# Patient Record
Sex: FEMALE | Race: WHITE | NOT HISPANIC OR LATINO | Employment: OTHER | URBAN - METROPOLITAN AREA
[De-identification: names, ages, dates, MRNs, and addresses within clinical notes are randomized per-mention and may not be internally consistent; named-entity substitution may affect disease eponyms.]

---

## 2017-02-01 ENCOUNTER — OFFICE VISIT (OUTPATIENT)
Dept: ORTHOPEDIC SURGERY | Facility: CLINIC | Age: 57
End: 2017-02-01

## 2017-02-01 VITALS — BODY MASS INDEX: 28.28 KG/M2 | WEIGHT: 176 LBS | HEIGHT: 66 IN

## 2017-02-01 DIAGNOSIS — S93.421A TEAR OF DELTOID LIGAMENT OF ANKLE, RIGHT, INITIAL ENCOUNTER: ICD-10-CM

## 2017-02-01 DIAGNOSIS — Z96.652 STATUS POST TOTAL KNEE REPLACEMENT, LEFT: Primary | ICD-10-CM

## 2017-02-01 PROCEDURE — 99213 OFFICE O/P EST LOW 20 MIN: CPT | Performed by: ORTHOPAEDIC SURGERY

## 2017-02-01 RX ORDER — BUSPIRONE HYDROCHLORIDE 30 MG/1
TABLET ORAL
Refills: 0 | Status: ON HOLD | COMMUNITY
Start: 2017-01-11 | End: 2017-11-18

## 2017-02-01 RX ORDER — GABAPENTIN 800 MG/1
TABLET ORAL
Refills: 3 | Status: ON HOLD | COMMUNITY
Start: 2017-01-25 | End: 2017-11-18

## 2017-02-01 RX ORDER — HYDROCODONE BITARTRATE AND ACETAMINOPHEN 5; 325 MG/1; MG/1
1 TABLET ORAL EVERY 8 HOURS PRN
Qty: 30 TABLET | Refills: 0 | Status: ON HOLD | OUTPATIENT
Start: 2017-02-01 | End: 2017-11-18
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
## 2017-02-05 PROBLEM — Z96.652 STATUS POST TOTAL KNEE REPLACEMENT, LEFT: Status: ACTIVE | Noted: 2017-02-05

## 2017-02-05 PROBLEM — S93.429A TEAR OF DELTOID LIGAMENT OF ANKLE: Status: ACTIVE | Noted: 2017-02-05

## 2017-02-26 ENCOUNTER — HOSPITAL ENCOUNTER (INPATIENT)
Dept: HOSPITAL 23 - CED | Age: 57
LOS: 3 days | Discharge: HOME | DRG: 133 | End: 2017-03-01
Admitting: INTERNAL MEDICINE
Payer: MEDICARE

## 2017-02-26 DIAGNOSIS — N39.41: ICD-10-CM

## 2017-02-26 DIAGNOSIS — K13.0: Primary | ICD-10-CM

## 2017-02-26 DIAGNOSIS — Z88.1: ICD-10-CM

## 2017-02-26 DIAGNOSIS — L02.01: ICD-10-CM

## 2017-02-26 DIAGNOSIS — G89.4: ICD-10-CM

## 2017-02-26 DIAGNOSIS — I10: ICD-10-CM

## 2017-02-26 DIAGNOSIS — M19.90: ICD-10-CM

## 2017-02-26 DIAGNOSIS — F17.210: ICD-10-CM

## 2017-02-26 DIAGNOSIS — F41.9: ICD-10-CM

## 2017-02-26 LAB
BASOPHIL#: 0.1 X10E3 (ref 0–0.3)
BASOPHIL%: 0.6 % (ref 0–2.5)
CK MB SERPL-RTO: 12.5 % (ref 11–15.5)
CK MB SERPL-RTO: 33.9 G/DL (ref 30–36)
DIFF IND: NO
EOSINOPHIL#: 0.3 X10E3 (ref 0–0.7)
EOSINOPHIL%: 2.3 % (ref 0–7)
HEMATOCRIT: 36.3 % (ref 35–45)
HEMOGLOBIN: 12.3 GM/DL (ref 12–16)
LYMPHOCYTE#: 2.4 X10E3 (ref 1–3.5)
LYMPHOCYTE%: 19.8 % (ref 17–45)
MEAN CELL VOLUME: 92.1 FL (ref 83–96)
MEAN CORPUSCULAR HEMOGLOBIN: 31.2 PG (ref 28–34)
MEAN PLATELET VOLUME: 8.7 FL (ref 6.5–11.5)
MONOCYTE#: 0.9 X10E3 (ref 0–1)
MONOCYTE%: 7.1 % (ref 3–12)
NEUTROPHIL#: 8.6 X10E3 (ref 1.5–7.1)
NEUTROPHIL%: 70.2 % (ref 40–75)
PLATELET COUNT: 380 X10E3 (ref 140–420)
RED BLOOD COUNT: 3.94 X10E (ref 3.9–5.3)
WHITE BLOOD COUNT: 12.3 X10E3 (ref 4–10.5)

## 2017-02-27 LAB
BARBITURATES: (no result)
BENZODIAZEPINES: (no result)
BLOOD UREA NITROGEN: 7 MG/DL (ref 9–23)
BUN/CREATININE RATIO: 14
CALCIUM SERUM: 8.8 MG/DL (ref 8.4–10.2)
COCAINE: (no result)
CREATININE SERUM: 0.5 MG/DL (ref 0.6–1.4)
DX ICD CODE: (no result)
DX ICD CODE: (no result)
GLOM FILT RATE ESTIMATED: (no result) ML/MIN (ref 60–?)
GLUCOSE FASTING: 92 MG/DL (ref 70–110)
KETONES UR QL: 100 MMOL/L (ref 100–111)
KETONES UR QL: 28 MMOL/L (ref 22–31)
OPIATES: (no result)
POTASSIUM: 3.5 MMOL/L (ref 3.5–5.1)
SODIUM: 136 MMOL/L (ref 135–145)
TRICYCLIC ANTIDEPRESSANTS: (no result)
U METHADONE: (no result)

## 2017-02-27 PROCEDURE — 0JB10ZZ EXCISION OF FACE SUBCUTANEOUS TISSUE AND FASCIA, OPEN APPROACH: ICD-10-PCS | Performed by: SURGERY

## 2017-02-28 LAB
BLOOD UREA NITROGEN: 6 MG/DL (ref 9–23)
BUN/CREATININE RATIO: 10
CALCIUM SERUM: 9.4 MG/DL (ref 8.4–10.2)
CK MB SERPL-RTO: 12.6 % (ref 11–15.5)
CK MB SERPL-RTO: 33.5 G/DL (ref 30–36)
CREATININE SERUM: 0.6 MG/DL (ref 0.6–1.4)
GLOM FILT RATE ESTIMATED: (no result) ML/MIN (ref 60–?)
GLUCOSE FASTING: 114 MG/DL (ref 70–110)
HEMATOCRIT: 38.1 % (ref 35–45)
HEMOGLOBIN: 12.8 GM/DL (ref 12–16)
KETONES UR QL: 31 MMOL/L (ref 22–31)
KETONES UR QL: 98 MMOL/L (ref 100–111)
MEAN CELL VOLUME: 92.8 FL (ref 83–96)
MEAN CORPUSCULAR HEMOGLOBIN: 31.1 PG (ref 28–34)
MEAN PLATELET VOLUME: 8.4 FL (ref 6.5–11.5)
PLATELET COUNT: 392 X10E3 (ref 140–420)
POTASSIUM: 3.4 MMOL/L (ref 3.5–5.1)
RED BLOOD COUNT: 4.11 X10E (ref 3.9–5.3)
SODIUM: 141 MMOL/L (ref 135–145)
WHITE BLOOD COUNT: 9.1 X10E3 (ref 4–10.5)

## 2017-03-01 LAB
BLOOD UREA NITROGEN: 9 MG/DL (ref 9–23)
BUN/CREATININE RATIO: 12.85
CALCIUM SERUM: 9.2 MG/DL (ref 8.4–10.2)
CREATININE SERUM: 0.7 MG/DL (ref 0.6–1.4)
GLOM FILT RATE ESTIMATED: (no result) ML/MIN (ref 60–?)
GLUCOSE FASTING: 95 MG/DL (ref 70–110)
KETONES UR QL: 34 MMOL/L (ref 22–31)
KETONES UR QL: 96 MMOL/L (ref 100–111)
MAGNESIUM: 1.9 MG/DL (ref 1.6–3)
POTASSIUM: 4.6 MMOL/L (ref 3.5–5.1)
SODIUM: 136 MMOL/L (ref 135–145)

## 2017-03-12 ENCOUNTER — HOSPITAL ENCOUNTER (EMERGENCY)
Dept: HOSPITAL 23 - CFTX | Age: 57
Discharge: HOME | End: 2017-03-12
Payer: MEDICARE
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
## 2017-03-12 DIAGNOSIS — Z79.899: ICD-10-CM
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
## 2017-03-12 DIAGNOSIS — S80.02XA: ICD-10-CM

## 2017-03-12 DIAGNOSIS — Z88.1: ICD-10-CM

## 2017-03-12 DIAGNOSIS — S93.401A: Primary | ICD-10-CM

## 2017-03-12 DIAGNOSIS — S80.01XA: ICD-10-CM

## 2017-03-12 DIAGNOSIS — F17.200: ICD-10-CM

## 2017-03-15 ENCOUNTER — TELEPHONE (OUTPATIENT)
Dept: ORTHOPEDIC SURGERY | Facility: CLINIC | Age: 57
End: 2017-03-15

## 2017-03-15 NOTE — TELEPHONE ENCOUNTER
Please make an appointment for her to see her PCP.  He needs to get x-rays.  If she needs an MRI they can order for her.  She can then see me if she has evidence of injury to the meniscus or the ACL or the MCL on the MRI in about 2 weeks.

## 2017-03-17 ENCOUNTER — HOSPITAL ENCOUNTER (EMERGENCY)
Dept: HOSPITAL 23 - CED | Age: 57
Discharge: HOME | End: 2017-03-17
Payer: MEDICARE

## 2017-03-17 DIAGNOSIS — F17.210: ICD-10-CM

## 2017-03-17 DIAGNOSIS — F41.1: ICD-10-CM

## 2017-03-17 DIAGNOSIS — Z88.1: ICD-10-CM

## 2017-03-17 DIAGNOSIS — R51: Primary | ICD-10-CM

## 2017-03-17 DIAGNOSIS — I10: ICD-10-CM

## 2017-03-17 DIAGNOSIS — J44.9: ICD-10-CM

## 2017-03-28 ENCOUNTER — TELEPHONE (OUTPATIENT)
Dept: ORTHOPEDIC SURGERY | Facility: CLINIC | Age: 57
End: 2017-03-28

## 2017-03-29 ENCOUNTER — TELEPHONE (OUTPATIENT)
Dept: ORTHOPEDIC SURGERY | Facility: CLINIC | Age: 57
End: 2017-03-29

## 2017-03-29 DIAGNOSIS — G89.29 CHRONIC PAIN OF BOTH KNEES: Primary | ICD-10-CM

## 2017-03-29 DIAGNOSIS — M25.561 CHRONIC PAIN OF BOTH KNEES: Primary | ICD-10-CM

## 2017-03-29 DIAGNOSIS — Z96.652 STATUS POST TOTAL KNEE REPLACEMENT, LEFT: ICD-10-CM

## 2017-03-29 DIAGNOSIS — M25.562 CHRONIC PAIN OF BOTH KNEES: Primary | ICD-10-CM

## 2017-03-29 DIAGNOSIS — M17.11 PRIMARY OSTEOARTHRITIS OF RIGHT KNEE: ICD-10-CM

## 2017-03-30 ENCOUNTER — TELEPHONE (OUTPATIENT)
Dept: ORTHOPEDIC SURGERY | Facility: CLINIC | Age: 57
End: 2017-03-30

## 2017-03-30 DIAGNOSIS — Z96.652 STATUS POST TOTAL KNEE REPLACEMENT, LEFT: ICD-10-CM

## 2017-03-30 DIAGNOSIS — S93.421A TEAR OF DELTOID LIGAMENT OF ANKLE, RIGHT, INITIAL ENCOUNTER: ICD-10-CM

## 2017-03-30 DIAGNOSIS — M25.561 CHRONIC PAIN OF BOTH KNEES: ICD-10-CM

## 2017-03-30 DIAGNOSIS — M25.562 CHRONIC PAIN OF BOTH KNEES: ICD-10-CM

## 2017-03-30 DIAGNOSIS — G89.29 CHRONIC PAIN OF BOTH KNEES: ICD-10-CM

## 2017-03-30 DIAGNOSIS — M17.11 PRIMARY OSTEOARTHRITIS OF RIGHT KNEE: ICD-10-CM

## 2017-03-30 DIAGNOSIS — G89.29 OTHER CHRONIC PAIN: Primary | ICD-10-CM

## 2017-03-30 NOTE — TELEPHONE ENCOUNTER
Please make a refferal to Pain management as requested and send in the office charts as requested  Sm

## 2017-04-06 ENCOUNTER — TELEPHONE (OUTPATIENT)
Dept: ORTHOPEDIC SURGERY | Facility: CLINIC | Age: 57
End: 2017-04-06

## 2017-04-07 ENCOUNTER — APPOINTMENT (OUTPATIENT)
Dept: PHYSICAL THERAPY | Facility: HOSPITAL | Age: 57
End: 2017-04-07
Attending: ORTHOPAEDIC SURGERY

## 2017-04-10 ENCOUNTER — HOSPITAL ENCOUNTER (EMERGENCY)
Dept: HOSPITAL 23 - CFTX | Age: 57
Discharge: HOME | End: 2017-04-10
Payer: MEDICARE

## 2017-04-10 ENCOUNTER — TELEPHONE (OUTPATIENT)
Dept: ORTHOPEDIC SURGERY | Facility: CLINIC | Age: 57
End: 2017-04-10

## 2017-04-10 DIAGNOSIS — F32.9: ICD-10-CM
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
## 2017-04-10 DIAGNOSIS — I10: ICD-10-CM

## 2017-04-10 DIAGNOSIS — I25.2: ICD-10-CM

## 2017-04-10 DIAGNOSIS — S89.92XA: Primary | ICD-10-CM

## 2017-04-10 DIAGNOSIS — F41.9: ICD-10-CM

## 2017-04-11 ENCOUNTER — TELEPHONE (OUTPATIENT)
Dept: ORTHOPEDIC SURGERY | Facility: CLINIC | Age: 57
End: 2017-04-11

## 2017-04-11 NOTE — TELEPHONE ENCOUNTER
SRIDHAR AGEE TALKED TO THIS PATIENT SEVERAL TIMES AND I'VE FAXED HER RECORDS TO THE PAIN CLINIC OF HER CHOICE. IM NOT SURE IF THEY WILL EVEN SEE HER BECAUSE SHE DOESN'T HAVE AN MRI FOR THE SPINE AND I L/M ADVISING HER OF THAT - message from Perri

## 2017-04-11 NOTE — TELEPHONE ENCOUNTER
Perri has already faxed her info to [facility]. Mya did you ask her which pain management location she is talking about?

## 2017-04-11 NOTE — TELEPHONE ENCOUNTER
Patient spoke to the pain clinic and they will see her next week. She is going to [address] [place] [address]. Their number is [phone].cld

## 2017-08-07 ENCOUNTER — HOSPITAL ENCOUNTER (INPATIENT)
Dept: HOSPITAL 23 - CED | Age: 57
LOS: 1 days | DRG: 918 | End: 2017-08-08
Attending: INTERNAL MEDICINE | Admitting: INTERNAL MEDICINE
Payer: MEDICARE

## 2017-08-07 VITALS — HEIGHT: 66 IN | BODY MASS INDEX: 26.01 KG/M2 | WEIGHT: 161.82 LBS

## 2017-08-07 DIAGNOSIS — K21.9: ICD-10-CM

## 2017-08-07 DIAGNOSIS — F41.9: ICD-10-CM

## 2017-08-07 DIAGNOSIS — N39.0: ICD-10-CM

## 2017-08-07 DIAGNOSIS — F11.20: ICD-10-CM

## 2017-08-07 DIAGNOSIS — F32.9: ICD-10-CM

## 2017-08-07 DIAGNOSIS — J44.9: ICD-10-CM

## 2017-08-07 DIAGNOSIS — T39.1X2A: Primary | ICD-10-CM

## 2017-08-07 DIAGNOSIS — T42.4X2A: ICD-10-CM

## 2017-08-07 DIAGNOSIS — T42.6X2A: ICD-10-CM

## 2017-08-07 DIAGNOSIS — Z88.1: ICD-10-CM

## 2017-08-07 DIAGNOSIS — F33.2: ICD-10-CM

## 2017-08-07 DIAGNOSIS — E78.5: ICD-10-CM

## 2017-08-07 LAB
ACETAMINOPHEN: <10 UG/ML
ALCOHOL BLOOD: <5 MG/DL
BARBITURATES UR QL SCN: 0.8 MG/DL (ref 0.2–2)
BARBITURATES UR QL SCN: 3.7 G/DL (ref 3.5–5)
BARBITURATES: (no result)
BASOPHIL#: 0 X10E3 (ref 0–0.3)
BASOPHIL%: 0.6 % (ref 0–2.5)
BENZODIAZ UR QL SCN: 33 U/L (ref 10–40)
BENZODIAZ UR QL SCN: 38 U/L (ref 10–42)
BENZODIAZEPINES: (no result)
BLOOD UREA NITROGEN: 12 MG/DL (ref 9–23)
BUN/CREATININE RATIO: 17.14
BZE UR QL SCN: 62 U/L (ref 32–92)
CALCIUM SERUM: 8.9 MG/DL (ref 8.4–10.2)
CK MB SERPL-RTO: 14 % (ref 11–15.5)
CK MB SERPL-RTO: 33.9 G/DL (ref 30–36)
COCAINE: (no result)
CREATININE SERUM: 0.7 MG/DL (ref 0.6–1.4)
DIFF IND: NO
DX ICD CODE: (no result)
DX ICD CODE: (no result)
EOSINOPHIL#: 0.1 X10E3 (ref 0–0.7)
EOSINOPHIL%: 1.1 % (ref 0–7)
ETHANOL BLD GC-MCNC: <4 MG/DL
GENTAMICIN PEAK SERPL-MCNC: YES MG/L
GLOM FILT RATE ESTIMATED: 96.9 ML/MIN (ref 60–?)
GLUCOSE FASTING: 103 MG/DL (ref 70–110)
HEMATOCRIT: 37.7 % (ref 35–45)
HEMOGLOBIN: 12.8 GM/DL (ref 12–16)
HIV1+2 AB SPEC QL IA.RAPID: 28.1 SECONDS (ref 23.5–31.3)
INR: 1
KETONES UR QL: 106 MMOL/L (ref 100–111)
KETONES UR QL: 27 MMOL/L (ref 22–31)
LYMPHOCYTE#: 1.1 X10E3 (ref 1–3.5)
LYMPHOCYTE%: 18.6 % (ref 17–45)
MEAN CELL VOLUME: 95 FL (ref 83–96)
MEAN CORPUSCULAR HEMOGLOBIN: 32.2 PG (ref 28–34)
MEAN PLATELET VOLUME: 8.6 FL (ref 6.5–11.5)
METHADONE UR QL SCN: 12 NG/ML (ref 0–7.9)
MONOCYTE#: 0.3 X10E3 (ref 0–1)
MONOCYTE%: 5.1 % (ref 3–12)
NEUTROPHIL#: 4.5 X10E3 (ref 1.5–7.1)
NEUTROPHIL%: 74.6 % (ref 40–75)
OPIATES: (no result)
PLATELET COUNT: 264 X10E3 (ref 140–420)
POC - TROPONIN: <0.05 NG/ML (ref ?–0.05)
POTASSIUM: 3.6 MMOL/L (ref 3.5–5.1)
PROTEIN TOTAL SERUM: 6.8 G/DL (ref 6–8.3)
PROTHROMBIN TIME (PATIENT): 10.6 SECONDS (ref 10–11.7)
RED BLOOD COUNT: 3.97 X10E (ref 3.9–5.3)
SODIUM: 141 MMOL/L (ref 135–145)
TRICYCLIC ANTIDEPRESSANTS: (no result)
U METHADONE: (no result)
URBCS1 AUWI: (no result) /[HPF] (ref 0–2)
URINE APPEARANCE: CLEAR
URINE BACTERIA AUWI: (no result)
URINE BILIRUBIN: (no result)
URINE BLOOD: (no result)
URINE COLOR: YELLOW
URINE GLUCOSE: (no result) MG/DL
URINE KETONE: (no result)
URINE LEUKOCYTE ESTERASE: (no result)
URINE NITRATE: (no result)
URINE PH: 6.5 (ref 5–8)
URINE PROTEIN: (no result)
URINE SOURCE: (no result)
URINE SPECIFIC GRAVITY: 1.01 (ref 1–1.03)
URINE SQUAMOUS EPITHELIAL CELL: (no result) /[HPF]
URINE UROBILINOGEN: 0.2 MG/DL
UWBCS1 AUWI: (no result) (ref 0–5)
WHITE BLOOD COUNT: 6 X10E3 (ref 4–10.5)

## 2017-08-07 PROCEDURE — G0480 DRUG TEST DEF 1-7 CLASSES: HCPCS

## 2017-08-08 ENCOUNTER — HOSPITAL ENCOUNTER (INPATIENT)
Dept: HOSPITAL 23 - P1E | Age: 57
LOS: 2 days | Discharge: HOME | DRG: 885 | End: 2017-08-10
Attending: PSYCHIATRY & NEUROLOGY | Admitting: PSYCHIATRY & NEUROLOGY
Payer: MEDICARE

## 2017-08-08 VITALS — WEIGHT: 160 LBS | HEIGHT: 66 IN | BODY MASS INDEX: 25.71 KG/M2

## 2017-08-08 DIAGNOSIS — E78.5: ICD-10-CM

## 2017-08-08 DIAGNOSIS — I10: ICD-10-CM

## 2017-08-08 DIAGNOSIS — F10.20: ICD-10-CM

## 2017-08-08 DIAGNOSIS — J44.9: ICD-10-CM

## 2017-08-08 DIAGNOSIS — M19.90: ICD-10-CM

## 2017-08-08 DIAGNOSIS — L02.01: ICD-10-CM

## 2017-08-08 DIAGNOSIS — G89.29: ICD-10-CM

## 2017-08-08 DIAGNOSIS — F33.2: Primary | ICD-10-CM

## 2017-08-08 DIAGNOSIS — N39.41: ICD-10-CM

## 2017-08-08 DIAGNOSIS — B95.62: ICD-10-CM

## 2017-08-08 LAB
%MB: 2.5 % (ref 0–4)
ARTERIAL BLOOD GAS ALLEN TEST: NORMAL
ARTERIAL BLOOD GAS ART SITE: (no result)
ARTERIAL BLOOD GAS DELIVERY: (no result)
ARTERIAL BLOOD GAS LITER FLOW: 2
BARBITURATES UR QL SCN: 0.7 MG/DL (ref 0.2–2)
BARBITURATES UR QL SCN: 3.5 G/DL (ref 3.5–5)
BASOPHIL#: 0.1 X10E3 (ref 0–0.3)
BASOPHIL%: 0.7 % (ref 0–2.5)
BENZODIAZ UR QL SCN: 33 U/L (ref 10–40)
BENZODIAZ UR QL SCN: 53 U/L (ref 10–42)
BLOOD UREA NITROGEN: 8 MG/DL (ref 9–23)
BUN/CREATININE RATIO: 11.42
BZE UR QL SCN: 59 U/L (ref 32–92)
CALCIUM SERUM: 9 MG/DL (ref 8.4–10.2)
CK MB SERPL-RTO: 14.5 % (ref 11–15.5)
CK MB SERPL-RTO: 32.9 G/DL (ref 30–36)
CK TOTAL: 1496 IU/L (ref 26–140)
CREATININE SERUM: 0.7 MG/DL (ref 0.6–1.4)
DIFF IND: NO
EOSINOPHIL#: 0.2 X10E3 (ref 0–0.7)
EOSINOPHIL%: 2.1 % (ref 0–7)
GENTAMICIN SERPL-MCNC: 0.8 %SAT (ref 0–2)
GENTAMICIN SERPL-MCNC: YES MG/L
GENTAMICIN TROUGH SERPL-MCNC: 1.1 %SAT (ref 0–9)
GENTAMICIN TROUGH SERPL-MCNC: 45 MMHG (ref 35–45)
GLOM FILT RATE ESTIMATED: 96.9 ML/MIN (ref 60–?)
GLUCOSE FASTING: 113 MG/DL (ref 70–110)
HCO3 BLDA-SCNC: 28.5 MMOL/L
HEMATOCRIT: 37.4 % (ref 35–45)
HEMOGLOBIN: 12.3 GM/DL (ref 12–16)
KETONES UR QL: 108 MMOL/L (ref 100–111)
KETONES UR QL: 27 MMOL/L (ref 22–31)
LYMPHOCYTE#: 1.7 X10E3 (ref 1–3.5)
LYMPHOCYTE%: 22.4 % (ref 17–45)
MB: 37 NG/ML
MEAN CELL VOLUME: 95.8 FL (ref 83–96)
MEAN CORPUSCULAR HEMOGLOBIN: 31.6 PG (ref 28–34)
MEAN PLATELET VOLUME: 8.4 FL (ref 6.5–11.5)
MONOCYTE#: 0.5 X10E3 (ref 0–1)
MONOCYTE%: 6.3 % (ref 3–12)
NEUTROPHIL#: 5.1 X10E3 (ref 1.5–7.1)
NEUTROPHIL%: 68.5 % (ref 40–75)
PLATELET COUNT: 252 X10E3 (ref 140–420)
PO2 BLDA: 115 MMHG (ref 80–100)
POTASSIUM: 4.2 MMOL/L (ref 3.5–5.1)
PROTEIN TOTAL SERUM: 6.5 G/DL (ref 6–8.3)
RED BLOOD COUNT: 3.91 X10E (ref 3.9–5.3)
SAO2 % BLDA: 97.1 % (ref 90–100)
SODIUM: 143 MMOL/L (ref 135–145)
WHITE BLOOD COUNT: 7.4 X10E3 (ref 4–10.5)

## 2017-08-09 LAB
BARBITURATES UR QL SCN: 0.3 MG/DL (ref 0.2–2)
BARBITURATES UR QL SCN: 3.1 G/DL (ref 3.5–5)
BARBITURATES: (no result)
BASOPHIL#: 0 X10E3 (ref 0–0.3)
BASOPHIL%: 0.9 % (ref 0–2.5)
BENZODIAZ UR QL SCN: 34 U/L (ref 10–40)
BENZODIAZ UR QL SCN: 55 U/L (ref 10–42)
BENZODIAZEPINES: (no result)
BLOOD UREA NITROGEN: 6 MG/DL (ref 9–23)
BUN/CREATININE RATIO: 8.57
BZE UR QL SCN: 54 U/L (ref 32–92)
CALCIUM SERUM: 9 MG/DL (ref 8.4–10.2)
CK MB SERPL-RTO: 14.1 % (ref 11–15.5)
CK MB SERPL-RTO: 33.3 G/DL (ref 30–36)
COCAINE: (no result)
CREATININE SERUM: 0.7 MG/DL (ref 0.6–1.4)
DIFF IND: NO
DX ICD CODE: (no result)
DX ICD CODE: (no result)
EOSINOPHIL#: 0.3 X10E3 (ref 0–0.7)
EOSINOPHIL%: 4.6 % (ref 0–7)
GLOM FILT RATE ESTIMATED: 96.9 ML/MIN (ref 60–?)
GLUCOSE FASTING: 70 MG/DL (ref 70–110)
HEMATOCRIT: 35.3 % (ref 35–45)
HEMOGLOBIN: 11.8 GM/DL (ref 12–16)
KETONES UR QL: 108 MMOL/L (ref 100–111)
KETONES UR QL: 27 MMOL/L (ref 22–31)
LYMPHOCYTE#: 2 X10E3 (ref 1–3.5)
LYMPHOCYTE%: 34.4 % (ref 17–45)
MEAN CELL VOLUME: 96.1 FL (ref 83–96)
MEAN CORPUSCULAR HEMOGLOBIN: 32 PG (ref 28–34)
MEAN PLATELET VOLUME: 9.3 FL (ref 6.5–11.5)
MONOCYTE#: 0.5 X10E3 (ref 0–1)
MONOCYTE%: 8.1 % (ref 3–12)
NEUTROPHIL#: 3 X10E3 (ref 1.5–7.1)
NEUTROPHIL%: 52 % (ref 40–75)
OPIATES: (no result)
PLATELET COUNT: 226 X10E3 (ref 140–420)
POTASSIUM: 3.8 MMOL/L (ref 3.5–5.1)
PROTEIN TOTAL SERUM: 5.8 G/DL (ref 6–8.3)
RED BLOOD COUNT: 3.67 X10E (ref 3.9–5.3)
SODIUM: 143 MMOL/L (ref 135–145)
TRICYCLIC ANTIDEPRESSANTS: (no result)
U METHADONE: (no result)
URINE APPEARANCE: CLEAR
URINE BILIRUBIN: (no result)
URINE BLOOD: (no result)
URINE COLOR: YELLOW
URINE GLUCOSE: (no result) MG/DL
URINE KETONE: (no result)
URINE LEUKOCYTE ESTERASE: (no result)
URINE NITRATE: (no result)
URINE PH: 7 (ref 5–8)
URINE PROTEIN: (no result)
URINE SOURCE: (no result)
URINE SPECIFIC GRAVITY: 1.01 (ref 1–1.03)
URINE UROBILINOGEN: 1 MG/DL
WHITE BLOOD COUNT: 5.7 X10E3 (ref 4–10.5)

## 2017-10-25 ENCOUNTER — HOSPITAL ENCOUNTER (EMERGENCY)
Facility: HOSPITAL | Age: 57
Discharge: HOME | End: 2017-10-25
Attending: EMERGENCY MEDICINE | Admitting: EMERGENCY MEDICINE

## 2017-10-25 ENCOUNTER — TELEPHONE (OUTPATIENT)
Dept: ORTHOPEDIC SURGERY | Facility: CLINIC | Age: 57
End: 2017-10-25

## 2017-10-25 ENCOUNTER — APPOINTMENT (OUTPATIENT)
Dept: GENERAL RADIOLOGY | Facility: HOSPITAL | Age: 57
End: 2017-10-25

## 2017-10-25 VITALS
OXYGEN SATURATION: 100 % | SYSTOLIC BLOOD PRESSURE: 110 MMHG | RESPIRATION RATE: 13 BRPM | BODY MASS INDEX: 24.11 KG/M2 | HEART RATE: 69 BPM | WEIGHT: 150 LBS | DIASTOLIC BLOOD PRESSURE: 88 MMHG | HEIGHT: 66 IN | TEMPERATURE: 96.5 F

## 2017-10-25 DIAGNOSIS — S86.911A KNEE STRAIN, RIGHT, INITIAL ENCOUNTER: Primary | ICD-10-CM

## 2017-10-25 PROCEDURE — 99283 EMERGENCY DEPT VISIT LOW MDM: CPT

## 2017-10-25 PROCEDURE — 73560 X-RAY EXAM OF KNEE 1 OR 2: CPT

## 2017-10-25 RX ORDER — HYDROCODONE BITARTRATE AND ACETAMINOPHEN 7.5; 325 MG/1; MG/1
1 TABLET ORAL ONCE
Status: COMPLETED | OUTPATIENT
Start: 2017-10-25 | End: 2017-10-25

## 2017-10-25 RX ADMIN — HYDROCODONE BITARTRATE AND ACETAMINOPHEN 1 TABLET: 7.5; 325 TABLET ORAL at 21:52
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
## 2017-10-26 ENCOUNTER — TELEPHONE (OUTPATIENT)
Dept: ORTHOPEDIC SURGERY | Facility: CLINIC | Age: 57
End: 2017-10-26

## 2017-10-26 NOTE — ED PROVIDER NOTES
"Discussed pt's case with Amaya AMBROSIO. Reviewed Analisa report.   PT presents to ER c/o R knee pain that is a chronic issue. Pt states she felt her knee \"come out of place\"; pt states she put her knee back in place and is still experiencing pain on arrival to ER.  On exam, pt was obstinent and argumentative; pt was insisting that an orthopedic doctor come see her in ER. Pt also demands we write her a script for pain medication.   In response pt has FROM of her R knee therefore this is not an acute orthopedic emergency. PT does not have any signs of an acute fracture. I shared her Analisa report with pt. The report demonstrates that pt is under current pain management and pt has a script for hydrocodone that should not be out until Novemeber 2nd. Therefore, I cannot refil pt's pain mdications at this time. Offered knee immobilizer which pt declined. PT is very unhappy with her care at this timel however, I feel there is nothing else to offer pt at this time. PT will be discharged home.     I supervised care provided by the midlevel provider.    We have discussed this patient's history, physical exam, and treatment plan.   I have reviewed the note and personally saw and examined the patient and agree with the plan of care.    Documentation assistance provided by sayra Toledo for Dr. Osborne.  Information recorded by the sayra was done at my direction and has been verified and validated by me.       Dylan Toledo  10/25/17 8444       Triston Osborne MD  10/26/17 5226    "

## 2017-10-26 NOTE — TELEPHONE ENCOUNTER
PATIENT CALLED BACK AND STATED THAT SHE IS IN SO MUCH PAIN AND FEELS LIKE HER KNEE WON'T MAKE IT UNTIL 11/1/17 WHEN SHE HAS AN APPOINTMENT WITH DR. CHEW.  I ADVISED PATIENT THAT DR. CHEW WON'T BE IN THE [place] OFFICE UNTIL THEN AND IF SHE HAS AN EMERGENCY BEFORE THAT TIME, SHE SHOULD GO TO THE ER.  PATIENT WAS ADAMANT THAT SHE SPEAK TO DR. CHEW

## 2017-10-26 NOTE — DISCHARGE INSTRUCTIONS
PLEASE READ AND REVIEW ALL DISCHARGE INSTRUCTIONS.     Please follow up with your primary care physician for any further evaluation/treatment and further management of your blood pressure.     Recheck in emergency department for any worsening and/or concerning symptoms.     Take all prescribed medicine as written and continue chronic medication.    Please follow up with Dr. Almaraz as scheduled.  If you need to move up your appointment, please call Dr. Almaraz tomorrow.

## 2017-10-26 NOTE — TELEPHONE ENCOUNTER
"Patient had made appt to see [facility] for her right knee on 11/1/17. Yesterday she went to [facility] ER. She says \"the office said he had 2 patients left and was going to call her\". She says the ER is \"treating her for the wrong thing, she knows she has a ligament tear\". Says she can't wait until 11/1, she can't walk. Please advise.  "

## 2017-10-26 NOTE — ED PROVIDER NOTES
"EMERGENCY DEPARTMENT ENCOUNTER    CHIEF COMPLAINT  Chief Complaint: knee pain  History given by:patient with spouse at bedside  History limited by:none  Room Number: 08/08  PMD: Suellen RIVERA Nadege      HPI:  Pt is a 57 y.o. female who presents with right knee pain after experiencing an injury 4 days ago. She describes that she had gotten up to use the restroom and upon standing, her knee \"gave out.\" Patient has taken Xanax and Neurontin in attempt to treat her anxiety and knee pain. States she ran out of her pain medicine today.  She has a h/o left total knee reconstruction by Dr. Almaraz.  She has an appointment with Dr. Almaraz in 1 week.     Duration: 4 days  Timing:constant  Location:right knee  Radiation:none stated  Quality:\"pain\"  Intensity/Severity:moderate  Progression:unchanged  Associated Symptoms:none stated  Aggravating Factors:none stated  Alleviating Factors:none stated  Previous Episodes:patient has a h/o left total knee reconstruction  Treatment before arrival:patient took xanax and neurontin prior to ED evaluation    MEDICAL RECORD REVIEW  Patient has a h/o previous knee injury and hypertension. She comes in today with a new knee injury and wanting evaluation.     PAST MEDICAL HISTORY  Active Ambulatory Problems     Diagnosis Date Noted   • Knee pain, bilateral 08/10/2016   • Primary osteoarthritis of right knee 08/10/2016   • Status post total knee replacement, left 02/05/2017   • Tear of deltoid ligament of ankle 02/05/2017     Resolved Ambulatory Problems     Diagnosis Date Noted   • No Resolved Ambulatory Problems     Past Medical History:   Diagnosis Date   • Anxiety    • Depression    • Hypertension        PAST SURGICAL HISTORY  Past Surgical History:   Procedure Laterality Date   • ANKLE SURGERY Left 2003   • HAND SURGERY Right    • KNEE SURGERY Left 2005       FAMILY HISTORY  Family History   Problem Relation Age of Onset   • Deep vein thrombosis Other    • Hypertension Other        SOCIAL " HISTORY  Social History     Social History   • Marital status: Single     Spouse name: N/A   • Number of children: N/A   • Years of education: N/A     Occupational History   • Not on file.     Social History Main Topics   • Smoking status: Current Every Day Smoker     Packs/day: 0.50     Types: Cigarettes   • Smokeless tobacco: Never Used   • Alcohol use No   • Drug use: No   • Sexual activity: Not on file     Other Topics Concern   • Not on file     Social History Narrative   • No narrative on file       ALLERGIES  Keflex [cephalexin] and Metoprolol    REVIEW OF SYSTEMS  Review of Systems   Constitutional: Negative for fever.   HENT: Negative for sore throat.    Eyes: Negative.    Respiratory: Negative for cough and shortness of breath.    Cardiovascular: Negative for chest pain.   Gastrointestinal: Negative for abdominal pain, diarrhea and vomiting.   Genitourinary: Negative for dysuria.   Musculoskeletal: Positive for arthralgias (right knee). Negative for neck pain.   Skin: Negative for rash.   Allergic/Immunologic: Negative.    Neurological: Negative for weakness, numbness and headaches.   All other systems reviewed and are negative.      PHYSICAL EXAM  ED Triage Vitals   Temp Heart Rate Resp BP SpO2   10/25/17 1822 10/25/17 1822 10/25/17 1822 10/25/17 1904 10/25/17 1822   96.5 °F (35.8 °C) 74 16 116/83 99 %      Temp src Heart Rate Source Patient Position BP Location FiO2 (%)   10/25/17 1822 -- -- -- --   Tympanic           Physical Exam   Constitutional: She is oriented to person, place, and time and well-developed, well-nourished, and in no distress. No distress.   Patient has slurred speech. Appears intoxicated.    HENT:   Head: Normocephalic and atraumatic.   Mouth/Throat: Oropharynx is clear and moist.   Eyes: EOM are normal.   Neck: Normal range of motion. Neck supple.   Pulmonary/Chest: Effort normal. No respiratory distress.   Musculoskeletal:   Right knee without redness, warmth, or effusion.   Positive medial joint line tenderness.  Normal calf exam without tenderness or swelling.  Normal distal NV exam.  Normal hip exam.    Neurological: She is alert and oriented to person, place, and time.   Skin: Skin is warm and dry. No pallor.   Psychiatric: Mood, memory, affect and judgment normal.   Nursing note and vitals reviewed.      LAB RESULTS  No results found for this or any previous visit (from the past 24 hour(s)).    I ordered the above labs and reviewed the results    RADIOLOGY  XR Knee 1 or 2 View Right   Final Result       No acute fracture is identified. As positioned, the patella appears   laterally subluxed or dislocation could be evidence of patellar   retinaculum injury, correlate clinically. Mild to moderate knee   effusion. If indicated, MRI could be considered for further evaluation.       This report was finalized on 10/25/2017 8:20 PM by Dr. Julián Lehman MD.              I ordered the above noted radiological studies and reviewed the images on the PACS system.        COURSE & MEDICAL DECISION MAKING  Pertinent Labs and Imaging studies that were ordered and reviewed are noted above.  Results were reviewed/discussed with the patient and they were also made aware of online assess.  Pt also made aware that some labs, such as cultures, will not be resulted during ER visit and follow up with PMD is necessary.       PROGRESS AND CONSULTS    Progress Notes:    ED Course     2014: XR R Knee to r/o fracture ordered in triage prior to evaluation.     2040: I discussed the normal xray findings with the patient.  She is established with Dr. Almaraz and has an appointment with him next week for her current knee pain.  I offered to give the patient a knee immobilizer for support.  Patient states she has one at home.  I have concerns about giving her pain medicaiton in the depratment that will be further sedating.  Patient admits to taking 2 ativan and 2 Neurontin prior to arrival.  Patient is  "requesting additional pain medicine.  States she ran out of her chornic pain medicine and cannot fill until the 3rd, in 8 days.  I explain that in the state of [place] we are unable to fill additional narcotic prescriptions while she has a current active script.  Patient verbalizes understanding.  Dr. Osborne informed.    2042: Reviewed pt's history and workup with Dr. Osborne.  After a bedside evaluation; Dr Osborne agrees with the plan of care.  Dr. Osborne also spoke with the patient at length regarding her pain medication prescription and requests.      2120: Dr. Osborne spoke with Dr. Hugo regarding the patient's care.  He suggested follow up with Dr. Almaraz.  Patient given Lortab 5mg in department and will be discharged for follow up with orthopedics.     2125: The patient's history, physical exam, and image findings were discussed with the physician, who also performed a face to face history and physical exam.  I discussed all results and noted any abnormalities with patient.  Discussed absoute need to recheck abnormalities with their family physician.  I answered any of the patient's questions.  Discussed plan for discharge, as there is no emergent indication for admission.  Pt is agreeable and understands need for follow up and repeat testing.  Pt is aware that discharge does not mean that nothing is wrong but it indicates no emergency is present and they must continue care with their family physician.  Pt is discharged with instructions to follow up with primary care doctor to have their blood pressure rechecked.       MEDICATIONS GIVEN IN ER  Medications   HYDROcodone-acetaminophen (NORCO) 7.5-325 MG per tablet 1 tablet (not administered)       /88 (BP Location: Right arm, Patient Position: Lying)  Pulse 69  Temp 96.5 °F (35.8 °C) (Tympanic)   Resp 13  Ht 66\" (167.6 cm)  Wt 150 lb (68 kg)  SpO2 100%  BMI 24.21 kg/m2      DIAGNOSIS  Final diagnoses:   Knee strain, right, initial encounter "       FOLLOW UP   Meño Almaraz MD  [address]  [address] [address]  [phone]            RX     Medication List      Notice     No changes were made to your prescriptions during this visit.        [employer] report #[id number] reviewed.  Risks, benefits, alternatives discussed with patient.  Pt consents to treatment and agrees to follow up with PMD tomorrow for further care and any other prescriptions.       Documentation assistance provided by sayra Justin for Viola Conde PA-C.  Information recorded by the scribe was done at my direction and has been verified and validated by me.  Electronically signed by Tanna Justin on 10/25/2017 at time 8:30 PM       Tanna Justin  10/25/17 2046       Viola Conde PA-C  10/25/17 2126

## 2017-11-01 ENCOUNTER — OFFICE VISIT (OUTPATIENT)
Dept: ORTHOPEDIC SURGERY | Facility: CLINIC | Age: 57
End: 2017-11-01

## 2017-11-01 ENCOUNTER — TELEPHONE (OUTPATIENT)
Dept: ORTHOPEDIC SURGERY | Facility: CLINIC | Age: 57
End: 2017-11-01

## 2017-11-01 DIAGNOSIS — M17.11 PRIMARY OSTEOARTHRITIS OF RIGHT KNEE: ICD-10-CM

## 2017-11-01 DIAGNOSIS — M25.562 CHRONIC PAIN OF BOTH KNEES: Primary | ICD-10-CM

## 2017-11-01 DIAGNOSIS — M25.561 CHRONIC PAIN OF BOTH KNEES: Primary | ICD-10-CM

## 2017-11-01 DIAGNOSIS — G89.29 CHRONIC PAIN OF BOTH KNEES: Primary | ICD-10-CM

## 2017-11-01 DIAGNOSIS — S83.104A DISLOCATION OF RIGHT KNEE, INITIAL ENCOUNTER: Primary | ICD-10-CM

## 2017-11-01 DIAGNOSIS — M25.561 ACUTE PAIN OF RIGHT KNEE: ICD-10-CM

## 2017-11-01 PROCEDURE — 99213 OFFICE O/P EST LOW 20 MIN: CPT | Performed by: ORTHOPAEDIC SURGERY

## 2017-11-01 RX ORDER — HYDROCODONE BITARTRATE AND ACETAMINOPHEN 5; 325 MG/1; MG/1
1 TABLET ORAL EVERY 12 HOURS PRN
Qty: 30 TABLET | Refills: 0 | Status: ON HOLD | OUTPATIENT
Start: 2017-11-01 | End: 2017-11-18

## 2017-11-01 NOTE — PROGRESS NOTES
Chief Complaint   Patient presents with   • Right Knee - Pain     CC: Right knee pain after patellar dislocation         HPI the patient is here today for right knee pain and dislocation. X-ray taken at [facility]  ER. Patient states that she has had a dislocation before.  This episode of trauma occurred on 25 October 2017.  She states that the ER physician did not help her either with a diagnosis or subsequent management of this condition. There was no particular event that she undergo that caused this dislocation to happen.  She states that she has swelling in the suprapatellar region.  She has a sense of tightness in the knee.  She has difficulty with flexing the knee beyond 90°.  She does have a positive apprehension sign.  The patient is already attached to a pain clinic and she has a very strong narcotic seeking behavior.  She is begging me for pain medication and says that her appointment with the pain clinic is sometime next week.  She states that she cannot go through this whole week without pain medication and states that she will get her long-term medication from that clinic but she does need a small amount of medication to get her through up to that point.  She has had a left total knee arthroplasty performed by  and then a revision knee arthroplasty performed by Dr. Kit Bee.          Allergies   Allergen Reactions   • Keflex [Cephalexin] Swelling   • Metoprolol Hives         Social History     Social History   • Marital status: Single     Spouse name: N/A   • Number of children: N/A   • Years of education: N/A     Occupational History   • Not on file.     Social History Main Topics   • Smoking status: Current Every Day Smoker     Packs/day: 0.50     Types: Cigarettes   • Smokeless tobacco: Never Used   • Alcohol use No   • Drug use: No   • Sexual activity: Not on file     Other Topics Concern   • Not on file     Social History Narrative   • No narrative on file       Family History   Problem  Relation Age of Onset   • Deep vein thrombosis Other    • Hypertension Other        Past Surgical History:   Procedure Laterality Date   • ANKLE SURGERY Left 2003   • HAND SURGERY Right    • KNEE SURGERY Left 2005       Past Medical History:   Diagnosis Date   • Anxiety    • Depression    • Hypertension            There were no vitals filed for this visit.          Review of Systems   Constitutional: Negative.    HENT: Negative.    Eyes: Negative.    Respiratory: Negative.    Cardiovascular: Negative.    Gastrointestinal: Negative.    Endocrine: Negative.    Genitourinary: Negative.    Musculoskeletal: Positive for back pain, gait problem and joint swelling.   Skin: Negative.    Allergic/Immunologic: Negative.    Hematological: Negative.            Physical Exam   Constitutional: She is oriented to person, place, and time. She appears well-nourished.   HENT:   Head: Atraumatic.   Eyes: EOM are normal.   Cardiovascular: Normal rate and intact distal pulses.    Pulmonary/Chest: Breath sounds normal.   Abdominal: Bowel sounds are normal.   Musculoskeletal: She exhibits edema and tenderness. She exhibits no deformity.   Neurological: She is alert and oriented to person, place, and time. She has normal reflexes.   Skin: Skin is dry.   Psychiatric: She has a normal mood and affect. Her behavior is normal. Judgment and thought content normal.   Nursing note and vitals reviewed.              Joint/Body Part Specific Exam:  right knee. The patients’ patellar grind test is exquisitely postive.  A lot of pain and discomfort in the retropatellar area. The patient has high Q-angle. Range of motion is from 0- 90 degrees of flexion. Anterior and posterior drawer signs are negative. No medial or lateral instability is noted. The patella tends to track laterally in high grades of flexion. A Slightly positive apprehension sign is noted. There is some tenderness over the medial patellofemoral ligaments. Skin and soft tissues are  swollen; consistent with inflammatory changes of the patellofemoral joint. Dorsalis pedis and posterior tibial artery pulses are palpable. Common peroneal nerve function is well preserved. Quad mechanism is well preserved.     left knee.The patient is status post total knee arthroplasty postoperative several  year(s). Incision is clean. Calf is soft and nontender. Homans sign is negative. There is no clicking, popping or catching. Anterior and posterior drawer signs are negative.  There is no instability of the components. Appropriate amounts of swelling and bruising are noted. Dorsalis pedis and posterior tibial artery pulses are palpable. Common peroneal nerve function is well preserved. Range of motion is from 0- 110° degrees of flexion. Gait is cautious but otherwise fairly normal. There is no evidence of a deep seated joint infection.      X-RAY Report:  Films from the Hospital are visualized and  reviewed . No fxs are noted but there is increased supra-patellar fluid accumulation           Diagnostics:            Seda was seen today for pain.    Diagnoses and all orders for this visit:    Dislocation of right knee, initial encounter  -     MRI Knee Right Without Contrast; Future  -     Standard Wheelchair    Acute pain of right knee  -     MRI Knee Right Without Contrast; Future  -     Standard Wheelchair    Other orders  -     HYDROcodone-acetaminophen (NORCO) 5-325 MG per tablet; Take 1 tablet by mouth Every 12 (Twelve) Hours As Needed for Moderate Pain .          Procedures          I provided this patient with educational materials regarding bone health and cast or splint care.        Plan:   Hinged knee brace to the right knee to prevent recurrence of dislocation of the patella.    Tablet ibuprofen 600 mg orally twice a day for pain swelling and discomfort.    Avoid the provocative position of the knee in flexion that allows the patella to re-dislocate.    Tablet Norco 5/325 mg tab 1 by mouth every 12  when necessary pain and discomfort total of 30 pills no refills.    Use an Ace wrap from the ankle to the thigh to help decrease the swelling.    Schedule an MRI of the right knee for evaluation of pathology involving the medial patellofemoral ligaments that might have been torn during this episode of dislocation or the possibility of an articular cartilage fragment that might have been chipped off during the dislocation process    Given the patient a prescription for a wheelchair because she is crying in the office stating that she cannot walk without the crutches.    Follow-up in my office in 4 weeks once the MRI has been obtained for further decision making.    Controlled substance treatment options discussed in detail. Patient's signed consent to medical options on file. MILES form in chart. Risks of narcotic medication usage outlined.  Possibility of physical and psychological dependence and abuse, especially long term, emphasized to the patient.  I have explained to the patient that we will try to wean them off the narcotic medication as soon as possible and introduce non-narcotic modalities of pain control.  I have also discussed the possibility of random drug testing as well as pill counts to prevent misuse and misappropriation of the narcotic medications prescribed to the patient.        CC To [place] Light

## 2017-11-02 PROBLEM — S83.104A DISLOCATION OF RIGHT KNEE: Status: ACTIVE | Noted: 2017-11-02

## 2017-11-02 PROBLEM — M25.561 ACUTE PAIN OF RIGHT KNEE: Status: ACTIVE | Noted: 2017-11-02

## 2017-11-06 ENCOUNTER — TELEPHONE (OUTPATIENT)
Dept: ORTHOPEDIC SURGERY | Facility: CLINIC | Age: 57
End: 2017-11-06

## 2017-11-09 ENCOUNTER — HOSPITAL ENCOUNTER (OUTPATIENT)
Dept: MRI IMAGING | Facility: HOSPITAL | Age: 57
Discharge: HOME | End: 2017-11-09
Attending: ORTHOPAEDIC SURGERY | Admitting: ORTHOPAEDIC SURGERY

## 2017-11-09 DIAGNOSIS — S83.104A DISLOCATION OF RIGHT KNEE, INITIAL ENCOUNTER: ICD-10-CM

## 2017-11-09 DIAGNOSIS — M25.561 ACUTE PAIN OF RIGHT KNEE: ICD-10-CM

## 2017-11-09 PROCEDURE — 73721 MRI JNT OF LWR EXTRE W/O DYE: CPT

## 2017-11-18 ENCOUNTER — APPOINTMENT (OUTPATIENT)
Dept: CT IMAGING | Facility: HOSPITAL | Age: 57
End: 2017-11-18

## 2017-11-18 ENCOUNTER — HOSPITAL ENCOUNTER (OUTPATIENT)
Facility: HOSPITAL | Age: 57
Setting detail: OBSERVATION
Discharge: NURSING, REHAB OR LONG-TERM CARE | End: 2017-11-21
Attending: EMERGENCY MEDICINE | Admitting: HOSPITALIST

## 2017-11-18 ENCOUNTER — APPOINTMENT (OUTPATIENT)
Dept: MRI IMAGING | Facility: HOSPITAL | Age: 57
End: 2017-11-18

## 2017-11-18 DIAGNOSIS — G89.29 CHRONIC PAIN OF RIGHT KNEE: ICD-10-CM

## 2017-11-18 DIAGNOSIS — M25.561 CHRONIC PAIN OF RIGHT KNEE: ICD-10-CM

## 2017-11-18 DIAGNOSIS — R93.0 ABNORMAL CT SCAN, HEAD: ICD-10-CM

## 2017-11-18 DIAGNOSIS — R56.9 SEIZURE-LIKE ACTIVITY (HCC): Primary | ICD-10-CM

## 2017-11-18 LAB
ALBUMIN SERPL-MCNC: 3.7 G/DL (ref 3.5–5.2)
ALBUMIN/GLOB SERPL: 1.2 G/DL
ALP SERPL-CCNC: 69 U/L (ref 39–117)
ALT SERPL W P-5'-P-CCNC: 52 U/L (ref 1–33)
AMPHET+METHAMPHET UR QL: NEGATIVE
ANION GAP SERPL CALCULATED.3IONS-SCNC: 16.7 MMOL/L
AST SERPL-CCNC: 41 U/L (ref 1–32)
BARBITURATES UR QL SCN: NEGATIVE
BASOPHILS # BLD AUTO: 0.03 10*3/MM3 (ref 0–0.2)
BASOPHILS NFR BLD AUTO: 0.2 % (ref 0–1.5)
BENZODIAZ UR QL SCN: NEGATIVE
BILIRUB SERPL-MCNC: 0.4 MG/DL (ref 0.1–1.2)
BUN BLD-MCNC: 10 MG/DL (ref 6–20)
BUN/CREAT SERPL: 16.1 (ref 7–25)
CALCIUM SPEC-SCNC: 9.1 MG/DL (ref 8.6–10.5)
CANNABINOIDS SERPL QL: NEGATIVE
CHLORIDE SERPL-SCNC: 96 MMOL/L (ref 98–107)
CO2 SERPL-SCNC: 22.3 MMOL/L (ref 22–29)
COCAINE UR QL: NEGATIVE
CREAT BLD-MCNC: 0.62 MG/DL (ref 0.57–1)
DEPRECATED RDW RBC AUTO: 43.5 FL (ref 37–54)
EOSINOPHIL # BLD AUTO: 0.01 10*3/MM3 (ref 0–0.7)
EOSINOPHIL NFR BLD AUTO: 0.1 % (ref 0.3–6.2)
ERYTHROCYTE [DISTWIDTH] IN BLOOD BY AUTOMATED COUNT: 12.4 % (ref 11.7–13)
ETHANOL BLD-MCNC: <10 MG/DL (ref 0–10)
ETHANOL UR QL: <0.01 %
GFR SERPL CREATININE-BSD FRML MDRD: 100 ML/MIN/1.73
GLOBULIN UR ELPH-MCNC: 3.1 GM/DL
GLUCOSE BLD-MCNC: 161 MG/DL (ref 65–99)
HCT VFR BLD AUTO: 35.6 % (ref 35.6–45.5)
HGB BLD-MCNC: 12 G/DL (ref 11.9–15.5)
IMM GRANULOCYTES # BLD: 0.03 10*3/MM3 (ref 0–0.03)
IMM GRANULOCYTES NFR BLD: 0.2 % (ref 0–0.5)
LYMPHOCYTES # BLD AUTO: 0.78 10*3/MM3 (ref 0.9–4.8)
LYMPHOCYTES NFR BLD AUTO: 5.6 % (ref 19.6–45.3)
MCH RBC QN AUTO: 32.5 PG (ref 26.9–32)
MCHC RBC AUTO-ENTMCNC: 33.7 G/DL (ref 32.4–36.3)
MCV RBC AUTO: 96.5 FL (ref 80.5–98.2)
METHADONE UR QL SCN: NEGATIVE
MONOCYTES # BLD AUTO: 0.52 10*3/MM3 (ref 0.2–1.2)
MONOCYTES NFR BLD AUTO: 3.7 % (ref 5–12)
NEUTROPHILS # BLD AUTO: 12.59 10*3/MM3 (ref 1.9–8.1)
NEUTROPHILS NFR BLD AUTO: 90.2 % (ref 42.7–76)
OPIATES UR QL: NEGATIVE
OXYCODONE UR QL SCN: POSITIVE
PLATELET # BLD AUTO: 353 10*3/MM3 (ref 140–500)
PMV BLD AUTO: 9.9 FL (ref 6–12)
POTASSIUM BLD-SCNC: 3.6 MMOL/L (ref 3.5–5.2)
PROT SERPL-MCNC: 6.8 G/DL (ref 6–8.5)
RBC # BLD AUTO: 3.69 10*6/MM3 (ref 3.9–5.2)
SODIUM BLD-SCNC: 135 MMOL/L (ref 136–145)
WBC NRBC COR # BLD: 13.96 10*3/MM3 (ref 4.5–10.7)

## 2017-11-18 PROCEDURE — 99284 EMERGENCY DEPT VISIT MOD MDM: CPT

## 2017-11-18 PROCEDURE — 80307 DRUG TEST PRSMV CHEM ANLYZR: CPT | Performed by: PSYCHIATRY & NEUROLOGY

## 2017-11-18 PROCEDURE — 25010000002 LEVETIRACETAM IN NACL 0.82% 500 MG/100ML SOLUTION: Performed by: HOSPITALIST

## 2017-11-18 PROCEDURE — 80307 DRUG TEST PRSMV CHEM ANLYZR: CPT | Performed by: EMERGENCY MEDICINE

## 2017-11-18 PROCEDURE — A9577 INJ MULTIHANCE: HCPCS | Performed by: HOSPITALIST

## 2017-11-18 PROCEDURE — G0378 HOSPITAL OBSERVATION PER HR: HCPCS

## 2017-11-18 PROCEDURE — 99214 OFFICE O/P EST MOD 30 MIN: CPT | Performed by: PSYCHIATRY & NEUROLOGY

## 2017-11-18 PROCEDURE — 94640 AIRWAY INHALATION TREATMENT: CPT

## 2017-11-18 PROCEDURE — 85025 COMPLETE CBC W/AUTO DIFF WBC: CPT | Performed by: EMERGENCY MEDICINE

## 2017-11-18 PROCEDURE — 70553 MRI BRAIN STEM W/O & W/DYE: CPT

## 2017-11-18 PROCEDURE — 70450 CT HEAD/BRAIN W/O DYE: CPT

## 2017-11-18 PROCEDURE — 0 GADOBENATE DIMEGLUMINE 529 MG/ML SOLUTION: Performed by: HOSPITALIST

## 2017-11-18 PROCEDURE — 80053 COMPREHEN METABOLIC PANEL: CPT | Performed by: EMERGENCY MEDICINE

## 2017-11-18 RX ORDER — GABAPENTIN 300 MG/1
300 CAPSULE ORAL EVERY 8 HOURS SCHEDULED
Status: DISCONTINUED | OUTPATIENT
Start: 2017-11-18 | End: 2017-11-21 | Stop reason: HOSPADM

## 2017-11-18 RX ORDER — HYDRALAZINE HYDROCHLORIDE 25 MG/1
25 TABLET, FILM COATED ORAL EVERY 8 HOURS SCHEDULED
Status: DISCONTINUED | OUTPATIENT
Start: 2017-11-18 | End: 2017-11-18

## 2017-11-18 RX ORDER — ALPRAZOLAM 0.5 MG/1
2 TABLET ORAL 3 TIMES DAILY PRN
Status: DISCONTINUED | OUTPATIENT
Start: 2017-11-18 | End: 2017-11-18

## 2017-11-18 RX ORDER — SODIUM CHLORIDE 0.9 % (FLUSH) 0.9 %
10 SYRINGE (ML) INJECTION AS NEEDED
Status: DISCONTINUED | OUTPATIENT
Start: 2017-11-18 | End: 2017-11-21 | Stop reason: HOSPADM

## 2017-11-18 RX ORDER — IPRATROPIUM BROMIDE AND ALBUTEROL SULFATE 2.5; .5 MG/3ML; MG/3ML
3 SOLUTION RESPIRATORY (INHALATION)
Status: DISCONTINUED | OUTPATIENT
Start: 2017-11-18 | End: 2017-11-21

## 2017-11-18 RX ORDER — ALPRAZOLAM 0.25 MG/1
0.25 TABLET ORAL 4 TIMES DAILY PRN
Status: DISCONTINUED | OUTPATIENT
Start: 2017-11-18 | End: 2017-11-21

## 2017-11-18 RX ORDER — PANTOPRAZOLE SODIUM 40 MG/1
40 TABLET, DELAYED RELEASE ORAL EVERY MORNING
Status: DISCONTINUED | OUTPATIENT
Start: 2017-11-18 | End: 2017-11-21 | Stop reason: HOSPADM

## 2017-11-18 RX ORDER — AMLODIPINE BESYLATE 10 MG/1
10 TABLET ORAL
Status: DISCONTINUED | OUTPATIENT
Start: 2017-11-18 | End: 2017-11-21 | Stop reason: HOSPADM

## 2017-11-18 RX ORDER — GABAPENTIN 800 MG/1
800 TABLET ORAL 3 TIMES DAILY
COMMUNITY
End: 2017-11-21 | Stop reason: HOSPADM

## 2017-11-18 RX ORDER — OXYCODONE HYDROCHLORIDE AND ACETAMINOPHEN 5; 325 MG/1; MG/1
1 TABLET ORAL ONCE
Status: DISCONTINUED | OUTPATIENT
Start: 2017-11-18 | End: 2017-11-18

## 2017-11-18 RX ORDER — ESTRADIOL 1 MG/1
0.5 TABLET ORAL DAILY
Status: DISCONTINUED | OUTPATIENT
Start: 2017-11-18 | End: 2017-11-21 | Stop reason: HOSPADM

## 2017-11-18 RX ORDER — BACLOFEN 10 MG/1
10 TABLET ORAL EVERY 8 HOURS SCHEDULED
Status: DISCONTINUED | OUTPATIENT
Start: 2017-11-18 | End: 2017-11-21 | Stop reason: HOSPADM

## 2017-11-18 RX ORDER — LEVETIRACETAM 5 MG/ML
500 INJECTION INTRAVASCULAR EVERY 12 HOURS SCHEDULED
Status: DISCONTINUED | OUTPATIENT
Start: 2017-11-18 | End: 2017-11-20

## 2017-11-18 RX ORDER — HYDROCODONE BITARTRATE AND ACETAMINOPHEN 5; 325 MG/1; MG/1
1 TABLET ORAL EVERY 4 HOURS PRN
Status: DISCONTINUED | OUTPATIENT
Start: 2017-11-18 | End: 2017-11-20

## 2017-11-18 RX ORDER — LOSARTAN POTASSIUM 100 MG/1
100 TABLET ORAL
Status: DISCONTINUED | OUTPATIENT
Start: 2017-11-18 | End: 2017-11-19

## 2017-11-18 RX ORDER — OXYCODONE HYDROCHLORIDE AND ACETAMINOPHEN 5; 325 MG/1; MG/1
1 TABLET ORAL ONCE
Status: COMPLETED | OUTPATIENT
Start: 2017-11-18 | End: 2017-11-18

## 2017-11-18 RX ORDER — TRAZODONE HYDROCHLORIDE 100 MG/1
100 TABLET ORAL NIGHTLY
Status: DISCONTINUED | OUTPATIENT
Start: 2017-11-18 | End: 2017-11-21 | Stop reason: HOSPADM

## 2017-11-18 RX ORDER — HYDROCODONE BITARTRATE AND ACETAMINOPHEN 5; 325 MG/1; MG/1
1 TABLET ORAL EVERY 8 HOURS PRN
Status: DISCONTINUED | OUTPATIENT
Start: 2017-11-18 | End: 2017-11-18

## 2017-11-18 RX ADMIN — OXYCODONE HYDROCHLORIDE AND ACETAMINOPHEN 1 TABLET: 5; 325 TABLET ORAL at 05:10

## 2017-11-18 RX ADMIN — HYDROCODONE BITARTRATE AND ACETAMINOPHEN 1 TABLET: 5; 325 TABLET ORAL at 08:33

## 2017-11-18 RX ADMIN — ALPRAZOLAM 0.25 MG: 0.25 TABLET ORAL at 15:03

## 2017-11-18 RX ADMIN — LEVETIRACETAM 500 MG: 500 INJECTION, SOLUTION INTRAVENOUS at 10:46

## 2017-11-18 RX ADMIN — TRAZODONE HYDROCHLORIDE 100 MG: 100 TABLET, FILM COATED ORAL at 21:25

## 2017-11-18 RX ADMIN — GABAPENTIN 300 MG: 300 CAPSULE ORAL at 21:25

## 2017-11-18 RX ADMIN — LEVETIRACETAM 500 MG: 500 INJECTION, SOLUTION INTRAVENOUS at 21:22

## 2017-11-18 RX ADMIN — GABAPENTIN 300 MG: 300 CAPSULE ORAL at 14:07

## 2017-11-18 RX ADMIN — HYDROCODONE BITARTRATE AND ACETAMINOPHEN 1 TABLET: 5; 325 TABLET ORAL at 14:07

## 2017-11-18 RX ADMIN — GADOBENATE DIMEGLUMINE 15 ML: 529 INJECTION, SOLUTION INTRAVENOUS at 16:32

## 2017-11-18 RX ADMIN — PANTOPRAZOLE SODIUM 40 MG: 40 TABLET, DELAYED RELEASE ORAL at 14:06

## 2017-11-18 RX ADMIN — ALPRAZOLAM 2 MG: 0.5 TABLET ORAL at 08:33

## 2017-11-18 RX ADMIN — LOSARTAN POTASSIUM 100 MG: 100 TABLET, FILM COATED ORAL at 15:03

## 2017-11-18 RX ADMIN — HYDROCODONE BITARTRATE AND ACETAMINOPHEN 1 TABLET: 5; 325 TABLET ORAL at 21:25

## 2017-11-18 RX ADMIN — ESTRADIOL 0.5 MG: 1 TABLET ORAL at 15:04

## 2017-11-18 RX ADMIN — BACLOFEN 10 MG: 10 TABLET ORAL at 21:25

## 2017-11-18 RX ADMIN — AMLODIPINE BESYLATE 10 MG: 10 TABLET ORAL at 14:06

## 2017-11-18 RX ADMIN — BACLOFEN 10 MG: 10 TABLET ORAL at 14:06
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
## 2017-11-18 NOTE — PLAN OF CARE
Problem: Patient Care Overview (Adult)  Goal: Plan of Care Review    11/18/17 1723   Coping/Psychosocial Response Interventions   Plan Of Care Reviewed With patient   Patient Care Overview   Progress no change   Outcome Evaluation   Outcome Summary/Follow up Plan vitals stable. c/o of pain thoughout the day to left leg/ankle. po xanax and norco given thoughout day. no seizure-like activity noted today. MRi of head performed today. patient still needs EEG.         Problem: Fall Risk (Adult)  Goal: Identify Related Risk Factors and Signs and Symptoms  Outcome: Ongoing (interventions implemented as appropriate)  Goal: Absence of Falls  Outcome: Ongoing (interventions implemented as appropriate)    Problem: Seizure Disorder/Epilepsy (Adult)  Goal: Signs and Symptoms of Listed Potential Problems Will be Absent or Manageable (Seizure Disorder/Epilepsy)  Outcome: Ongoing (interventions implemented as appropriate)    Problem: Skin Integrity Impairment, Risk/Actual (Adult)  Goal: Identify Related Risk Factors and Signs and Symptoms  Outcome: Ongoing (interventions implemented as appropriate)  Goal: Skin Integrity/Wound Healing  Outcome: Ongoing (interventions implemented as appropriate)
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
## 2017-11-18 NOTE — ED PROVIDER NOTES
EMERGENCY DEPARTMENT ENCOUNTER    CHIEF COMPLAINT  Chief Complaint: chronic R knee pain, possible seizure  History given by: pt  History limited by: nothing  Room Number: 503/1  PMD: Suellen MIGUEL Light      HPI:  Pt is a 57 y.o. female who presents complaining of chronic R knee pain which is ongoing for years. The pt was sent to the ED via EMS for a possible seizure which occurred earlier today. The pt has no recollection of having a seizure or how she sustained a facial injury. The pt states that the last thing she remembers is sitting in the bathroom. The pt also c/o chronic R thigh, and chronic R ankle pain that she states has recently worsened. The pt states that she in enrolled with pain management, but has been unable to go to her appointments. The pt has no complaints regarding tonight's incident.     Duration:  Chronic pain for several years, possible seizure earlier today  Onset: gradual  Timing: constant  Location: R knee   Radiation: none  Quality: chronic pain  Intensity/Severity: moderate  Progression: unchanged  Associated Symptoms: chronic R thigh, and chronic R ankle pain  Aggravating Factors: none  Alleviating Factors: none  Previous Episodes: none  Treatment before arrival: none    PAST MEDICAL HISTORY  Active Ambulatory Problems     Diagnosis Date Noted   • Knee pain, bilateral 08/10/2016   • Primary osteoarthritis of right knee 08/10/2016   • Status post total knee replacement, left 02/05/2017   • Tear of deltoid ligament of ankle 02/05/2017   • Dislocation of right knee 11/02/2017   • Acute pain of right knee 11/02/2017     Resolved Ambulatory Problems     Diagnosis Date Noted   • No Resolved Ambulatory Problems     Past Medical History:   Diagnosis Date   • Anxiety    • Depression    • Hypertension        PAST SURGICAL HISTORY  Past Surgical History:   Procedure Laterality Date   • ANKLE SURGERY Left 2003   • HAND SURGERY Right    • KNEE SURGERY Left 2005       FAMILY HISTORY  Family History    Problem Relation Age of Onset   • Deep vein thrombosis Other    • Hypertension Other        SOCIAL HISTORY  Social History     Social History   • Marital status: Single     Spouse name: N/A   • Number of children: N/A   • Years of education: N/A     Occupational History   • Not on file.     Social History Main Topics   • Smoking status: Current Every Day Smoker     Packs/day: 0.50     Types: Cigarettes   • Smokeless tobacco: Never Used   • Alcohol use No   • Drug use: No   • Sexual activity: Not on file     Other Topics Concern   • Not on file     Social History Narrative       ALLERGIES  Keflex [cephalexin] and Metoprolol    REVIEW OF SYSTEMS  Review of Systems   Constitutional: Negative for fever.   HENT: Negative for sore throat.    Eyes: Negative.    Respiratory: Negative for cough and shortness of breath.    Cardiovascular: Negative for chest pain.   Gastrointestinal: Negative for abdominal pain, diarrhea and vomiting.   Genitourinary: Negative for dysuria.   Musculoskeletal: Negative for neck pain.        R chronic knee pain, chronic R thigh, and chronic R ankle   Skin: Negative for rash.   Allergic/Immunologic: Negative.    Neurological: Negative for weakness, numbness and headaches.   Hematological: Negative.    Psychiatric/Behavioral: Negative.    All other systems reviewed and are negative.      PHYSICAL EXAM  ED Triage Vitals   Temp Heart Rate Resp BP SpO2   11/18/17 0417 11/18/17 0417 11/18/17 0417 11/18/17 0417 11/18/17 0417   97.6 °F (36.4 °C) 88 18 110/79 96 %      Temp src Heart Rate Source Patient Position BP Location FiO2 (%)   11/18/17 0417 11/18/17 0417 -- -- --   Tympanic Monitor          Physical Exam   Constitutional: She is oriented to person, place, and time and well-developed, well-nourished, and in no distress. No distress.   HENT:   Head: Normocephalic and atraumatic.   Right sided periorbital contusion on the right   Eyes: EOM are normal. Pupils are equal, round, and reactive to light.    Neck: Normal range of motion. Neck supple.   Cardiovascular: Normal rate, regular rhythm and normal heart sounds.    Pulmonary/Chest: Effort normal and breath sounds normal. No respiratory distress.   Abdominal: Soft. There is no tenderness. There is no rebound and no guarding.   Musculoskeletal: Normal range of motion. She exhibits no edema.   Neurological: She is alert and oriented to person, place, and time. She has normal sensation and normal strength.   Skin: Skin is warm and dry. No rash noted.   Psychiatric: Mood and affect normal.   Nursing note and vitals reviewed.      LAB RESULTS  Lab Results (last 24 hours)     Procedure Component Value Units Date/Time    CBC & Differential [[id number]] Collected:  11/18/17 0432    Specimen:  Blood Updated:  11/18/17 0452    Narrative:       The following orders were created for panel order CBC & Differential.  Procedure                               Abnormality         Status                     ---------                               -----------         ------                     CBC Auto Differential[[id number]]        Abnormal            Final result                 Please view results for these tests on the individual orders.    Comprehensive Metabolic Panel [[id number]]  (Abnormal) Collected:  11/18/17 0432    Specimen:  Blood Updated:  11/18/17 0501     Glucose 161 (H) mg/dL      BUN 10 mg/dL      Creatinine 0.62 mg/dL      Sodium 135 (L) mmol/L      Potassium 3.6 mmol/L      Chloride 96 (L) mmol/L      CO2 22.3 mmol/L      Calcium 9.1 mg/dL      Total Protein 6.8 g/dL      Albumin 3.70 g/dL      ALT (SGPT) 52 (H) U/L      AST (SGOT) 41 (H) U/L      Alkaline Phosphatase 69 U/L      Total Bilirubin 0.4 mg/dL      eGFR Non African Amer 100 mL/min/1.73      Globulin 3.1 gm/dL      A/G Ratio 1.2 g/dL      BUN/Creatinine Ratio 16.1     Anion Gap 16.7 mmol/L     CBC Auto Differential [[id number]]  (Abnormal) Collected:  11/18/17 0432    Specimen:  Blood Updated:  11/18/17  0452     WBC 13.96 (H) 10*3/mm3      RBC 3.69 (L) 10*6/mm3      Hemoglobin 12.0 g/dL      Hematocrit 35.6 %      MCV 96.5 fL      MCH 32.5 (H) pg      MCHC 33.7 g/dL      RDW 12.4 %      RDW-SD 43.5 fl      MPV 9.9 fL      Platelets 353 10*3/mm3      Neutrophil % 90.2 (H) %      Lymphocyte % 5.6 (L) %      Monocyte % 3.7 (L) %      Eosinophil % 0.1 (L) %      Basophil % 0.2 %      Immature Grans % 0.2 %      Neutrophils, Absolute 12.59 (H) 10*3/mm3      Lymphocytes, Absolute 0.78 (L) 10*3/mm3      Monocytes, Absolute 0.52 10*3/mm3      Eosinophils, Absolute 0.01 10*3/mm3      Basophils, Absolute 0.03 10*3/mm3      Immature Grans, Absolute 0.03 10*3/mm3     Ethanol [[id number]] Collected:  11/18/17 0432    Specimen:  Blood Updated:  11/18/17 0534     Ethanol <10 mg/dL      Ethanol % <0.010 %           I ordered the above labs and reviewed the results    RADIOLOGY  CT Head Without Contrast   Preliminary Result   1.  In the left medial frontal lobe close to the vertex there is a 0.4   cm focus of increased density, small hemorrhage cannot be excluded.   2.  Follow-up CT scan is recommended.           Findings called to Dr. Melvin, 5:10 AM.               I ordered the above noted radiological studies. Interpreted by radiologist. Discussed with radiologist (Dr. Pierce). Reviewed by me in PACS.       PROCEDURES  Procedures      PROGRESS AND CONSULTS  ED Course     0422: Ordered labs for further evaluation.     0438: Ordered percocet for pain. Ordered CT head and labs for further evaluation.     0514: Placed call to [facility].    0523: Discussed pt's case with Dr. Madsen ([facility]) who agrees to admit pt to a telemetry bed.     0540: Rechecked pt, she was resting comfortably. Discussed imaging results which showed a small density in the pt's left medial frontal lobe. Discussed plan to admit pt for further evaluation. The pt agrees with and understands plan to admit. All questions were addressed at this time.     MEDICAL DECISION  MAKING  Results were reviewed/discussed with the patient and they were also made aware of online access. Pt also made aware that some labs, such as cultures, will not be resulted during ER visit and follow up with PMD is necessary.     MDM  Number of Diagnoses or Management Options     Amount and/or Complexity of Data Reviewed  Clinical lab tests: ordered and reviewed (WBC: 13.96, sodium: 135)  Tests in the radiology section of CPT®: ordered and reviewed (CT head: left medial frontal lone close to the vertex there is a 0.4 cm focus of density, could be hemorrhage. )  Discussion of test results with the performing providers: yes (Dr. Pierce (radiology))  Decide to obtain previous medical records or to obtain history from someone other than the patient: yes  Review and summarize past medical records: yes (All previous ER visits secondary to chronic knee problems)  Discuss the patient with other providers: yes (Dr. Madsen ([facility]))    Patient Progress  Patient progress: stable         DIAGNOSIS  Final diagnoses:   Seizure-like activity   Chronic pain of right knee   Abnormal CT scan, head       DISPOSITION  ADMISSION    Discussed treatment plan and reason for admission with pt/family and admitting physician.  Pt/family voiced understanding of the plan for admission for further testing/treatment as needed.         Latest Documented Vital Signs:  As of 6:56 AM  BP- 129/80 HR- 85 Temp- 98.4 °F (36.9 °C) (Oral) O2 sat- 96%    --  Documentation assistance provided by sayra Carl for .  Information recorded by the scribe was done at my direction and has been verified and validated by me.     Yuliana Carl  11/18/17 0552       Gavino Melvin MD  11/18/17 8285

## 2017-11-18 NOTE — H&P
History and physical    Primary care physician  Dr. Light    Chief complaint  Possible seizure-like activity    History of present illness  57-year-old white female with history of COPD hypertension anxiety depression and osteoarthritis low back pain chronic pain syndrome presents to [facility] room with seizure-like activity did not witness.  Patient in ER complaining of right knee pain back pain.  Patient denies any headache chest pain increased shortness of breath abdominal pain nausea vomiting diarrhea.  Patient does not remember anything.  No loss of control of bowel and bladder no tongue biting and no history of previous seizure disorder.    PAST MEDICAL HISTORY    • Anxiety     • Depression     • Hypertension           PAST SURGICAL HISTORY   Surgical History          Past Surgical History:   Procedure Laterality Date   • ANKLE SURGERY Left 2003   • HAND SURGERY Right     • KNEE SURGERY Left 2005            FAMILY HISTORY        Family History   Problem Relation Age of Onset   • Deep vein thrombosis Other     • Hypertension Other           SOCIAL HISTORY   Social History    Social History            Social History   • Marital status: Single       Spouse name: N/A   • Number of children: N/A   • Years of education: N/A          Occupational History   • Not on file.            Social History Main Topics   • Smoking status: Current Every Day Smoker       Packs/day: 0.50       Types: Cigarettes   • Smokeless tobacco: Never Used   • Alcohol use No   • Drug use: No   • Sexual activity: Not on file           Other Topics Concern   • Not on file      Social History Narrative            ALLERGIES  Keflex [cephalexin] and Metoprolol    Home medications reviewed     REVIEW OF SYSTEMS  Review of Systems   Constitutional: Negative for fever.   HENT: Negative for sore throat.    Eyes: Negative.    Respiratory: Negative for cough and shortness of breath.    Cardiovascular: Negative for chest pain.  "  Gastrointestinal: Negative for abdominal pain, diarrhea and vomiting.   Genitourinary: Negative for dysuria.   Musculoskeletal: Negative for neck pain.        R chronic knee pain, chronic R thigh, and chronic R ankle   Skin: Negative for rash.   Allergic/Immunologic: Negative.    Neurological: Negative for weakness, numbness and headaches.   Hematological: Negative.    Psychiatric/Behavioral: Negative.    All other systems reviewed and are negative.        PHYSICAL EXAM  Blood pressure 129/80, pulse 85, temperature 98.4 °F (36.9 °C), temperature source Oral, resp. rate 18, height 66\" (167.6 cm), weight 168 lb 11.2 oz (76.5 kg), SpO2 96 %.    Constitutional: She is oriented to person, place, and time and well-developed, well-nourished, and in no distress. No distress.   Head: Normocephalic and atraumatic.   Eyes: EOM are normal. Pupils are equal, round, and reactive to light.   Neck: Normal range of motion. Neck supple.   Cardiovascular: Normal rate, regular rhythm and normal heart sounds.    Pulmonary/Chest: Effort normal and breath sounds normal. No respiratory distress.   Abdominal: Soft. There is no tenderness. There is no rebound and no guarding.   Musculoskeletal: Normal range of motion. She exhibits no edema.   Neurological: She is alert and oriented to person, place, and time. She has normal sensation and normal strength.   Skin: Skin is warm and dry. No rash noted.   Psychiatric: Mood and affect normal.     LAB RESULTS  Lab Results (last 24 hours)     Procedure Component Value Units Date/Time    CBC & Differential [[id number]] Collected:  11/18/17 0432    Specimen:  Blood Updated:  11/18/17 0452    Narrative:       The following orders were created for panel order CBC & Differential.  Procedure                               Abnormality         Status                     ---------                               -----------         ------                     CBC Auto Differential[[id number]]        Abnormal     "        Final result                 Please view results for these tests on the individual orders.    CBC Auto Differential [[id number]]  (Abnormal) Collected:  11/18/17 0432    Specimen:  Blood Updated:  11/18/17 0452     WBC 13.96 (H) 10*3/mm3      RBC 3.69 (L) 10*6/mm3      Hemoglobin 12.0 g/dL      Hematocrit 35.6 %      MCV 96.5 fL      MCH 32.5 (H) pg      MCHC 33.7 g/dL      RDW 12.4 %      RDW-SD 43.5 fl      MPV 9.9 fL      Platelets 353 10*3/mm3      Neutrophil % 90.2 (H) %      Lymphocyte % 5.6 (L) %      Monocyte % 3.7 (L) %      Eosinophil % 0.1 (L) %      Basophil % 0.2 %      Immature Grans % 0.2 %      Neutrophils, Absolute 12.59 (H) 10*3/mm3      Lymphocytes, Absolute 0.78 (L) 10*3/mm3      Monocytes, Absolute 0.52 10*3/mm3      Eosinophils, Absolute 0.01 10*3/mm3      Basophils, Absolute 0.03 10*3/mm3      Immature Grans, Absolute 0.03 10*3/mm3     Comprehensive Metabolic Panel [[id number]]  (Abnormal) Collected:  11/18/17 0432    Specimen:  Blood Updated:  11/18/17 0501     Glucose 161 (H) mg/dL      BUN 10 mg/dL      Creatinine 0.62 mg/dL      Sodium 135 (L) mmol/L      Potassium 3.6 mmol/L      Chloride 96 (L) mmol/L      CO2 22.3 mmol/L      Calcium 9.1 mg/dL      Total Protein 6.8 g/dL      Albumin 3.70 g/dL      ALT (SGPT) 52 (H) U/L      AST (SGOT) 41 (H) U/L      Alkaline Phosphatase 69 U/L      Total Bilirubin 0.4 mg/dL      eGFR Non African Amer 100 mL/min/1.73      Globulin 3.1 gm/dL      A/G Ratio 1.2 g/dL      BUN/Creatinine Ratio 16.1     Anion Gap 16.7 mmol/L     Ethanol [[id number]] Collected:  11/18/17 0432    Specimen:  Blood Updated:  11/18/17 0534     Ethanol <10 mg/dL      Ethanol % <0.010 %         Imaging Results (last 24 hours)     Procedure Component Value Units Date/Time    CT Head Without Contrast [[id number]] Collected:  11/18/17 0458     Updated:  11/18/17 0513    Narrative:       CT SCAN OF THE BRAIN WITHOUT CONTRAST.     TECHNIQUE: Radiation dose reduction techniques were  utilized, including  automated exposure control and exposure modulation based on body size.  Multiple axial images of the brain were obtained from the vertex to the  base of the brain.     HISTORY:  Trauma, seizure.     COMPARISON:  No prior studies for comparison.     FINDINGS:   Midline structures are within normal limits, there is no hydrocephalus.  Gray-white matter differentiation is maintained. In the high left  frontal lobe medially there is a 0.4 cm focus of increased density which  may be further evaluated. Small amount of hemorrhage cannot be excluded.     Orbits are within normal limits. No significant mucosal disease of the  para-nasal sinuses. Mastoid air cells are well aerated.              Impression:       1.  In the left medial frontal lobe close to the vertex there is a 0.4  cm focus of increased density, small hemorrhage cannot be excluded.  2.  Follow-up CT scan is recommended.         Findings called to Dr. Melvin, 5:10 AM.             Current Facility-Administered Medications:   •  ALPRAZolam (XANAX) tablet 0.25 mg, 0.25 mg, Oral, 4x Daily PRN, Yoav Madsen MD  •  amLODIPine (NORVASC) tablet 10 mg, 10 mg, Oral, Q24H, Yoav Madsen MD  •  baclofen (LIORESAL) tablet 10 mg, 10 mg, Oral, Q8H, Yoav Madsen MD  •  estradiol (ESTRACE) tablet 0.5 mg, 0.5 mg, Oral, Daily, Yoav Madsen MD  •  gabapentin (NEURONTIN) capsule 300 mg, 300 mg, Oral, Q8H, Yoav Madsen MD  •  HYDROcodone-acetaminophen (NORCO) 5-325 MG per tablet 1 tablet, 1 tablet, Oral, Q4H PRN, Yoav Madsen MD  •  ipratropium-albuterol (DUO-NEB) nebulizer solution 3 mL, 3 mL, Nebulization, Q4H - RT, Yoav Madsen MD  •  levETIRAcetam in NaCl 0.82% (KEPPRA) IVPB 500 mg, 500 mg, Intravenous, Q12H, Yoav Madsen MD, 500 mg at 11/18/17 1046  •  losartan (COZAAR) tablet 100 mg, 100 mg, Oral, Q24H, Yoav Madsen MD  •  pantoprazole (PROTONIX) EC tablet 40 mg, 40 mg, Oral, QAM, Yoav Ahmed, MD  •  Insert peripheral IV, , , Once **AND** sodium chloride  0.9 % flush 10 mL, 10 mL, Intravenous, PRN, Gavino Melvin MD  •  Insert peripheral IV, , , Once **AND** sodium chloride 0.9 % flush 10 mL, 10 mL, Intravenous, PRN, Gavino Melvin MD  •  traZODone (DESYREL) tablet 100 mg, 100 mg, Oral, Nightly, Danika Madsen MD     ASSESSMENT  New onset seizure disorder  Density left medial frontal lobe brain/small hemorrhage  COPD  Hypertension  Osteoarthritis  Tobacco abuse  Low back pain  Anxiety disorder  Depression  Chronic pain syndrome    PLAN  Admit  Keppra IV twice a day  Check MRI of the brain and EEG  Neuro consult  Continue home medications  Neuro check and seizure precautions  Supportive care  Stress ulcer DVT prophylaxis  Follow closely further recommendation chronic hospital course    DANIKA MADSEN MD

## 2017-11-18 NOTE — ED NOTES
Ems reports they were called for sz. Pt reports she fell in her bathroom and has contusion to her right eye. Pt  reports pf has had shaking spells since midnight     Fer Hollis RN  11/18/17 1148

## 2017-11-18 NOTE — ED PROVIDER NOTES
Subjective   History of Present Illness    Review of Systems    Past Medical History:   Diagnosis Date   • Anxiety    • Depression    • Hypertension        Allergies   Allergen Reactions   • Keflex [Cephalexin] Swelling   • Metoprolol Hives       Past Surgical History:   Procedure Laterality Date   • ANKLE SURGERY Left 2003   • HAND SURGERY Right    • KNEE SURGERY Left 2005       Family History   Problem Relation Age of Onset   • Deep vein thrombosis Other    • Hypertension Other        Social History     Social History   • Marital status: Single     Spouse name: N/A   • Number of children: N/A   • Years of education: N/A     Social History Main Topics   • Smoking status: Current Every Day Smoker     Packs/day: 0.50     Types: Cigarettes   • Smokeless tobacco: Never Used   • Alcohol use No   • Drug use: No   • Sexual activity: Not Asked     Other Topics Concern   • None     Social History Narrative           Objective   Physical Exam    Procedures         ED Course  ED Course                  MDM    Final diagnoses:   Seizure-like activity   Chronic pain of right knee   Abnormal CT scan, head

## 2017-11-19 ENCOUNTER — APPOINTMENT (OUTPATIENT)
Dept: GENERAL RADIOLOGY | Facility: HOSPITAL | Age: 57
End: 2017-11-19

## 2017-11-19 LAB
ALBUMIN SERPL-MCNC: 3.3 G/DL (ref 3.5–5.2)
ALBUMIN/GLOB SERPL: 1.1 G/DL
ALP SERPL-CCNC: 65 U/L (ref 39–117)
ALT SERPL W P-5'-P-CCNC: 42 U/L (ref 1–33)
ANION GAP SERPL CALCULATED.3IONS-SCNC: 12.4 MMOL/L
AST SERPL-CCNC: 31 U/L (ref 1–32)
BASOPHILS # BLD AUTO: 0.05 10*3/MM3 (ref 0–0.2)
BASOPHILS NFR BLD AUTO: 0.6 % (ref 0–1.5)
BILIRUB SERPL-MCNC: 0.6 MG/DL (ref 0.1–1.2)
BUN BLD-MCNC: 9 MG/DL (ref 6–20)
BUN/CREAT SERPL: 17.3 (ref 7–25)
CALCIUM SPEC-SCNC: 8.7 MG/DL (ref 8.6–10.5)
CHLORIDE SERPL-SCNC: 102 MMOL/L (ref 98–107)
CHOLEST SERPL-MCNC: 232 MG/DL (ref 0–200)
CO2 SERPL-SCNC: 25.6 MMOL/L (ref 22–29)
CREAT BLD-MCNC: 0.52 MG/DL (ref 0.57–1)
DEPRECATED RDW RBC AUTO: 44.3 FL (ref 37–54)
EOSINOPHIL # BLD AUTO: 0.21 10*3/MM3 (ref 0–0.7)
EOSINOPHIL NFR BLD AUTO: 2.6 % (ref 0.3–6.2)
ERYTHROCYTE [DISTWIDTH] IN BLOOD BY AUTOMATED COUNT: 12.5 % (ref 11.7–13)
GFR SERPL CREATININE-BSD FRML MDRD: 122 ML/MIN/1.73
GLOBULIN UR ELPH-MCNC: 3.1 GM/DL
GLUCOSE BLD-MCNC: 95 MG/DL (ref 65–99)
HBA1C MFR BLD: 5.1 % (ref 4.8–5.6)
HCT VFR BLD AUTO: 36 % (ref 35.6–45.5)
HDLC SERPL-MCNC: 138 MG/DL (ref 40–60)
HGB BLD-MCNC: 11.9 G/DL (ref 11.9–15.5)
IMM GRANULOCYTES # BLD: 0 10*3/MM3 (ref 0–0.03)
IMM GRANULOCYTES NFR BLD: 0 % (ref 0–0.5)
LDLC SERPL CALC-MCNC: 84 MG/DL (ref 0–100)
LDLC/HDLC SERPL: 0.61 {RATIO}
LYMPHOCYTES # BLD AUTO: 2.63 10*3/MM3 (ref 0.9–4.8)
LYMPHOCYTES NFR BLD AUTO: 33.1 % (ref 19.6–45.3)
MCH RBC QN AUTO: 32.2 PG (ref 26.9–32)
MCHC RBC AUTO-ENTMCNC: 33.1 G/DL (ref 32.4–36.3)
MCV RBC AUTO: 97.6 FL (ref 80.5–98.2)
MONOCYTES # BLD AUTO: 0.74 10*3/MM3 (ref 0.2–1.2)
MONOCYTES NFR BLD AUTO: 9.3 % (ref 5–12)
NEUTROPHILS # BLD AUTO: 4.31 10*3/MM3 (ref 1.9–8.1)
NEUTROPHILS NFR BLD AUTO: 54.4 % (ref 42.7–76)
NT-PROBNP SERPL-MCNC: 161.8 PG/ML (ref 5–900)
PLATELET # BLD AUTO: 325 10*3/MM3 (ref 140–500)
PMV BLD AUTO: 9.9 FL (ref 6–12)
POTASSIUM BLD-SCNC: 3.4 MMOL/L (ref 3.5–5.2)
PROT SERPL-MCNC: 6.4 G/DL (ref 6–8.5)
RBC # BLD AUTO: 3.69 10*6/MM3 (ref 3.9–5.2)
SODIUM BLD-SCNC: 140 MMOL/L (ref 136–145)
TRIGL SERPL-MCNC: 49 MG/DL (ref 0–150)
TSH SERPL DL<=0.05 MIU/L-ACNC: 1.44 MIU/ML (ref 0.27–4.2)
VLDLC SERPL-MCNC: 9.8 MG/DL (ref 5–40)
WBC NRBC COR # BLD: 7.94 10*3/MM3 (ref 4.5–10.7)

## 2017-11-19 PROCEDURE — 83880 ASSAY OF NATRIURETIC PEPTIDE: CPT | Performed by: HOSPITALIST

## 2017-11-19 PROCEDURE — G8979 MOBILITY GOAL STATUS: HCPCS

## 2017-11-19 PROCEDURE — 85025 COMPLETE CBC W/AUTO DIFF WBC: CPT | Performed by: HOSPITALIST

## 2017-11-19 PROCEDURE — 80053 COMPREHEN METABOLIC PANEL: CPT | Performed by: HOSPITALIST

## 2017-11-19 PROCEDURE — G8978 MOBILITY CURRENT STATUS: HCPCS

## 2017-11-19 PROCEDURE — 99212 OFFICE O/P EST SF 10 MIN: CPT | Performed by: PSYCHIATRY & NEUROLOGY

## 2017-11-19 PROCEDURE — 84443 ASSAY THYROID STIM HORMONE: CPT | Performed by: HOSPITALIST

## 2017-11-19 PROCEDURE — 97162 PT EVAL MOD COMPLEX 30 MIN: CPT

## 2017-11-19 PROCEDURE — G0378 HOSPITAL OBSERVATION PER HR: HCPCS

## 2017-11-19 PROCEDURE — 83036 HEMOGLOBIN GLYCOSYLATED A1C: CPT | Performed by: HOSPITALIST

## 2017-11-19 PROCEDURE — 80061 LIPID PANEL: CPT | Performed by: HOSPITALIST

## 2017-11-19 PROCEDURE — G8980 MOBILITY D/C STATUS: HCPCS

## 2017-11-19 PROCEDURE — 73560 X-RAY EXAM OF KNEE 1 OR 2: CPT

## 2017-11-19 PROCEDURE — 25010000002 LEVETIRACETAM IN NACL 0.82% 500 MG/100ML SOLUTION: Performed by: HOSPITALIST

## 2017-11-19 RX ORDER — LOSARTAN POTASSIUM 25 MG/1
25 TABLET ORAL
Status: DISCONTINUED | OUTPATIENT
Start: 2017-11-20 | End: 2017-11-20

## 2017-11-19 RX ADMIN — ALPRAZOLAM 0.25 MG: 0.25 TABLET ORAL at 16:58

## 2017-11-19 RX ADMIN — ALPRAZOLAM 0.25 MG: 0.25 TABLET ORAL at 10:12

## 2017-11-19 RX ADMIN — BACLOFEN 10 MG: 10 TABLET ORAL at 21:39

## 2017-11-19 RX ADMIN — TRAZODONE HYDROCHLORIDE 100 MG: 100 TABLET, FILM COATED ORAL at 21:39

## 2017-11-19 RX ADMIN — BACLOFEN 10 MG: 10 TABLET ORAL at 06:15

## 2017-11-19 RX ADMIN — GABAPENTIN 300 MG: 300 CAPSULE ORAL at 21:39

## 2017-11-19 RX ADMIN — GABAPENTIN 300 MG: 300 CAPSULE ORAL at 15:40

## 2017-11-19 RX ADMIN — ESTRADIOL 0.5 MG: 1 TABLET ORAL at 09:32

## 2017-11-19 RX ADMIN — BACLOFEN 10 MG: 10 TABLET ORAL at 15:40

## 2017-11-19 RX ADMIN — HYDROCODONE BITARTRATE AND ACETAMINOPHEN 1 TABLET: 5; 325 TABLET ORAL at 15:40

## 2017-11-19 RX ADMIN — HYDROCODONE BITARTRATE AND ACETAMINOPHEN 1 TABLET: 5; 325 TABLET ORAL at 20:13

## 2017-11-19 RX ADMIN — LEVETIRACETAM 500 MG: 500 INJECTION, SOLUTION INTRAVENOUS at 09:30

## 2017-11-19 RX ADMIN — AMLODIPINE BESYLATE 10 MG: 10 TABLET ORAL at 09:32

## 2017-11-19 RX ADMIN — HYDROCODONE BITARTRATE AND ACETAMINOPHEN 1 TABLET: 5; 325 TABLET ORAL at 11:21

## 2017-11-19 RX ADMIN — GABAPENTIN 300 MG: 300 CAPSULE ORAL at 06:15

## 2017-11-19 RX ADMIN — LEVETIRACETAM 500 MG: 500 INJECTION, SOLUTION INTRAVENOUS at 21:44

## 2017-11-19 RX ADMIN — HYDROCODONE BITARTRATE AND ACETAMINOPHEN 1 TABLET: 5; 325 TABLET ORAL at 06:15

## 2017-11-19 RX ADMIN — LOSARTAN POTASSIUM 100 MG: 100 TABLET, FILM COATED ORAL at 09:32

## 2017-11-19 RX ADMIN — PANTOPRAZOLE SODIUM 40 MG: 40 TABLET, DELAYED RELEASE ORAL at 06:15

## 2017-11-19 NOTE — PROGRESS NOTES
"Progress Note      PATIENT Seda Urbina    1960   MRN [id number]   ADMIT DATE 2017   LENGTH OF STAY 0 days   ATTENDING Yoav Madsen MD       CHIEF COMPLAINT: Fall and Seizures      DIAGNOSIS: Abnormal CT scan, head [R93.0]  Seizure-like activity [R56.9]  Chronic pain of right knee [M25.561, G89.29]    HISTORY OF PRESENT ILLNESS: He still complains of right knee pain.  There've been no loss of consciousness episodes.  Her Xanax is been reduced from October 0.0 mg tablet to a 0.25.  Despite that she seems sleepy and not particularly anxious.  She is now taking frequent Norco every 4 hours though she tells me she was taking 3 a day as an outpatient.  It's noteworthy that her urine drug screen was positive for oxycodone but she did receive 1 oxycodone the ER so I don't think that's of any help.    She's been unable to get a hold boyfriend but left a message.  Therefore I have no more information to support the idea of the seizure.    PHYSICAL EXAMINATION:  GENERAL: Reveals a man/woman who appears his/her stated age, in no acute distress.  VITAL SIGNS: /58 (BP Location: Left arm, Patient Position: Lying)  Pulse 99  Temp 98.1 °F (36.7 °C) (Oral)   Resp 16  Ht 66\" (167.6 cm)  Wt 174 lb 8 oz (79.2 kg)  SpO2 94%  BMI 28.17 kg/m2  NECK: Carotids are equal without bruits.   HEART: Regular rate and rhythm with no significant murmur or gallop.   EXTREMITIES: Nonedematous. Pulses are intact. There is no rash. There is no hepatosplenomegaly.   NEUROLOGIC: He is awake and alert. He is oriented x3. There is no aphasia. Visual fields are full. Cranial nerves are intact. Power is normal in all 4 extremities. Toes are downgoing.      DIAGNOSTIC DATA: none    IMPRESSION AND PLAN: As discussed in yesterday's note I have no information historically that she had a seizure.  He is scheduled for an EEG tomorrow.  Could be arranged as an outpatient.  However she staying in the hospital after we get some greater " history from the boyfriend and the results EEG we can decide whether to continue her anticonvulsants.  At present I lean away from it.        Cooper Hsieh MD  11/19/2017  1:33 PM

## 2017-11-19 NOTE — THERAPY EVALUATION
Acute Care - Physical Therapy Initial Evaluation  [facility]     Patient Name: Seda Urbina  : 1960  MRN: [id number]  Today's Date: 2017   Onset of Illness/Injury or Date of Surgery Date: 17  Date of Referral to PT: 17  Referring Physician: Dr. Madsen      Admit Date: 2017     Visit Dx:    ICD-10-CM ICD-9-CM   1. Seizure-like activity R56.9 780.39   2. Chronic pain of right knee M25.561 719.46    G89.29 338.29   3. Abnormal CT scan, head R93.0 793.0     Patient Active Problem List   Diagnosis   • Knee pain, bilateral   • Primary osteoarthritis of right knee   • Status post total knee replacement, left   • Tear of deltoid ligament of ankle   • Dislocation of right knee   • Acute pain of right knee   • Seizure-like activity     Past Medical History:   Diagnosis Date   • Anxiety    • Depression    • Hypertension      Past Surgical History:   Procedure Laterality Date   • ANKLE SURGERY Left    • HAND SURGERY Right    • KNEE SURGERY Left           PT ASSESSMENT (last 72 hours)      PT Evaluation       17 1119 17 0658    Rehab Evaluation    Document Type evaluation  -MA     Subjective Information agree to therapy   patient reports R knee dislocation-refused amb due to this  -MA     Patient Effort, Rehab Treatment good  -MA     Symptoms Noted During/After Treatment increased pain   Denies any seizure-like sx during eval  -MA     General Information    Patient Profile Review yes  -MA     Onset of Illness/Injury or Date of Surgery Date 17  -MA     Referring Physician Dr. Madsen  -MA     General Observations supine in bed with HOB elevated, no acute distress noted at rest  -MA     Pertinent History Of Current Problem Patient admitted for possibe seizure but patient unaware and does not remember. Per patient, patient stood up from commode at home and dislocated her R knee. No report of fall. Patient states she hasnt been able to walk since.   -MA      Precautions/Limitations no known precautions/limitations  -MA     Prior Level of Function independent:;all household mobility;using stairs  -MA     Equipment Currently Used at Home  cane, straight  -HT    Plans/Goals Discussed With patient  -MA     Living Environment    Lives With  significant other  -HT    Living Arrangements  mobile home  -HT    Home Accessibility  no concerns  -HT    Stair Railings at Home  none  -HT    Type of Financial/Environmental Concern  none  -HT    Transportation Available  car;family or friend will provide  -HT    Clinical Impression    Date of Referral to PT 11/19/17  -MA     Criteria for Skilled Therapeutic Interventions Met no;no problems identified which require skilled intervention  -MA     Pain Assessment    Pain Assessment 0-10  -MA     Pain Score 7  -MA     Pain Location Knee  -MA     Pain Orientation Right  -MA     Pain Intervention(s) Repositioned;Ambulation/increased activity;Rest  -MA     Response to Interventions Notified RN regarding pain level and patient wanting her pain medicine. Voiced to patient that she is not eligible for next pain med. Patient voiced wanting pain medicine multiple times despite information given.   -MA     Vision Assessment/Intervention    Visual Impairment WFL  -MA     Cognitive Assessment/Intervention    Current Cognitive/Communication Assessment functional  -MA     Orientation Status oriented x 4  -MA     Follows Commands/Answers Questions 100% of the time;able to follow multi-step instructions  -MA     Personal Safety WNL/WFL;good awareness, safety precautions  -MA     Personal Safety Interventions fall prevention program maintained;gait belt;nonskid shoes/slippers when out of bed  -MA     ROM (Range of Motion)    General ROM Detail L LE WFL, patient would not extend R LE due to pain  -MA     MMT (Manual Muscle Testing)    General MMT Assessment no strength deficits identified  -MA     Bed Mobility, Assessment/Treatment    Bed Mobility,  Assistive Device head of bed elevated  -MA     Bed Mobility, Scoot/Bridge, Dubois independent  -MA     Bed Mob, Supine to Sit, Dub[place] independent  -MA     Bed Mob, Sit to Supine, Dubois not tested  -MA     Transfer Assessment/Treatment    Transfers, Bed-Chair Dubois set up required;supervision required   bed<>wheelchair  -MA     Toilet Transfer, Dubois set up required;supervision required  -MA     Transfer, Comment No report of pain during transfer.  -MA     Gait Assessment/Treatment    Gait, Comment Patient refused all ambulation this date due to stating her R knee is dislocated.  -MA     Therapy Exercises    Left Lower Extremity AROM:;10 reps;sitting;LAQ  -MA     Positioning and Restraints    Pre-Treatment Position in bed  -MA     Post Treatment Position chair  -MA     In Chair notified nsg;sitting;call light within reach;encouraged to call for assist  -MA       User Key  (r) = Recorded By, (t) = Taken By, (c) = Cosigned By    Initials Name Provider Type     Sandy Aranda RN Registered Nurse    GLADYS Gandhi PT Physical Therapist          Physical Therapy Education     Title: PT OT SLP Therapies (Resolved)     Topic: Physical Therapy (Resolved)     Point: Mobility training (Resolved)    Learning Progress Summary    Learner Readiness Method Response Comment Documented by Status   Patient Acceptance E VU  MA 11/19/17 1133 Done                      User Key     Initials Effective Dates Name Provider Type [facility] [place] 12/13/16 -  Lisa Gandhi PT Physical Therapist PT                PT Recommendation and Plan  Anticipated Discharge Disposition: home with assist  PT Frequency: evaluation only  Plan of Care Review  Plan Of Care Reviewed With: patient  Outcome Summary/Follow up Plan: Patient is a 57 y.o. female admitted for seizure-like symptoms. Patient reported she stood from her commode at home and dislocated her R knee. Refused any ambulation date- awaiting for  Ortho consult. Patient performed all bed mobility independently and only required set up for transfers with SV due to necessity to use wheelchair. Based on clinical presentation, patient does not require skilled PT services acutely at this time due to level of independence and refusal to walk. DANIEL Alvarez notified and in agreement with plan of care.              Outcome Measures       11/19/17 1100          How much help from another person do you currently need...    Turning from your back to your side while in flat bed without using bedrails? 4  -MA      Moving from lying on back to sitting on the side of a flat bed without bedrails? 4  -MA      Moving to and from a bed to a chair (including a wheelchair)? 4  -MA      Standing up from a chair using your arms (e.g., wheelchair, bedside chair)? 4  -MA      Climbing 3-5 steps with a railing? 1  -MA      To walk in hospital room? 1  -MA      AM-PAC 6 Clicks Score 18  -MA      Functional Assessment    Outcome Measure Options AM-PAC 6 Clicks Basic Mobility (PT)  -MA        User Key  (r) = Recorded By, (t) = Taken By, (c) = Cosigned By    Initials Name Provider Type    GLADYS Gandhi PT Physical Therapist           Time Calculation:         PT Charges       11/19/17 1134          Time Calculation    Start Time 1110  -MA      Stop Time 1134  -MA      Time Calculation (min) 24 min  -MA      PT Received On 11/19/17  -MA        User Key  (r) = Recorded By, (t) = Taken By, (c) = Cosigned By    Initials Name Provider Type    GLADYS Gandhi PT Physical Therapist          Therapy Charges for Today     Code Description Service Date Service Provider Modifiers Qty    [id number]  PT EVAL MOD COMPLEXITY 2 11/19/2017 Lisa Gandhi, PT GP 1          PT G-Codes  Outcome Measure Options: AM-PAC 6 Clicks Basic Mobility (PT)      Lisa Gandhi PT  11/19/2017

## 2017-11-19 NOTE — PLAN OF CARE
Problem: Patient Care Overview (Adult)  Goal: Plan of Care Review    11/19/17 1129   Coping/Psychosocial Response Interventions   Plan Of Care Reviewed With patient   Outcome Evaluation   Outcome Summary/Follow up Plan Patient is a 57 y.o. female admitted for seizure-like symptoms. Patient reported she stood from her commode at home and dislocated her R knee. Refused any ambulation date- awaiting for Ortho consult. Patient performed all bed mobility independently and only required set up for transfers with SV due to necessity to use wheelchair. Based on clinical presentation, patient does not require skilled PT services acutely at this time due to level of independence and refusal to walk. DANIEL Alvarez notified and in agreement with plan of care.

## 2017-11-19 NOTE — PROGRESS NOTES
"Daily progress note    Chief complaint  Complain of right knee pain  Otherwise feeling better    History of present illness  57-year-old white female with history of COPD hypertension anxiety depression and osteoarthritis low back pain chronic pain syndrome presents to [facility], [place], operated by [facility] emergency room with seizure-like activity did not witness.  Patient in ER complaining of right knee pain back pain.  Patient denies any headache chest pain increased shortness of breath abdominal pain nausea vomiting diarrhea.  Patient does not remember anything.  No loss of control of bowel and bladder no tongue biting and no history of previous seizure disorder.     REVIEW OF SYSTEMS  Review of Systems   Constitutional: Negative for fever.   HENT: Negative for sore throat.    Eyes: Negative.    Respiratory: Negative for cough and shortness of breath.    Cardiovascular: Negative for chest pain.   Gastrointestinal: Negative for abdominal pain, diarrhea and vomiting.   Genitourinary: Negative for dysuria.   Musculoskeletal: Negative for neck pain.        R chronic knee pain, chronic R thigh, and chronic R ankle   Skin: Negative for rash.   Allergic/Immunologic: Negative.    Neurological: Negative for weakness, numbness and headaches.   Hematological: Negative.    Psychiatric/Behavioral: Negative.    All other systems reviewed and are negative.     PHYSICAL EXAM  Blood pressure 105/58, pulse 99, temperature 98.1 °F (36.7 °C), temperature source Oral, resp. rate 16, height 66\" (167.6 cm), weight 174 lb 8 oz (79.2 kg), SpO2 94 %.    Constitutional: She is oriented to person, place, and time and well-developed, well-nourished, and in no distress. No distress.   Head: Normocephalic and atraumatic.   Eyes: EOM are normal. Pupils are equal, round, and reactive to light.   Neck: Normal range of motion. Neck supple.   Cardiovascular: Normal rate, regular rhythm and normal heart sounds.    Pulmonary/Chest: Effort normal and breath sounds normal. " No respiratory distress.   Abdominal: Soft. There is no tenderness. There is no rebound and no guarding.   Musculoskeletal: Normal range of motion. She exhibits no edema.   Neurological: She is alert and oriented to person, place, and time. She has normal sensation and normal strength.   Skin: Skin is warm and dry. No rash noted.   Psychiatric: Mood and affect normal.     LAB RESULTS  Lab Results (last 24 hours)     Procedure Component Value Units Date/Time    Urine Drug Screen - Urine, Clean Catch [[id number]]  (Abnormal) Collected:  11/18/17 1713    Specimen:  Urine from Urine, Clean Catch Updated:  11/18/17 1742     Amphet/Methamphet, Screen Negative     Barbiturates Screen, Urine Negative     Benzodiazepine Screen, Urine Negative     Cocaine Screen, Urine Negative     Opiate Screen Negative     THC, Screen, Urine Negative     Methadone Screen, Urine Negative     Oxycodone Screen, Urine Positive (A)    Narrative:       Negative Thresholds For Drugs Screened:     Amphetamines               500 ng/ml   Barbiturates               200 ng/ml   Benzodiazepines            100 ng/ml   Cocaine                    300 ng/ml   Methadone                  300 ng/ml   Opiates                    300 ng/ml   Oxycodone                  100 ng/ml   THC                        50 ng/ml    The Normal Value for all drugs tested is negative. This report includes final unconfirmed screening results to be used for medical treatment purposes only. Unconfirmed results must not be used for non-medical purposes such as employment or legal testing. Clinical consideration should be applied to any drug of abuse test, particulary when unconfirmed results are used.    Hemoglobin A1c [[id number]]  (Normal) Collected:  11/19/17 0541    Specimen:  Blood Updated:  11/19/17 0604     Hemoglobin A1C 5.10 %     Narrative:       Hemoglobin A1C Ranges:    Increased Risk for Diabetes  5.7% to 6.4%  Diabetes                     >= 6.5%  Diabetic Goal                 < 7.0%    CBC & Differential [[id number]] Collected:  11/19/17 0541    Specimen:  Blood Updated:  11/19/17 0611    Narrative:       The following orders were created for panel order CBC & Differential.  Procedure                               Abnormality         Status                     ---------                               -----------         ------                     CBC Auto Differential[[id number]]        Abnormal            Final result                 Please view results for these tests on the individual orders.    CBC Auto Differential [[id number]]  (Abnormal) Collected:  11/19/17 0541    Specimen:  Blood Updated:  11/19/17 0611     WBC 7.94 10*3/mm3      RBC 3.69 (L) 10*6/mm3      Hemoglobin 11.9 g/dL      Hematocrit 36.0 %      MCV 97.6 fL      MCH 32.2 (H) pg      MCHC 33.1 g/dL      RDW 12.5 %      RDW-SD 44.3 fl      MPV 9.9 fL      Platelets 325 10*3/mm3      Neutrophil % 54.4 %      Lymphocyte % 33.1 %      Monocyte % 9.3 %      Eosinophil % 2.6 %      Basophil % 0.6 %      Immature Grans % 0.0 %      Neutrophils, Absolute 4.31 10*3/mm3      Lymphocytes, Absolute 2.63 10*3/mm3      Monocytes, Absolute 0.74 10*3/mm3      Eosinophils, Absolute 0.21 10*3/mm3      Basophils, Absolute 0.05 10*3/mm3      Immature Grans, Absolute 0.00 10*3/mm3     Comprehensive Metabolic Panel [[id number]]  (Abnormal) Collected:  11/19/17 0541    Specimen:  Blood Updated:  11/19/17 0618     Glucose 95 mg/dL      BUN 9 mg/dL      Creatinine 0.52 (L) mg/dL      Sodium 140 mmol/L      Potassium 3.4 (L) mmol/L      Chloride 102 mmol/L      CO2 25.6 mmol/L      Calcium 8.7 mg/dL      Total Protein 6.4 g/dL      Albumin 3.30 (L) g/dL      ALT (SGPT) 42 (H) U/L      AST (SGOT) 31 U/L      Alkaline Phosphatase 65 U/L      Total Bilirubin 0.6 mg/dL      eGFR Non African Amer 122 mL/min/1.73      Globulin 3.1 gm/dL      A/G Ratio 1.1 g/dL      BUN/Creatinine Ratio 17.3     Anion Gap 12.4 mmol/L     BNP [[id number]]  (Normal)  Collected:  11/19/17 0541    Specimen:  Blood Updated:  11/19/17 0630     proBNP 161.8 pg/mL     Narrative:       Among patients with dyspnea, NT-proBNP is highly sensitive for the detection of acute congestive heart failure. In addition NT-proBNP of <300 pg/ml effectively rules out acute congestive heart failure with 99% negative predictive value.    TSH [[id number]]  (Normal) Collected:  11/19/17 0541    Specimen:  Blood Updated:  11/19/17 0630     TSH 1.440 mIU/mL     Lipid Panel [[id number]]  (Abnormal) Collected:  11/19/17 0541    Specimen:  Blood Updated:  11/19/17 0630     Total Cholesterol 232 (H) mg/dL      Triglycerides 49 mg/dL      HDL Cholesterol 138 (H) mg/dL      LDL Cholesterol  84 mg/dL      VLDL Cholesterol 9.8 mg/dL      LDL/HDL Ratio 0.61    Narrative:       Cholesterol Reference Ranges  (U.S. Department of Health and Human Services ATP III Classifications)    Desirable          <200 mg/dL  Borderline High    200-239 mg/dL  High Risk          >240 mg/dL      Triglyceride Reference Ranges  (U.S. Department of Health and Human Services ATP III Classifications)    Normal           <150 mg/dL  Borderline High  150-199 mg/dL  High             200-499 mg/dL  Very High        >500 mg/dL    HDL Reference Ranges  (U.S. Department of Health and Human Services ATP III Classifcations)    Low     <40 mg/dl (major risk factor for CHD)  High    >60 mg/dl ('negative' risk factor for CHD)        LDL Reference Ranges  (U.S. Department of Health and Human Services ATP III Classifcations)    Optimal          <100 mg/dL  Near Optimal     100-129 mg/dL  Borderline High  130-159 mg/dL  High             160-189 mg/dL  Very High        >189 mg/dL        Imaging Results (last 24 hours)     Procedure Component Value Units Date/Time    MRI Brain With & Without Contrast [[id number]] Collected:  11/18/17 1806     Updated:  11/18/17 1806    Narrative:       MRI OF THE BRAIN WITH AND WITHOUT CONTRAST 11/18/2017     HISTORY:  Seizure. Head trauma. Evaluate for possible hemorrhage.     Multiple pre and postcontrast sagittal, axial, coronal images were  obtained through the brain.     The diffusion-weighted images show no evidence of acute infarction.     FLAIR images show a few scattered foci of bright signal intensity in the  bilateral cerebral white matter consistent with mild small vessel white  matter ischemic disease.     There is no evidence of intracranial hemorrhage.     There is mild diffuse atrophy.     No abnormal enhancement is seen.     Craniocervical junction and sella appear normal.     Some of the images are degraded by patient motion.     Normal-appearing vascular flow voids are seen.       Impression:       1. No evidence of acute infarction or hemorrhage.  2. Minimal changes of small vessel white matter ischemic disease.       CT Head Without Contrast [[id number]] Collected:  11/18/17 0458     Updated:  11/19/17 0149    Narrative:       CT SCAN OF THE BRAIN WITHOUT CONTRAST.     TECHNIQUE: Radiation dose reduction techniques were utilized, including  automated exposure control and exposure modulation based on body size.  Multiple axial images of the brain were obtained from the vertex to the  base of the brain.     HISTORY:  Trauma, seizure.     COMPARISON:  No prior studies for comparison.     FINDINGS:   Midline structures are within normal limits, there is no hydrocephalus.  Gray-white matter differentiation is maintained. In the high left  frontal lobe medially there is a 0.4 cm focus of increased density which  may be further evaluated. Small amount of hemorrhage cannot be excluded.     Orbits are within normal limits. No significant mucosal disease of the  para-nasal sinuses. Mastoid air cells are well aerated.              Impression:       1.  In the left medial frontal lobe close to the vertex there is a 0.4  cm focus of increased density, small hemorrhage cannot be excluded.  2.  Follow-up CT scan is  recommended.         Findings called to Dr. Melvin, 5:10 AM.     This report was finalized on 11/19/2017 1:46 AM by Dr. Chad Pierce MD.             Current Facility-Administered Medications:   •  ALPRAZolam (XANAX) tablet 0.25 mg, 0.25 mg, Oral, 4x Daily PRN, Yoav Madsen MD, 0.25 mg at 11/19/17 1012  •  amLODIPine (NORVASC) tablet 10 mg, 10 mg, Oral, Q24H, Yoav Madsen MD, 10 mg at 11/19/17 0932  •  baclofen (LIORESAL) tablet 10 mg, 10 mg, Oral, Q8H, Yoav Madsen MD, 10 mg at 11/19/17 0615  •  estradiol (ESTRACE) tablet 0.5 mg, 0.5 mg, Oral, Daily, Yoav Madsen MD, 0.5 mg at 11/19/17 0932  •  gabapentin (NEURONTIN) capsule 300 mg, 300 mg, Oral, Q8H, Yoav Madsen MD, 300 mg at 11/19/17 0615  •  HYDROcodone-acetaminophen (NORCO) 5-325 MG per tablet 1 tablet, 1 tablet, Oral, Q4H PRN, Yoav Madsen MD, 1 tablet at 11/19/17 1121  •  ipratropium-albuterol (DUO-NEB) nebulizer solution 3 mL, 3 mL, Nebulization, Q4H - RT, Yoav Madsen MD  •  levETIRAcetam in NaCl 0.82% (KEPPRA) IVPB 500 mg, 500 mg, Intravenous, Q12H, Yoav Madsen MD, Last Rate: 400 mL/hr at 11/18/17 2122, 500 mg at 11/19/17 0930  •  losartan (COZAAR) tablet 100 mg, 100 mg, Oral, Q24H, Yoav Madsen MD, 100 mg at 11/19/17 0932  •  pantoprazole (PROTONIX) EC tablet 40 mg, 40 mg, Oral, QAM, Yoav Madsen MD, 40 mg at 11/19/17 0615  •  Insert peripheral IV, , , Once **AND** sodium chloride 0.9 % flush 10 mL, 10 mL, Intravenous, PRN, Gavino Melvin MD  •  Insert peripheral IV, , , Once **AND** sodium chloride 0.9 % flush 10 mL, 10 mL, Intravenous, PRN, Gavino Melvin MD  •  traZODone (DESYREL) tablet 100 mg, 100 mg, Oral, Nightly, Yoav Madsen MD, 100 mg at 11/18/17 2125     ASSESSMENT  New onset seizure disorder  Density left medial frontal lobe brain/small hemorrhage  COPD  Hypertension  Osteoarthritis  Tobacco abuse  Low back pain  Anxiety disorder  Depression  Chronic pain syndrome    PLAN  CPM  Keppra IV twice a day  Check  EEG  Neuro consult  appreciated  Continue home medications  Check right knee x-ray and consult orthopedic  Supportive care  Stress ulcer DVT prophylaxis  PT/OT  Follow closely further recommendation chronic hospital course    DANIKA GLEZ MD

## 2017-11-19 NOTE — PLAN OF CARE
Problem: Patient Care Overview (Adult)  Goal: Plan of Care Review  Outcome: Ongoing (interventions implemented as appropriate)    11/19/17 0242   Coping/Psychosocial Response Interventions   Plan Of Care Reviewed With patient   Patient Care Overview   Progress improving   Outcome Evaluation   Outcome Summary/Follow up Plan A+O X 4. No epileptic episodes noted/reported on this shift. PRN pain med administered r/t Rt knee + ankle pain, effective and sleeping. Dose of IV Keppra given tonight. Sleeping w/o SOA at rest.         Problem: Fall Risk (Adult)  Goal: Absence of Falls  Outcome: Outcome(s) achieved Date Met:  11/19/17    Problem: Seizure Disorder/Epilepsy (Adult)  Goal: Signs and Symptoms of Listed Potential Problems Will be Absent or Manageable (Seizure Disorder/Epilepsy)  Outcome: Ongoing (interventions implemented as appropriate)    Problem: Skin Integrity Impairment, Risk/Actual (Adult)  Goal: Identify Related Risk Factors and Signs and Symptoms  Outcome: Ongoing (interventions implemented as appropriate)  Goal: Skin Integrity/Wound Healing  Outcome: Ongoing (interventions implemented as appropriate)

## 2017-11-19 NOTE — PLAN OF CARE
Problem: Patient Care Overview (Adult)  Goal: Plan of Care Review  Outcome: Ongoing (interventions implemented as appropriate)    11/18/17 0659   Coping/Psychosocial Response Interventions   Plan Of Care Reviewed With patient   Patient Care Overview   Progress no change   Outcome Evaluation    Outcome Summary/Follow up Plan New admit r/t fall/sezure like symptoms. C/o pain 10/10, pain med administered in ED. Attempted to verify home meds w/[facility], no refill since last May. Will call [facility], pt's new pharmacy to verify home meds, and notiify Dr. Madsen, admitting. In Room resting w/o SOA at rest.         Problem: Fall Risk (Adult)  Goal: Identify Related Risk Factors and Signs and Symptoms  Outcome: Outcome(s) achieved Date Met:  11/18/17  Goal: Absence of Falls  Outcome: Ongoing (interventions implemented as appropriate)    Problem: Seizure Disorder/Epilepsy (Adult)  Goal: Signs and Symptoms of Listed Potential Problems Will be Absent or Manageable (Seizure Disorder/Epilepsy)  Outcome: Ongoing (interventions implemented as appropriate)    Problem: Skin Integrity Impairment, Risk/Actual (Adult)  Goal: Identify Related Risk Factors and Signs and Symptoms  Outcome: Ongoing (interventions implemented as appropriate)  Goal: Skin Integrity/Wound Healing  Outcome: Ongoing (interventions implemented as appropriate)

## 2017-11-19 NOTE — PLAN OF CARE
Problem: Patient Care Overview (Adult)  Goal: Plan of Care Review  Outcome: Ongoing (interventions implemented as appropriate)    11/19/17 1441   Coping/Psychosocial Response Interventions   Plan Of Care Reviewed With patient   Patient Care Overview   Progress no change   Outcome Evaluation   Outcome Summary/Follow up Plan vitals stable. patient c/o pain- medicated thoughout the shift. Patient up with PT today, transfering from bed to wheelchair and from wheelchair to bathroom, will not attempt to walk, complaing of right knee pain. Ortho consulted and xray taken of knee- patient believes it is 'dislocated'. no family at the bedside. no signs of seizure today.          Problem: Fall Risk (Adult)  Goal: Absence of Falls  Outcome: Ongoing (interventions implemented as appropriate)    Problem: Seizure Disorder/Epilepsy (Adult)  Goal: Signs and Symptoms of Listed Potential Problems Will be Absent or Manageable (Seizure Disorder/Epilepsy)  Outcome: Ongoing (interventions implemented as appropriate)    Problem: Skin Integrity Impairment, Risk/Actual (Adult)  Goal: Identify Related Risk Factors and Signs and Symptoms  Outcome: Ongoing (interventions implemented as appropriate)

## 2017-11-20 ENCOUNTER — TELEPHONE (OUTPATIENT)
Dept: ORTHOPEDIC SURGERY | Facility: CLINIC | Age: 57
End: 2017-11-20

## 2017-11-20 ENCOUNTER — APPOINTMENT (OUTPATIENT)
Dept: NEUROLOGY | Facility: HOSPITAL | Age: 57
End: 2017-11-20
Attending: HOSPITALIST

## 2017-11-20 PROCEDURE — 97110 THERAPEUTIC EXERCISES: CPT

## 2017-11-20 PROCEDURE — G0378 HOSPITAL OBSERVATION PER HR: HCPCS

## 2017-11-20 PROCEDURE — 25010000002 LEVETIRACETAM IN NACL 0.82% 500 MG/100ML SOLUTION: Performed by: HOSPITALIST

## 2017-11-20 PROCEDURE — 95816 EEG AWAKE AND DROWSY: CPT | Performed by: PSYCHIATRY & NEUROLOGY

## 2017-11-20 PROCEDURE — 95816 EEG AWAKE AND DROWSY: CPT

## 2017-11-20 PROCEDURE — 97161 PT EVAL LOW COMPLEX 20 MIN: CPT

## 2017-11-20 RX ORDER — HYDROCODONE BITARTRATE AND ACETAMINOPHEN 5; 325 MG/1; MG/1
2 TABLET ORAL EVERY 4 HOURS PRN
Status: DISCONTINUED | OUTPATIENT
Start: 2017-11-20 | End: 2017-11-21

## 2017-11-20 RX ADMIN — LOSARTAN POTASSIUM 25 MG: 25 TABLET, FILM COATED ORAL at 08:40

## 2017-11-20 RX ADMIN — BACLOFEN 10 MG: 10 TABLET ORAL at 21:35

## 2017-11-20 RX ADMIN — ESTRADIOL 0.5 MG: 1 TABLET ORAL at 08:40

## 2017-11-20 RX ADMIN — HYDROCODONE BITARTRATE AND ACETAMINOPHEN 2 TABLET: 5; 325 TABLET ORAL at 20:39

## 2017-11-20 RX ADMIN — GABAPENTIN 300 MG: 300 CAPSULE ORAL at 21:35

## 2017-11-20 RX ADMIN — PANTOPRAZOLE SODIUM 40 MG: 40 TABLET, DELAYED RELEASE ORAL at 06:27

## 2017-11-20 RX ADMIN — ALPRAZOLAM 0.25 MG: 0.25 TABLET ORAL at 08:41

## 2017-11-20 RX ADMIN — ALPRAZOLAM 0.25 MG: 0.25 TABLET ORAL at 12:50

## 2017-11-20 RX ADMIN — HYDROCODONE BITARTRATE AND ACETAMINOPHEN 2 TABLET: 5; 325 TABLET ORAL at 15:34

## 2017-11-20 RX ADMIN — TRAZODONE HYDROCHLORIDE 100 MG: 100 TABLET, FILM COATED ORAL at 21:35

## 2017-11-20 RX ADMIN — BACLOFEN 10 MG: 10 TABLET ORAL at 15:13

## 2017-11-20 RX ADMIN — BACLOFEN 10 MG: 10 TABLET ORAL at 06:26

## 2017-11-20 RX ADMIN — ALPRAZOLAM 0.25 MG: 0.25 TABLET ORAL at 21:35

## 2017-11-20 RX ADMIN — HYDROCODONE BITARTRATE AND ACETAMINOPHEN 1 TABLET: 5; 325 TABLET ORAL at 10:14

## 2017-11-20 RX ADMIN — GABAPENTIN 300 MG: 300 CAPSULE ORAL at 15:14

## 2017-11-20 RX ADMIN — HYDROCODONE BITARTRATE AND ACETAMINOPHEN 1 TABLET: 5; 325 TABLET ORAL at 06:26

## 2017-11-20 RX ADMIN — AMLODIPINE BESYLATE 10 MG: 10 TABLET ORAL at 08:40

## 2017-11-20 RX ADMIN — GABAPENTIN 300 MG: 300 CAPSULE ORAL at 06:27

## 2017-11-20 RX ADMIN — LEVETIRACETAM 500 MG: 500 INJECTION, SOLUTION INTRAVENOUS at 08:41

## 2017-11-20 NOTE — PLAN OF CARE
Problem: Patient Care Overview (Adult)  Goal: Plan of Care Review  Outcome: Ongoing (interventions implemented as appropriate)    11/20/17 1040   Coping/Psychosocial Response Interventions   Plan Of Care Reviewed With patient   Outcome Evaluation   Outcome Summary/Follow up Plan pt presents with diffiuclty walking, she will benefit from PT to address for safe d.c home. will cont to follow for ortho consult and recommendations, pt is presently unable to bear weight on RLE, has limited ROM R knee, and patella appears dislocated.         Problem: Inpatient Physical Therapy  Goal: Transfer Training Goal 1 LTG- PT  Outcome: Ongoing (interventions implemented as appropriate)    11/20/17 1040   Transfer Training PT LTG   Transfer Training PT LTG, Date Established 11/20/17   Transfer Training PT LTG, Time to Achieve 1 wk   Transfer Training PT LTG, Activity Type sit to stand/stand to sit   Transfer Training PT LTG, [place] Level conditional independence       Goal: Gait Training Goal LTG- PT  Outcome: Ongoing (interventions implemented as appropriate)    11/20/17 1040   Gait Training PT LTG   Gait Training Goal PT LTG, Date Established 11/20/17   Gait Training Goal PT LTG, Time to Achieve 1 wk   Gait Training Goal PT LTG, [place] Level conditional independence   Gait Training Goal PT LTG, Assist Device walker, rolling   Gait Training Goal PT LTG, Distance to Achieve 25 ft

## 2017-11-20 NOTE — PLAN OF CARE
Problem: Patient Care Overview (Adult)  Goal: Plan of Care Review  Outcome: Ongoing (interventions implemented as appropriate)    11/20/17 9269   Coping/Psychosocial Response Interventions   Plan Of Care Reviewed With patient   Patient Care Overview   Progress improving   Outcome Evaluation   Outcome Summary/Follow up Plan No falls reported. VSS. EEG today. Patient c/o constant pain in her right knee/ankle. Pain medicine increased. Will continue to monitor.       Goal: Adult Individualization and Mutuality  Outcome: Ongoing (interventions implemented as appropriate)  Goal: Discharge Needs Assessment  Outcome: Ongoing (interventions implemented as appropriate)    Problem: Seizure Disorder/Epilepsy (Adult)  Goal: Signs and Symptoms of Listed Potential Problems Will be Absent or Manageable (Seizure Disorder/Epilepsy)  Outcome: Ongoing (interventions implemented as appropriate)    Problem: Skin Integrity Impairment, Risk/Actual (Adult)  Goal: Identify Related Risk Factors and Signs and Symptoms  Outcome: Ongoing (interventions implemented as appropriate)  Goal: Skin Integrity/Wound Healing  Outcome: Ongoing (interventions implemented as appropriate)

## 2017-11-20 NOTE — THERAPY RE-EVALUATION
Acute Care - Physical Therapy Re-Evaluation  [facility]     Patient Name: Seda Urbina  : 1960  MRN: [id number]  Today's Date: 2017   Onset of Illness/Injury or Date of Surgery Date: 17  Date of Referral to PT: 17  Referring Physician: Cale      Admit Date: 2017     Visit Dx:    ICD-10-CM ICD-9-CM   1. Seizure-like activity R56.9 780.39   2. Chronic pain of right knee M25.561 719.46    G89.29 338.29   3. Abnormal CT scan, head R93.0 793.0     Patient Active Problem List   Diagnosis   • Knee pain, bilateral   • Primary osteoarthritis of right knee   • Status post total knee replacement, left   • Tear of deltoid ligament of ankle   • Dislocation of right knee   • Acute pain of right knee   • Seizure-like activity     Past Medical History:   Diagnosis Date   • Anxiety    • Depression    • Hypertension      Past Surgical History:   Procedure Laterality Date   • ANKLE SURGERY Left    • HAND SURGERY Right    • KNEE SURGERY Left           PT ASSESSMENT (last 72 hours)      PT Evaluation       17 1030 17 1119    Rehab Evaluation    Document Type re-evaluation  -PC evaluation  -MA    Subjective Information agree to therapy  -PC agree to therapy   patient reports R knee dislocation-refused amb due to this  -MA    Patient Effort, Rehab Treatment adequate  -PC good  -MA    Symptoms Noted During/After Treatment increased pain  -PC increased pain   Denies any seizure-like sx during eval  -MA    General Information    Patient Profile Review  yes  -MA    Onset of Illness/Injury or Date of Surgery Date  17  -MA    Referring Physician Cale  -SABRINA Madsen  -MA    General Observations in bed, min distress with pain  -PC supine in bed with HOB elevated, no acute distress noted at rest  -MA    Pertinent History Of Current Problem R patellar dislocation, ortho consulted but has not seen yet  -PC Patient admitted for possibe seizure but patient unaware and does not remember.  Per patient, patient stood up from commode at home and dislocated her R knee. No report of fall. Patient states she hasnt been able to walk since.   -MA    Precautions/Limitations --   pt unable to bear weight with R patellar dislocated  -PC no known precautions/limitations  -MA    Prior Level of Function --   pt is supposed to wear brace R knee, it is at home  -PC independent:;all household mobility;using stairs  -MA    Equipment Currently Used at Home cane, straight;wheelchair  -PC     Plans/Goals Discussed With patient  -PC patient  -MA    Clinical Impression    Date of Referral to PT 11/20/17  -PC 11/19/17  -MA    Criteria for Skilled Therapeutic Interventions Met yes;treatment indicated  -PC no;no problems identified which require skilled intervention  -MA    Impairments Found (describe specific impairments) gait, locomotion, and balance  -PC     Rehab Potential fair, will monitor progress closely  -PC     Pain Assessment    Pain Assessment 0-10  -PC 0-10  -MA    Pain Score 7  -PC 7  -MA    Pain Location Knee  -PC Knee  -MA    Pain Orientation Right  -PC Right  -MA    Pain Intervention(s) Repositioned  -PC Repositioned;Ambulation/increased activity;Rest  -MA    Response to Interventions --   pt and RN working on pain med schedule  -PC Notified RN regarding pain level and patient wanting her pain medicine. Voiced to patient that she is not eligible for next pain med. Patient voiced wanting pain medicine multiple times despite information given.   -MA    Vision Assessment/Intervention    Visual Impairment  WFL  -MA    Cognitive Assessment/Intervention    Current Cognitive/Communication Assessment functional  -PC functional  -MA    Orientation Status oriented x 4  -PC oriented x 4  -MA    Follows Commands/Answers Questions 100% of the time  -% of the time;able to follow multi-step instructions  -MA    Personal Safety WNL/WFL  -PC WNL/WFL;good awareness, safety precautions  -MA    Personal Safety  Interventions fall prevention program maintained;gait belt  -PC fall prevention program maintained;gait belt;nonskid shoes/slippers when out of bed  -MA    ROM (Range of Motion)    General ROM Detail RLE limited 15-80, all others WFL  -PC L LE WFL, patient would not extend R LE due to pain  -MA    MMT (Manual Muscle Testing)    General MMT Assessment  no strength deficits identified  -MA    General MMT Assessment Detail BUE WFL, LLE WFL, RLE 3/5, unable to tolerate resistance due to knee injury  -PC     Bed Mobility, Assessment/Treatment    Bed Mobility, Assistive Device  head of bed elevated  -MA    Bed Mobility, Scoot/Bridge, [place] independent  -PC independent  -MA    Bed Mob, Supine to Sit, [place] independent  -PC independent  -MA    Bed Mob, Sit to Supine, [place]  not tested  -MA    Transfer Assessment/Treatment    Transfers, Bed-Chair [place]  set up required;supervision required   bed<>wheelchair  -MA    Transfers, Sit-Stand [place] contact guard assist  -PC     Transfers, Stand-Sit [place] contact guard assist  -PC     Toilet Transfer, [place]  set up required;supervision required  -MA    Transfer, Comment  No report of pain during transfer.  -MA    Gait Assessment/Treatment    Gait, [place] Level contact guard assist  -PC     Gait, Assistive Device rolling walker  -PC     Gait, Distance (Feet) 3  -PC     Gait, Comment pt agreeable to try to use walker, bed to chair, NWB RLE  -PC Patient refused all ambulation this date due to stating her R knee is dislocated.  -MA    Therapy Exercises    Left Lower Extremity  AROM:;10 reps;sitting;LAQ  -MA    Positioning and Restraints    Pre-Treatment Position in bed  -PC in bed  -MA    Post Treatment Position chair  -PC chair  -MA    In Chair reclined;call light within reach;encouraged to call for assist  -PC notified nsg;sitting;call light within reach;encouraged to call for assist  -MA      11/18/17 7918       General  Information    Equipment Currently Used at Home cane, straight  -HT     Living Environment    Lives With significant other  -HT     Living Arrangements mobile home  -HT     Home Accessibility no concerns  -HT     Stair Railings at Home none  -HT     Type of Financial/Environmental Concern none  -HT     Transportation Available car;family or friend will provide  -HT       User Key  (r) = Recorded By, (t) = Taken By, (c) = Cosigned By    Initials Name Provider Type    PC Rachell Sloan, PT Physical Therapist    HT Sandy Aranda, RN Registered Nurse    GLADYS Gandhi, PT Physical Therapist          Physical Therapy Education     Title: PT OT SLP Therapies (Active)     Topic: Physical Therapy (Active)     Point: Mobility training (Active)    Learning Progress Summary    Learner Readiness Method Response Comment Documented by Status   Patient Acceptance E,D NR   11/20/17 1040 Active    Acceptance E VU  MA 11/19/17 1133 Done               Point: Body mechanics (Active)    Learning Progress Summary    Learner Readiness Method Response Comment Documented by Status   Patient Acceptance E,D NR   11/20/17 1040 Active               Point: Precautions (Active)    Learning Progress Summary    Learner Readiness Method Response Comment Documented by Status   Patient Acceptance E,D NR   11/20/17 1040 Active                      User Key     Initials Effective Dates Name Provider Type Discipline     12/01/15 -  Rachell Sloan, PT Physical Therapist PT    MA 12/13/16 -  Lisa Gandhi, PT Physical Therapist PT                PT Recommendation and Plan  Anticipated Equipment Needs At Discharge: front wheeled walker (brace R knee)  Anticipated Discharge Disposition: home with assist  PT Frequency: daily  Plan of Care Review  Plan Of Care Reviewed With: patient  Outcome Summary/Follow up Plan: pt presents with diffiuclty walking, she will benefit from PT to address for safe d.c home.  will cont to follow for ortho  consult and recommendations           IP PT Goals       11/20/17 1040          Transfer Training PT LTG    Transfer Training PT LTG, Date Established 11/20/17  -PC      Transfer Training PT LTG, Time to Achieve 1 wk  -PC      Transfer Training PT LTG, Activity Type sit to stand/stand to sit  -PC      Transfer Training PT LTG, [place] Level conditional independence  -PC      Gait Training PT LTG    Gait Training Goal PT LTG, Date Established 11/20/17  -PC      Gait Training Goal PT LTG, Time to Achieve 1 wk  -PC      Gait Training Goal PT LTG, [place] Level conditional independence  -PC      Gait Training Goal PT LTG, Assist Device walker, rolling  -PC      Gait Training Goal PT LTG, Distance to Achieve 25 ft  -PC        User Key  (r) = Recorded By, (t) = Taken By, (c) = Cosigned By    Initials Name Provider Type    PC Rachell Sloan, PT Physical Therapist                Outcome Measures       11/20/17 1000 11/19/17 1100       How much help from another person do you currently need...    Turning from your back to your side while in flat bed without using bedrails? 4  -PC 4  -MA     Moving from lying on back to sitting on the side of a flat bed without bedrails? 4  -PC 4  -MA     Moving to and from a bed to a chair (including a wheelchair)? 4  -PC 4  -MA     Standing up from a chair using your arms (e.g., wheelchair, bedside chair)? 3  -PC 4  -MA     Climbing 3-5 steps with a railing? 2  -PC 1  -MA     To walk in hospital room? 3  -PC 1  -MA     AM-PAC 6 Clicks Score 20  -PC 18  -MA     Functional Assessment    Outcome Measure Options  AM-PAC 6 Clicks Basic Mobility (PT)  -MA       User Key  (r) = Recorded By, (t) = Taken By, (c) = Cosigned By    Initials Name Provider Type    PC Rachell Sloan, PT Physical Therapist    GLADYS Gandhi, PT Physical Therapist           Time Calculation:         PT Charges       11/20/17 1048          Time Calculation    Start Time 0950  -PC      Stop Time 1007  -PC       Time Calculation (min) 17 min  -PC      PT Received On 11/20/17  -PC      PT - Next Appointment 11/21/17  -PC      PT Goal Re-Cert Due Date 11/27/17  -PC        User Key  (r) = Recorded By, (t) = Taken By, (c) = Cosigned By    Initials Name Provider Type    PC Rachell Sloan PT Physical Therapist          Therapy Charges for Today     Code Description Service Date Service Provider Modifiers Qty    03419710835 HC PT EVAL LOW COMPLEXITY 2 11/20/2017 Rachell Sloan, PT GP 1    45107831828 HC PT THER PROC EA 15 MIN 11/20/2017 Rachell Sloan PT GP 1          PT G-Codes  PT Professional Judgement Used?: Yes  Outcome Measure Options: AM-PAC 6 Clicks Basic Mobility (PT)  Functional Limitation: Mobility: Walking and moving around  Mobility: Walking and Moving Around Current Status (): At least 60 percent but less than 80 percent impaired, limited or restricted  Mobility: Walking and Moving Around Goal Status (): 0 percent impaired, limited or restricted  Mobility: Walking and Moving Around Discharge Status (): 0 percent impaired, limited or restricted      Rachell Sloan PT  11/20/2017

## 2017-11-20 NOTE — SIGNIFICANT NOTE
11/20/17 1111   Rehab Treatment   Discipline occupational therapist   Rehab Evaluation   Evaluation Not Performed other (see comments)  (await ortho consult to initiate OT eval)

## 2017-11-20 NOTE — PLAN OF CARE
Problem: Patient Care Overview (Adult)  Goal: Plan of Care Review  Outcome: Ongoing (interventions implemented as appropriate)    11/20/17 0435   Coping/Psychosocial Response Interventions   Plan Of Care Reviewed With patient   Patient Care Overview   Progress improving   Outcome Evaluation   Outcome Summary/Follow up Plan A+O X 4 w/all V/S WDL. On IV Keppra, EEG today? PRN pain med aministered to alleviate Rt knee + ankle pain. Able to self transfered from bed to w/c and vice-versa w/o difficulty. Sleeping w/o SOA at rest.         Problem: Fall Risk (Adult)  Goal: Absence of Falls  Outcome: Outcome(s) achieved Date Met:  11/20/17    Problem: Seizure Disorder/Epilepsy (Adult)  Goal: Signs and Symptoms of Listed Potential Problems Will be Absent or Manageable (Seizure Disorder/Epilepsy)  Outcome: Ongoing (interventions implemented as appropriate)    Problem: Skin Integrity Impairment, Risk/Actual (Adult)  Goal: Identify Related Risk Factors and Signs and Symptoms  Outcome: Ongoing (interventions implemented as appropriate)  Goal: Skin Integrity/Wound Healing  Outcome: Ongoing (interventions implemented as appropriate)

## 2017-11-20 NOTE — PROGRESS NOTES
"Daily progress note    Chief complaint  Doing same  No new complaints    History of present illness  57-year-old white female with history of COPD hypertension anxiety depression and osteoarthritis low back pain chronic pain syndrome presents to [facility] emergency room with seizure-like activity did not witness.  Patient in ER complaining of right knee pain back pain.  Patient denies any headache chest pain increased shortness of breath abdominal pain nausea vomiting diarrhea.  Patient does not remember anything.  No loss of control of bowel and bladder no tongue biting and no history of previous seizure disorder.     REVIEW OF SYSTEMS  Review of Systems   Constitutional: Negative for fever.   HENT: Negative for sore throat.    Eyes: Negative.    Respiratory: Negative for cough and shortness of breath.    Cardiovascular: Negative for chest pain.   Gastrointestinal: Negative for abdominal pain, diarrhea and vomiting.   Genitourinary: Negative for dysuria.   Musculoskeletal: Negative for neck pain.        R chronic knee pain, chronic R thigh, and chronic R ankle   Skin: Negative for rash.   Allergic/Immunologic: Negative.    Neurological: Negative for weakness, numbness and headaches.   Hematological: Negative.    Psychiatric/Behavioral: Negative.    All other systems reviewed and are negative.     PHYSICAL EXAM  Blood pressure 109/71, pulse 76, temperature 98.3 °F (36.8 °C), temperature source Oral, resp. rate 18, height 66\" (167.6 cm), weight 178 lb (80.7 kg), SpO2 95 %.    Constitutional: She is oriented to person, place, and time and well-developed, well-nourished, and in no distress. No distress.   Head: Normocephalic and atraumatic.   Eyes: EOM are normal. Pupils are equal, round, and reactive to light.   Neck: Normal range of motion. Neck supple.   Cardiovascular: Normal rate, regular rhythm and normal heart sounds.    Pulmonary/Chest: Effort normal and breath sounds normal. No respiratory distress. "   Abdominal: Soft. There is no tenderness. There is no rebound and no guarding.   Musculoskeletal: Normal range of motion. She exhibits no edema.   Neurological: She is alert and oriented to person, place, and time. She has normal sensation and normal strength.   Skin: Skin is warm and dry. No rash noted.   Psychiatric: Mood and affect normal.     LAB RESULTS  Lab Results (last 24 hours)     ** No results found for the last 24 hours. **        Imaging Results (last 24 hours)     Procedure Component Value Units Date/Time    XR Knee 1 or 2 View Right [[id number]] Collected:  11/19/17 1458     Updated:  11/19/17 1502    Narrative:       RIGHT KNEE 2 VIEWS     HISTORY: 57 old female with acute right knee pain     COMPARISON: MRI of 11/09/2017     FINDINGS:  1. Interval increase in size of right knee effusion.  2. Pan compartmental osteoarthritis.  3. No acute osseous abnormality.     This report was finalized on 11/19/2017 2:59 PM by Dr. Issac Roberts MD.             Current Facility-Administered Medications:   •  ALPRAZolam (XANAX) tablet 0.25 mg, 0.25 mg, Oral, 4x Daily PRN, Yoav Madsen MD, 0.25 mg at 11/20/17 1250  •  amLODIPine (NORVASC) tablet 10 mg, 10 mg, Oral, Q24H, Yoav Madsen MD, 10 mg at 11/20/17 0840  •  baclofen (LIORESAL) tablet 10 mg, 10 mg, Oral, Q8H, Yoav Madsen MD, 10 mg at 11/20/17 0626  •  estradiol (ESTRACE) tablet 0.5 mg, 0.5 mg, Oral, Daily, Yoav Madsen MD, 0.5 mg at 11/20/17 0840  •  gabapentin (NEURONTIN) capsule 300 mg, 300 mg, Oral, Q8H, Yoav Madsen MD, 300 mg at 11/20/17 0627  •  HYDROcodone-acetaminophen (NORCO) 5-325 MG per tablet 1 tablet, 1 tablet, Oral, Q4H PRN, Yoav Madsen MD, 1 tablet at 11/20/17 1014  •  ipratropium-albuterol (DUO-NEB) nebulizer solution 3 mL, 3 mL, Nebulization, Q4H - RT, Yoav Madsen MD  •  levETIRAcetam in NaCl 0.82% (KEPPRA) IVPB 500 mg, 500 mg, Intravenous, Q12H, Yoav Madsen MD, Last Rate: 400 mL/hr at 11/19/17 2144, 500 mg at 11/20/17 0841  •  losartan  (COZAAR) tablet 25 mg, 25 mg, Oral, Q24H, Danika Madsen MD, 25 mg at 11/20/17 0840  •  pantoprazole (PROTONIX) EC tablet 40 mg, 40 mg, Oral, QAM, Danika Madsen MD, 40 mg at 11/20/17 0627  •  Insert peripheral IV, , , Once **AND** sodium chloride 0.9 % flush 10 mL, 10 mL, Intravenous, PRN, Gavino Melvin MD  •  Insert peripheral IV, , , Once **AND** sodium chloride 0.9 % flush 10 mL, 10 mL, Intravenous, PRN, Gavino Melvin MD  •  traZODone (DESYREL) tablet 100 mg, 100 mg, Oral, Nightly, Danika Madsen MD, 100 mg at 11/19/17 6253     ASSESSMENT  New onset seizure disorder?  Density left medial frontal lobe brain/small hemorrhage  Right knee effusion with severe arthritis  COPD  Hypertension  Osteoarthritis  Tobacco abuse  Low back pain  Anxiety disorder  Depression  Chronic pain syndrome    PLAN  CPM  EEG today  Continue home medications  Orthopedic surgery consult pending  Supportive care  Stress ulcer DVT prophylaxis  PT/OT  Follow closely further recommendation chronic hospital course    DANIKA MADSEN MD

## 2017-11-20 NOTE — TELEPHONE ENCOUNTER
Nurse called from [facility] wanting to know if you can do a consult on this patient. She came in with seizure activity but is having sever knee pain. Attending physician wants to know if you can drain the knee. He states that she will be most likely discharged tomorrow. Please call and advise, #[phone]/kassie

## 2017-11-20 NOTE — PROGRESS NOTES
Discharge Planning Assessment  [facility]     Patient Name: Seda Urbina  MRN: [id number]  Today's Date: 11/20/2017    Admit Date: 11/18/2017          Discharge Needs Assessment       11/20/17 1533    Living Environment    Lives With significant other    Living Arrangements mobile home    Home Accessibility stairs to enter home    Number of Stairs to Enter Home 4    Stair Railings at Home outside, present on right side    Transportation Available family or friend will provide   Pt does not drive.    Discharge Needs Assessment    Equipment Currently Used at Home cane, straight    Discharge Planning Comments Spoke with pt at bedside.  Facesheet info confirmed.  Pt lives in a mobile home with her significant other, Miguel Angel.  She ambulates with a cane and does not drive.  Miguel Angel provides transportation as needed.  He does work full time, so pt is alone at home during the day.  She is current with [facility].  Courtney/ KELTON notified.  Pt has been to rehab at [facility].   Discussed DC options including HH and SNF.  Pt feels she might need rehab before returning home.  She requests referral to [facility], but no beds are available.   Discussed other facilities near her home.  She requests referrals to [facility].   Referrals called to [facility].             Discharge Plan       11/20/17 1544    Case Management/Social Work Plan    Plan home with significant other and VNA HH  vs  SNF.  Referral to [facility] pending.  Will need [employer] precert.          Discharge Placement     Facility/Agency Request Status Selected? Address Phone Number Fax Number    [facility] Accepted     [address] [address] [phone]        Jennifer Villalobos RN 11/20/2017 14:44    Courtney notified.               [facility] Pending - Request Sent     [address] [address] [phone]        Jennifer Aponte  DANIEL Villalobos 11/20/2017 15:32    Referral to [facility] Pending - Request Sent     [address], [facility] [address] [phone]        Jennifer Villlaobos RN 11/20/2017 15:32    Referral to [facility] Declined     [address], [facility] [address] [phone]        Jennifer Villalobos RN 11/20/2017 15:06    Referral to [facility].  No beds available.                           Demographic Summary       11/20/17 1454    Primary Care Physician Information    Name Suellen LightDICK.   Extended Care House Calls      11/20/17 1445    Referral Information    Admission Type observation    Arrived From home or self-care    Referral Source admission list    Reason For Consult discharge planning    Record Reviewed medical record            Functional Status       11/20/17 1533    IADL    Medications assistive person    Meal Preparation assistive person;independent    Housekeeping assistive person;independent    Laundry assistive person;independent    Shopping assistive person    Oral Care independent      11/20/17 1454    Functional Status Current    Ambulation 4-->completely dependent    Transferring 1-->assistive equipment    Toileting 0-->independent    Bathing 2-->assistive person    Dressing 0-->independent    Eating 0-->independent    Communication 0-->understands/communicates without difficulty    Swallowing (if score 2 or more for any item, consult Rehab Services) 0-->swallows foods/liquids without difficulty    Functional Status Prior    Ambulation 1-->assistive equipment    Transferring 1-->assistive equipment    Toileting 1-->assistive equipment    Bathing 1-->assistive equipment    Dressing 1-->assistive equipment    Eating 0-->independent    Communication 0-->understands/communicates without difficulty    Swallowing 0-->swallows foods/liquids without difficulty            Patient Forms       11/20/17  1539    Patient Forms    Provider Choice List Delivered    Delivered to Patient    Method of delivery In person          Jennifer Villalobos, RN

## 2017-11-21 VITALS
TEMPERATURE: 98.6 F | HEART RATE: 81 BPM | OXYGEN SATURATION: 98 % | HEIGHT: 66 IN | DIASTOLIC BLOOD PRESSURE: 78 MMHG | RESPIRATION RATE: 18 BRPM | BODY MASS INDEX: 28.61 KG/M2 | SYSTOLIC BLOOD PRESSURE: 123 MMHG | WEIGHT: 178 LBS

## 2017-11-21 LAB
ANION GAP SERPL CALCULATED.3IONS-SCNC: 10.2 MMOL/L
BUN BLD-MCNC: 16 MG/DL (ref 6–20)
BUN/CREAT SERPL: 34 (ref 7–25)
CALCIUM SPEC-SCNC: 8.7 MG/DL (ref 8.6–10.5)
CHLORIDE SERPL-SCNC: 106 MMOL/L (ref 98–107)
CO2 SERPL-SCNC: 26.8 MMOL/L (ref 22–29)
CREAT BLD-MCNC: 0.47 MG/DL (ref 0.57–1)
GFR SERPL CREATININE-BSD FRML MDRD: 137 ML/MIN/1.73
GLUCOSE BLD-MCNC: 94 MG/DL (ref 65–99)
POTASSIUM BLD-SCNC: 4 MMOL/L (ref 3.5–5.2)
SODIUM BLD-SCNC: 143 MMOL/L (ref 136–145)

## 2017-11-21 PROCEDURE — 97110 THERAPEUTIC EXERCISES: CPT

## 2017-11-21 PROCEDURE — G0378 HOSPITAL OBSERVATION PER HR: HCPCS

## 2017-11-21 PROCEDURE — 80048 BASIC METABOLIC PNL TOTAL CA: CPT | Performed by: HOSPITALIST

## 2017-11-21 RX ORDER — HYDROCODONE BITARTRATE AND ACETAMINOPHEN 5; 325 MG/1; MG/1
1 TABLET ORAL ONCE
Status: COMPLETED | OUTPATIENT
Start: 2017-11-21 | End: 2017-11-21

## 2017-11-21 RX ORDER — GABAPENTIN 300 MG/1
300 CAPSULE ORAL EVERY 8 HOURS SCHEDULED
Qty: 9 CAPSULE | Refills: 0 | Status: SHIPPED | OUTPATIENT
Start: 2017-11-21 | End: 2017-11-24

## 2017-11-21 RX ORDER — HYDROCODONE BITARTRATE AND ACETAMINOPHEN 5; 325 MG/1; MG/1
1 TABLET ORAL EVERY 6 HOURS PRN
Qty: 10 TABLET | Refills: 0 | Status: SHIPPED | OUTPATIENT
Start: 2017-11-21 | End: 2017-11-22 | Stop reason: SDUPTHER

## 2017-11-21 RX ADMIN — ESTRADIOL 0.5 MG: 1 TABLET ORAL at 08:19

## 2017-11-21 RX ADMIN — HYDROCODONE BITARTRATE AND ACETAMINOPHEN 2 TABLET: 5; 325 TABLET ORAL at 05:09

## 2017-11-21 RX ADMIN — HYDROCODONE BITARTRATE AND ACETAMINOPHEN 1 TABLET: 5; 325 TABLET ORAL at 15:16

## 2017-11-21 RX ADMIN — BACLOFEN 10 MG: 10 TABLET ORAL at 14:56

## 2017-11-21 RX ADMIN — GABAPENTIN 300 MG: 300 CAPSULE ORAL at 06:28

## 2017-11-21 RX ADMIN — GABAPENTIN 300 MG: 300 CAPSULE ORAL at 14:55

## 2017-11-21 RX ADMIN — ALPRAZOLAM 0.25 MG: 0.25 TABLET ORAL at 08:19

## 2017-11-21 RX ADMIN — HYDROCODONE BITARTRATE AND ACETAMINOPHEN 2 TABLET: 5; 325 TABLET ORAL at 09:35

## 2017-11-21 RX ADMIN — BACLOFEN 10 MG: 10 TABLET ORAL at 06:28

## 2017-11-21 RX ADMIN — AMLODIPINE BESYLATE 10 MG: 10 TABLET ORAL at 08:19

## 2017-11-21 RX ADMIN — PANTOPRAZOLE SODIUM 40 MG: 40 TABLET, DELAYED RELEASE ORAL at 06:28

## 2017-11-21 NOTE — DISCHARGE SUMMARY
Discharge summary    Date of admission 11/18/2017  Date of discharge 11/21/2017    Final diagnosis  No evidence of seizure disorder  Density left medial frontal lobe brain/small hemorrhage/Not visible in MRI brain  Right knee effusion with severe arthritis  COPD  Hypertension  Osteoarthritis  Tobacco abuse  Low back pain  Anxiety disorder  Depression  Chronic pain syndrome    Discharge medications    Current Facility-Administered Medications:   •  amLODIPine (NORVASC) tablet 10 mg, 10 mg, Oral, Q24H, Yoav Madsen MD, 10 mg at 11/21/17 0819  •  baclofen (LIORESAL) tablet 10 mg, 10 mg, Oral, Q8H, Yoav Madsen MD, 10 mg at 11/21/17 0628  •  estradiol (ESTRACE) tablet 0.5 mg, 0.5 mg, Oral, Daily, Yoav Madsen MD, 0.5 mg at 11/21/17 0819  •  gabapentin (NEURONTIN) capsule 300 mg, 300 mg, Oral, Q8H, Yoav Madsen MD, 300 mg at 11/21/17 0628  •  pantoprazole (PROTONIX) EC tablet 40 mg, 40 mg, Oral, QAM, Yoav Madsen MD, 40 mg at 11/21/17 0628  •  Insert peripheral IV, , , Once **AND** sodium chloride 0.9 % flush 10 mL, 10 mL, Intravenous, PRN, Gavino Melvin MD  •  Insert peripheral IV, , , Once **AND** sodium chloride 0.9 % flush 10 mL, 10 mL, Intravenous, PRN, Gavino Melvin MD  •  traZODone (DESYREL) tablet 100 mg, 100 mg, Oral, Nightly, Yoav Madsen MD, 100 mg at 11/20/17 2135     Consult obtained  Neurology  Orthopedic surgery    Procedures  None    Hospital course  57 year white female with history of COPD hypertension anxiety depression chronic pain syndrome admitted through emergency room with possible seizure-like activity.  Patient workup in ER revealed density left medial frontal lobe brain consistent with small hemorrhage admit for management.  Patient admitted she's treated with IV Keppra and MRI showed no evidence of any hemorrhage.  Patient has had EEG done which is essentially negative Keppra stopped he had patient also complained of right knee pain and workup revealed severe right pleural effusion  with arthritis changes.  Patient orthopedic surgery consult obtained but they want to see him in the office tomorrow.  Patient cleared with neurology to be discharged.    Discharge diet regular    Activity as tolerated    Medication as above    Follow-up with primary doctor in 1 week and follow with orthopedic in the morning and take medications directed    DANIKA GLEZ MD

## 2017-11-21 NOTE — PLAN OF CARE
Problem: Patient Care Overview (Adult)  Goal: Plan of Care Review  Outcome: Ongoing (interventions implemented as appropriate)    11/21/17 0423   Coping/Psychosocial Response Interventions   Plan Of Care Reviewed With patient   Patient Care Overview   Progress improving   Outcome Evaluation   Outcome Summary/Follow up Plan A+O X 4 w/all V/S WDL. Capable going to bathroom propelling w/c, still unable to stand up d/t RLE (Knee + ankle) pain. PRN pain + anxiety meds administered d/t c/o Rt knee/ankle pain and panic attacks. Sleeping w/o SOA at rest..          Problem: Seizure Disorder/Epilepsy (Adult)  Goal: Signs and Symptoms of Listed Potential Problems Will be Absent or Manageable (Seizure Disorder/Epilepsy)  Outcome: Ongoing (interventions implemented as appropriate)    Problem: Skin Integrity Impairment, Risk/Actual (Adult)  Goal: Identify Related Risk Factors and Signs and Symptoms  Outcome: Ongoing (interventions implemented as appropriate)  Goal: Skin Integrity/Wound Healing  Outcome: Ongoing (interventions implemented as appropriate)

## 2017-11-21 NOTE — PROGRESS NOTES
"Daily progress note    Chief complaint  Doing same  No new complaints    History of present illness  57-year-old white female with history of COPD hypertension anxiety depression and osteoarthritis low back pain chronic pain syndrome presents to [facility] emergency room with seizure-like activity did not witness.  Patient in ER complaining of right knee pain back pain.  Patient denies any headache chest pain increased shortness of breath abdominal pain nausea vomiting diarrhea.  Patient does not remember anything.  No loss of control of bowel and bladder no tongue biting and no history of previous seizure disorder.     REVIEW OF SYSTEMS  Review of Systems   Constitutional: Negative for fever.   HENT: Negative for sore throat.    Eyes: Negative.    Respiratory: Negative for cough and shortness of breath.    Cardiovascular: Negative for chest pain.   Gastrointestinal: Negative for abdominal pain, diarrhea and vomiting.   Genitourinary: Negative for dysuria.   Musculoskeletal: Negative for neck pain.        R chronic knee pain, chronic R thigh, and chronic R ankle   Skin: Negative for rash.   Allergic/Immunologic: Negative.    Neurological: Negative for weakness, numbness and headaches.   Hematological: Negative.    Psychiatric/Behavioral: Negative.    All other systems reviewed and are negative.     PHYSICAL EXAM  Blood pressure 118/68, pulse 64, temperature 98.2 °F (36.8 °C), temperature source Oral, resp. rate 18, height 66\" (167.6 cm), weight 178 lb (80.7 kg), SpO2 97 %.    Constitutional: She is oriented to person, place, and time and well-developed, well-nourished, and in no distress. No distress.   Head: Normocephalic and atraumatic.   Eyes: EOM are normal. Pupils are equal, round, and reactive to light.   Neck: Normal range of motion. Neck supple.   Cardiovascular: Normal rate, regular rhythm and normal heart sounds.    Pulmonary/Chest: Effort normal and breath sounds normal. No respiratory distress. "   Abdominal: Soft. There is no tenderness. There is no rebound and no guarding.   Musculoskeletal: Normal range of motion. She exhibits no edema.   Neurological: She is alert and oriented to person, place, and time. She has normal sensation and normal strength.   Skin: Skin is warm and dry. No rash noted.   Psychiatric: Mood and affect normal.     LAB RESULTS  Lab Results (last 24 hours)     Procedure Component Value Units Date/Time    Basic Metabolic Panel [[id number]]  (Abnormal) Collected:  11/21/17 0440    Specimen:  Blood Updated:  11/21/17 0558     Glucose 94 mg/dL      BUN 16 mg/dL      Creatinine 0.47 (L) mg/dL      Sodium 143 mmol/L      Potassium 4.0 mmol/L      Chloride 106 mmol/L      CO2 26.8 mmol/L      Calcium 8.7 mg/dL      eGFR Non African Amer 137 mL/min/1.73      BUN/Creatinine Ratio 34.0 (H)     Anion Gap 10.2 mmol/L     Narrative:       GFR Normal >60  Chronic Kidney Disease <60  Kidney Failure <15        Imaging Results (last 24 hours)     ** No results found for the last 24 hours. **          Current Facility-Administered Medications:   •  ALPRAZolam (XANAX) tablet 0.25 mg, 0.25 mg, Oral, 4x Daily PRN, Yoav Madsen MD, 0.25 mg at 11/21/17 0819  •  amLODIPine (NORVASC) tablet 10 mg, 10 mg, Oral, Q24H, Yoav Madsen MD, 10 mg at 11/21/17 0819  •  baclofen (LIORESAL) tablet 10 mg, 10 mg, Oral, Q8H, Yoav Madsen MD, 10 mg at 11/21/17 0628  •  estradiol (ESTRACE) tablet 0.5 mg, 0.5 mg, Oral, Daily, Yoav Mdasen MD, 0.5 mg at 11/21/17 0819  •  gabapentin (NEURONTIN) capsule 300 mg, 300 mg, Oral, Q8H, Yoav Madsen MD, 300 mg at 11/21/17 0628  •  HYDROcodone-acetaminophen (NORCO) 5-325 MG per tablet 2 tablet, 2 tablet, Oral, Q4H PRN, Yoav Madsen MD, 2 tablet at 11/21/17 0935  •  ipratropium-albuterol (DUO-NEB) nebulizer solution 3 mL, 3 mL, Nebulization, Q4H - RT, Yoav Madsen MD  •  pantoprazole (PROTONIX) EC tablet 40 mg, 40 mg, Oral, QAM, Yoav Madsen MD, 40 mg at 11/21/17 0628  •  Insert peripheral  IV, , , Once **AND** sodium chloride 0.9 % flush 10 mL, 10 mL, Intravenous, PRN, Gavino Melvin MD  •  Insert peripheral IV, , , Once **AND** sodium chloride 0.9 % flush 10 mL, 10 mL, Intravenous, PRN, Gavino Melvin MD  •  traZODone (DESYREL) tablet 100 mg, 100 mg, Oral, Nightly, Danika Madsen MD, 100 mg at 11/20/17 9369     ASSESSMENT  No seizure disorder  Density left medial frontal lobe brain/small hemorrhage/Not visible in MRI brain  Right knee effusion with severe arthritis  COPD  Hypertension  Osteoarthritis  Tobacco abuse  Low back pain  Anxiety disorder  Depression  Chronic pain syndrome    PLAN  Discharge home  Discharge summary dictated    DANIKA MADSEN MD

## 2017-11-21 NOTE — PROGRESS NOTES
Neuro  EEG is normal.    I have no information to support he diagnosis of Seizure.    Plan  D/C Keppra.  Can not drive for 3 months to be safe.  No further neuro w/u now.  Will see prn.    MIGUEL Hsieh MD

## 2017-11-21 NOTE — PROGRESS NOTES
Continued Stay Note  [facility]     Patient Name: Seda Urbina  MRN: [id number]  Today's Date: 11/21/2017    Admit Date: 11/18/2017          Discharge Plan       11/21/17 1600    Case Management/Social Work Plan    Additional Comments Notified by Gemma/ Signature that precert was received for rehab at [place]. Pt notified and is in agreement.  DC orders noted.  Rx x2 copied to scan and placed in packet. DC summary and DC pkt given to RN.  Pt states her significant other will transport after he gets off work.      Final Note    Final Note Pt to be DC'd to skilled bed at [place].           Expected Discharge Date and Time     Expected Discharge Date Expected Discharge Time    Nov 21, 2017             Jennifer Villalobos RN

## 2017-11-21 NOTE — TELEPHONE ENCOUNTER
The patient already has an appointment to see me in the office on Wednesday this week at the [facility].  That would be the most appropriate time for her to follow up for the knee swelling and aspiration of the knee if  needed and discussion regarding further treatment options.  As well as discussion of her MRI reports

## 2017-11-21 NOTE — PLAN OF CARE
Problem: Patient Care Overview (Adult)  Goal: Plan of Care Review  Outcome: Ongoing (interventions implemented as appropriate)    11/21/17 1140   Coping/Psychosocial Response Interventions   Plan Of Care Reviewed With patient   Patient Care Overview   Progress progress towards functional goals is fair   Outcome Evaluation   Outcome Summary/Follow up Plan pt able to tolerate some weight through RLE today and walk with antalgic gait with walker, with SBA. She has appointment with Dr Almaraz tomorrow and will be d/c'd from hospital today. She needs rwx and I spoke to bogdan planner about this. Pt safe to go home with walker to get around her mobile home, and she will receive follow up from her ortho MD about progressing with R knee activity.

## 2017-11-21 NOTE — PLAN OF CARE
Problem: Patient Care Overview (Adult)  Goal: Plan of Care Review  Outcome: Ongoing (interventions implemented as appropriate)    11/21/17 1232   Coping/Psychosocial Response Interventions   Plan Of Care Reviewed With patient   Patient Care Overview   Progress progress toward functional goals as expected   Outcome Evaluation   Outcome Summary/Follow up Plan Vss,possible D/C today.       Goal: Adult Individualization and Mutuality  Outcome: Ongoing (interventions implemented as appropriate)    Problem: Fall Risk (Adult)  Goal: Absence of Falls  Outcome: Ongoing (interventions implemented as appropriate)    Problem: Seizure Disorder/Epilepsy (Adult)  Goal: Signs and Symptoms of Listed Potential Problems Will be Absent or Manageable (Seizure Disorder/Epilepsy)  Outcome: Ongoing (interventions implemented as appropriate)    Problem: Skin Integrity Impairment, Risk/Actual (Adult)  Goal: Identify Related Risk Factors and Signs and Symptoms  Outcome: Outcome(s) achieved Date Met:  11/21/17  Goal: Skin Integrity/Wound Healing  Outcome: Ongoing (interventions implemented as appropriate)

## 2017-11-21 NOTE — THERAPY TREATMENT NOTE
Acute Care - Physical Therapy Treatment Note  [facility]     Patient Name: Seda Urbina  : 1960  MRN: [id number]  Today's Date: 2017  Onset of Illness/Injury or Date of Surgery Date: 17  Date of Referral to PT: 17  Referring Physician: Cale    Admit Date: 2017    Visit Dx:    ICD-10-CM ICD-9-CM   1. Seizure-like activity R56.9 780.39   2. Chronic pain of right knee M25.561 719.46    G89.29 338.29   3. Abnormal CT scan, head R93.0 793.0     Patient Active Problem List   Diagnosis   • Knee pain, bilateral   • Primary osteoarthritis of right knee   • Status post total knee replacement, left   • Tear of deltoid ligament of ankle   • Dislocation of right knee   • Acute pain of right knee   • Seizure-like activity               Adult Rehabilitation Note       17 1134          Rehab Assessment/Intervention    Discipline physical therapist  -PC      Document Type therapy note (daily note)  -PC      Subjective Information agree to therapy  -PC      Patient Effort, Rehab Treatment good  -PC      Symptoms Noted During/After Treatment none  -PC      Recorded by [PC] Rachell Sloan, PT      Pain Assessment    Pain Assessment FLACC  -PC      Pain Score 6  -PC      Pain Location Knee  -PC      Pain Orientation Right  -PC      Pain Intervention(s) Repositioned  -PC      Recorded by [PC] Rachell Sloan, PT      Cognitive Assessment/Intervention    Current Cognitive/Communication Assessment functional  -PC      Orientation Status oriented x 4  -PC      Recorded by [PC] Rachell Sloan, PT      ROM (Range of Motion)    General ROM Detail R knee 15-85  -PC      Recorded by [PC] Rachell Sloan, PT      Bed Mobility, Assessment/Treatment    Bed Mobility, Scoot/Bridge, [place] independent  -PC      Bed Mob, Supine to Sit, [place] independent  -PC      Recorded by [PC] Rachell Sloan, PT      Transfer Assessment/Treatment    Transfers, Sit-Stand [place] independent  -PC       Transfers, Stand-Sit [place] independent  -PC      Recorded by [PC] Rachell Sloan PT      Gait Assessment/Treatment    Gait, [place] Level stand by assist  -PC      Gait, Assistive Device rolling walker  -PC      Gait, Distance (Feet) 40  -PC      Gait, Gait Deviations antalgic  -PC      Gait, Impairments pain  -PC      Gait, Comment antalgic gait, pt able to tolerate some weight on  RLE, putting R forefoot down for balance. pt encouraged to wear brace RLE (it is at home)  -PC      Recorded by [PC] Rachell Sloan PT      Positioning and Restraints    Pre-Treatment Position in bed  -PC      Post Treatment Position bed  -PC      In Bed supine;call light within reach;encouraged to call for assist  -PC      Recorded by [PC] Rachell Sloan PT        User Key  (r) = Recorded By, (t) = Taken By, (c) = Cosigned By    Initials Name Effective Dates    PC Rachell Sloan PT 12/01/15 -                 IP PT Goals       11/20/17 1040          Transfer Training PT LTG    Transfer Training PT LTG, Date Established 11/20/17  -PC      Transfer Training PT LTG, Time to Achieve 1 wk  -PC      Transfer Training PT LTG, Activity Type sit to stand/stand to sit  -PC      Transfer Training PT LTG, [place] Level conditional independence  -PC      Gait Training PT LTG    Gait Training Goal PT LTG, Date Established 11/20/17  -PC      Gait Training Goal PT LTG, Time to Achieve 1 wk  -PC      Gait Training Goal PT LTG, [place] Level conditional independence  -PC      Gait Training Goal PT LTG, Assist Device walker, rolling  -PC      Gait Training Goal PT LTG, Distance to Achieve 25 ft  -PC        User Key  (r) = Recorded By, (t) = Taken By, (c) = Cosigned By    Initials Name Provider Type    PC Rachell Sloan PT Physical Therapist          Physical Therapy Education     Title: PT OT SLP Therapies (Active)     Topic: Physical Therapy (Active)     Point: Mobility training (Done)    Learning Progress Summary    Learner  Readiness Method Response Comment Documented by Status   Patient Acceptance EMIGUEL,VU  PC 11/21/17 1140 Done    Acceptance E,D NR  PC 11/20/17 1040 Active    Acceptance E VU  MA 11/19/17 1133 Done               Point: Body mechanics (Done)    Learning Progress Summary    Learner Readiness Method Response Comment Documented by Status   Patient Acceptance MIGUEL MASCORRO,VU  PC 11/21/17 1140 Done    Acceptance E,D NR  PC 11/20/17 1040 Active               Point: Precautions (Done)    Learning Progress Summary    Learner Readiness Method Response Comment Documented by Status   Patient Acceptance EMIGUEL,VU  PC 11/21/17 1140 Done    Acceptance E,D NR  PC 11/20/17 1040 Active                      User Key     Initials Effective Dates Name Provider Type Discipline     12/01/15 -  Rachell Sloan, PT Physical Therapist PT    MA 12/13/16 -  Lisa Gandhi, PT Physical Therapist PT                    PT Recommendation and Plan  Anticipated Equipment Needs At Discharge: front wheeled walker (brace R knee)  Anticipated Discharge Disposition: home with assist  PT Frequency: daily  Plan of Care Review  Plan Of Care Reviewed With: patient  Progress: progress towards functional goals is fair  Outcome Summary/Follow up Plan: pt able to tolerate some weight through RLE today and walk with antalgic gait with walker, with SBA.  She has appointment with Dr Almaraz tomorrow and will be d/c'd from hospital today.  She needs rwx and I spoke to bogdan planner about this.  Pt safe to go home with walker to get around her mobile home, and she will receive follow up from her ortho MD about progressign with R knee activity.          Outcome Measures       11/21/17 1100 11/20/17 1000 11/19/17 1100    How much help from another person do you currently need...    Turning from your back to your side while in flat bed without using bedrails? 4  -PC 4  -PC 4  -MA    Moving from lying on back to sitting on the side of a flat bed without bedrails? 4  -PC 4  -PC  4  -MA    Moving to and from a bed to a chair (including a wheelchair)? 4  -PC 4  -PC 4  -MA    Standing up from a chair using your arms (e.g., wheelchair, bedside chair)? 4  -PC 3  -PC 4  -MA    Climbing 3-5 steps with a railing? 2  -PC 2  -PC 1  -MA    To walk in hospital room? 3  -PC 3  -PC 1  -MA    AM-PAC 6 Clicks Score 21  -PC 20  -PC 18  -MA    Functional Assessment    Outcome Measure Options   AM-PAC 6 Clicks Basic Mobility (PT)  -MA      User Key  (r) = Recorded By, (t) = Taken By, (c) = Cosigned By    Initials Name Provider Type    PC Rachell Sloan, PT Physical Therapist    GLADYS Gandhi, PT Physical Therapist           Time Calculation:         PT Charges       11/21/17 1146          Time Calculation    Start Time 1111  -PC      Stop Time 1127  -PC      Time Calculation (min) 16 min  -PC      PT Received On 11/21/17  -PC      PT - Next Appointment 11/22/17  -PC        User Key  (r) = Recorded By, (t) = Taken By, (c) = Cosigned By    Initials Name Provider Type    PC Rachell Sloan, PT Physical Therapist          Therapy Charges for Today     Code Description Service Date Service Provider Modifiers Qty    05815925917 HC PT EVAL LOW COMPLEXITY 2 11/20/2017 Rachell Sloan, PT GP 1    85489618530 HC PT THER PROC EA 15 MIN 11/20/2017 Rachell Sloan PT GP 1    23610446085 HC PT THER PROC EA 15 MIN 11/21/2017 Rachell Sloan, PT GP 1          PT G-Codes  PT Professional Judgement Used?: Yes  Outcome Measure Options: AM-PAC 6 Clicks Basic Mobility (PT)  Functional Limitation: Mobility: Walking and moving around  Mobility: Walking and Moving Around Current Status (): At least 60 percent but less than 80 percent impaired, limited or restricted  Mobility: Walking and Moving Around Goal Status (): 0 percent impaired, limited or restricted  Mobility: Walking and Moving Around Discharge Status (): 0 percent impaired, limited or restricted    Rachell Sloan PT  11/21/2017

## 2017-11-22 ENCOUNTER — OFFICE VISIT (OUTPATIENT)
Dept: ORTHOPEDIC SURGERY | Facility: CLINIC | Age: 57
End: 2017-11-22

## 2017-11-22 DIAGNOSIS — M17.11 PRIMARY OSTEOARTHRITIS OF RIGHT KNEE: ICD-10-CM

## 2017-11-22 DIAGNOSIS — G89.29 CHRONIC PAIN OF RIGHT KNEE: Primary | ICD-10-CM

## 2017-11-22 DIAGNOSIS — M25.561 CHRONIC PAIN OF RIGHT KNEE: Primary | ICD-10-CM

## 2017-11-22 PROCEDURE — 20610 DRAIN/INJ JOINT/BURSA W/O US: CPT | Performed by: ORTHOPAEDIC SURGERY

## 2017-11-22 PROCEDURE — 99213 OFFICE O/P EST LOW 20 MIN: CPT | Performed by: ORTHOPAEDIC SURGERY

## 2017-11-22 RX ORDER — HYDROCODONE BITARTRATE AND ACETAMINOPHEN 5; 325 MG/1; MG/1
TABLET ORAL
Qty: 30 TABLET | Refills: 0 | Status: SHIPPED | OUTPATIENT
Start: 2017-11-22 | End: 2018-03-14 | Stop reason: SDUPTHER

## 2017-11-22 RX ADMIN — METHYLPREDNISOLONE ACETATE 160 MG: 80 INJECTION, SUSPENSION INTRA-ARTICULAR; INTRALESIONAL; INTRAMUSCULAR; SOFT TISSUE at 09:41

## 2017-11-22 RX ADMIN — LIDOCAINE HYDROCHLORIDE 2 ML: 10 INJECTION, SOLUTION INFILTRATION; PERINEURAL at 09:41

## 2017-11-22 NOTE — PROGRESS NOTES
Chief Complaint   Patient presents with   • Right Knee - Follow-up           HPI the patient is here today for right knee follow up. Patient continues to have pain and discomfort on the right knee. In fact, she states that she had so much pain medication that she ran out of the narcotics that were given to her PCP that she had to check into the emergency room. She was admitted to the hospital and the hospitalist discharged her with a diagnosis of chronic knee pain with osteoarthritis. She also had an MRI which showed that she does have a medial meniscus tear with a bucket-type of component which is displaced into the joint. I have discussed with the patient that she is going to need a arthroscopic procedure to debride this meniscal tear and to help her with the symptoms of the knee popping and catching. Unfortunately, this patient has a very strong narcotic medication seeking behavior and she really needs to check into pan management clinic so that they can help her to wean her off the dependence on narcotics. I am not able to successfully get her to understand the implications and the gravity of the knee surgery because of her fixation on obtaining narcotics from different providers. At this point therefore in my clinical judgement she is not a good candidate for a knee arthroscopy even though her clinical problem can be solved with an arthroscopic surgery. Unfortunately, it would be very difficult for the patient to participate in a physical therapy and rehabilitation program given the extent of fixation with her demand for narcotic medication. We have discussed this very freely, frankly and openly in the office in the presence of her significant other. She recently was diagnosed with seizure disorder and for that purpose she was hospitalized. She is now on anticonvulsant medication. This is another red flag for which I cannot prescribe her any narcotics given the fact that interactions between the anticonvulsants  and the narcotic medication can cause potential and severe side-effects. She states that she has sought an appointment with [facility], pain clinic and my recommendations are that I will be able to give her only 1 prescription of    [place] and no further prescriptions which are to be obtained from [facility].                  There were no vitals filed for this visit.        Review of Systems   Constitutional: Negative.    HENT: Negative.    Eyes: Negative.    Respiratory: Negative.    Cardiovascular: Negative.    Gastrointestinal: Negative.    Endocrine: Negative.    Genitourinary: Negative.    Musculoskeletal: Positive for gait problem and joint swelling.   Skin: Negative.    Allergic/Immunologic: Negative.    Hematological: Negative.            Physical Exam   Constitutional: She is oriented to person, place, and time. She appears well-nourished.   HENT:   Head: Atraumatic.   Eyes: EOM are normal.   Neck: Neck supple.   Cardiovascular: Normal rate, regular rhythm, normal heart sounds and intact distal pulses.    Pulmonary/Chest: Effort normal and breath sounds normal.   Abdominal: Soft. Bowel sounds are normal.   Musculoskeletal: She exhibits edema and tenderness.   Neurological: She is alert and oriented to person, place, and time. She has normal reflexes.   Skin: Skin is dry.   Psychiatric: She has a normal mood and affect. Her behavior is normal. Judgment and thought content normal.   Nursing note and vitals reviewed.          Joint/Body Part Specific Exam:    right knee. Patient has crepitus throughout range of motion. Positive patellar grind test. Mild effusion. Lachman is negative. Pivot shift is negative. Anterior and posterior drawer signs are negative. Significant joint line tenderness is noted on the medial aspect of the knee. Patient has a varus orientation of the knee. Range of motion in flexion is for 0- 110 degrees. Neurovascular status intact.  Dorsalis pedis and  posterior tibial artery pulses are palpable. Common peroneal nerve function is well preserved. Patients gait is cautious and antalgic. Skin and soft tissues are swollen; consistent with synovitis and effusion. She has pain and a limp when she first gets up to walk from a seated position.    X-RAY Report:        Diagnostics:  MRI report  And films shows intact ACL and PCL. She does have a large complex medial meniscal tear with a bucket-handle component with some displacement of the joint. This fragment does extend obliquely across the medial compartment. There is also the possibility of a small lateral meniscus tear. There are irregular chondral loss and changes of all 3 compartments with full thickness chondral loss along the weightbearing surface of the medial femoral condyle. There is no evidence of a fracture. Very mild effusion is noted. Reports of this MRI are discussed with the patient in great and full detail.          Seda was seen today for follow-up.    Diagnoses and all orders for this visit:    Chronic pain of right knee  -     Large Joint Arthrocentesis    Primary osteoarthritis of right knee    Other orders  -     HYDROcodone-acetaminophen (NORCO) 5-325 MG per tablet; Take one tablet by mouth every 6-8 hours prn pain          Large Joint Arthrocentesis  Date/Time: 11/22/2017 9:41 AM  Consent given by: patient  Site marked: site marked  Timeout: Immediately prior to procedure a time out was called to verify the correct patient, procedure, equipment, support staff and site/side marked as required   Supporting Documentation  Indications: pain   Procedure Details  Location: knee - R knee  Preparation: Patient was prepped and draped in the usual sterile fashion  Needle size: 25 G  Approach: anteromedial  Medications administered: 2 mL lidocaine 1 %; 160 mg methylPREDNISolone acetate 80 MG/ML  Patient tolerance: patient tolerated the procedure well with no immediate complications              Plan:  Use a  supportive brace to the knee.    Tablets of Ibuprofen 600 mg p.o. b.i.d. for pain and discomfort.    The patient is going to see her neurologist so that her seizure medicine can be adjusted and she can be safely seizure free.    The patient will get an appointment with [facility] and will try to wean off her narcotic medication.    Tablets of Norco 5/325 mg tablet, take 1 p.o. q.6-8 h. p.r.n. pain, total of 30 pills with no refills. She was asking me for 10 mg of Norco, which I am absolutely unable to prescribe to her. We had an extensive discussion regarding her pain seeking behavior.    She may be a candidate for a knee arthroscopy in the future and I have discussed that with her.    She does not want a knee replacement at any cost because she had a very poor outcome with her left total knee replacement that was performed by Dr. Butt who has since left [facility].    Intraarticular sterid injection discussed with the patient and administered to her today in the office.    The risks and benefits of the steroid injection discussed with the patient in great detail.    Follow up in my office in about 6 weeks once the seizure disorder has been stabilized and her pain medication situation is a little more stable and then we can talk about surgical intervention in the form of an arthroscopy.    The risks and benefits of arthroscopic surgery discussed with the patient in great detail.    Controlled substance treatment options discussed in detail. Patient's signed consent to medical options on file. MILES form in chart. Risks of narcotic medication usage outlined.  Possibility of physical and psychological dependence and abuse, especially long term, emphasized to the patient.  I have explained to the patient that we will try to wean them off the narcotic medication as soon as possible and introduce non-narcotic modalities of pain control.  I have also discussed the possibility of random drug testing as well  as pill counts to prevent misuse and misappropriation of the narcotic medications prescribed to the patient.

## 2017-11-22 NOTE — PROGRESS NOTES
Case Management Discharge Note    Final Note: Pt to be DC'd to skilled bed at [facility].     Discharge Placement     Facility/Agency Request Status Selected? Address Phone Number Fax Number    [facility] Accepted    Yes [address], [facility] [address] [phone]        Jennifer Villalobos, DANIEL 11/20/2017 15:32    Referral to [facility] Accepted     [address], [facility] [id number] [phone]        Jennifer Villalobos RN 11/20/2017 14:44    [facility] notified.               [facility] Pending - Request Sent     [address], [facility] [address] [phone]        Jennifer Villalobos RN 11/20/2017 15:32    Referral to [facility] Declined     [address], [facility] [address] [phone]        Jennifer Villalobos RN 11/20/2017 15:06    [place] [facility]  No beds available.                        Discharge Codes: 03  Discharged/transferred to skilled nursing facility (SNF) with Medicare certification in anticipation of skilled care ([facility])

## 2017-11-25 RX ORDER — HYDROMORPHONE HCL 110MG/55ML
PATIENT CONTROLLED ANALGESIA SYRINGE INTRAVENOUS
Status: DISPENSED
Start: 2017-11-25 | End: 2017-11-26

## 2017-11-29 ENCOUNTER — TELEPHONE (OUTPATIENT)
Dept: ORTHOPEDIC SURGERY | Facility: CLINIC | Age: 57
End: 2017-11-29

## 2017-11-29 RX ORDER — METHYLPREDNISOLONE ACETATE 80 MG/ML
160 INJECTION, SUSPENSION INTRA-ARTICULAR; INTRALESIONAL; INTRAMUSCULAR; SOFT TISSUE
Status: COMPLETED | OUTPATIENT
Start: 2017-11-22 | End: 2017-11-22

## 2017-11-29 RX ORDER — LIDOCAINE HYDROCHLORIDE 10 MG/ML
2 INJECTION, SOLUTION INFILTRATION; PERINEURAL
Status: COMPLETED | OUTPATIENT
Start: 2017-11-22 | End: 2017-11-22

## 2018-02-14 ENCOUNTER — OFFICE VISIT (OUTPATIENT)
Dept: ORTHOPEDIC SURGERY | Facility: CLINIC | Age: 58
End: 2018-02-14

## 2018-02-14 VITALS — TEMPERATURE: 99.2 F | BODY MASS INDEX: 21.69 KG/M2 | WEIGHT: 135 LBS | HEIGHT: 66 IN

## 2018-02-14 DIAGNOSIS — S83.241D ACUTE MEDIAL MENISCUS TEAR OF RIGHT KNEE, SUBSEQUENT ENCOUNTER: ICD-10-CM

## 2018-02-14 DIAGNOSIS — Z96.652 HISTORY OF TOTAL KNEE ARTHROPLASTY, LEFT: Primary | ICD-10-CM

## 2018-02-14 PROBLEM — S83.241A ACUTE MEDIAL MENISCUS TEAR OF RIGHT KNEE: Status: ACTIVE | Noted: 2018-02-14

## 2018-02-14 PROCEDURE — 99214 OFFICE O/P EST MOD 30 MIN: CPT | Performed by: ORTHOPAEDIC SURGERY

## 2018-02-14 PROCEDURE — 73560 X-RAY EXAM OF KNEE 1 OR 2: CPT | Performed by: ORTHOPAEDIC SURGERY

## 2018-02-14 RX ORDER — CLINDAMYCIN PHOSPHATE 900 MG/50ML
900 INJECTION INTRAVENOUS ONCE
Status: CANCELLED | OUTPATIENT
Start: 2018-06-01 | End: 2018-06-01

## 2018-02-14 NOTE — PROGRESS NOTES
Chief Complaint   Patient presents with   • Right Knee - Follow-up, Pain       HPI patient states that she has been having him miserable time with a right knee.  The knee locks, matt and gives out from underneath her.  She has had at least 2 major falls since she last saw me.  Have a displaced bucket handle tear of the medial meniscus and her clinical findings definitely makes sense at this point.  She is absolutely against a knee arthroplasty because she had a terrible outcome on her left side.  She initially had a surgical replacement of the left knee performed by Dr. Bee.  That surgery failed and had to be revised by .  Unfortunately the first surgeon has passed when the second and has left town and the patient is left with a knee that does not function well.  She also had a periprosthetic distal femoral fracture which had to be repaired with internal fixation.  She states that she still goes through the 4 of the pain and discomfort on the lateral side of her left knee and therefore is absolutely against a right knee arthroplasty.  She does want something done because she has struggled with this meniscal tear for at least 6 months.  She would want to proceed with an arthroscopic surgery with a full understanding that she does have underlying arthritis of the knee as outlined by the MRI report.  She also understands that an arthroscopic procedure cannot fully address the issues with arthritis of the knee.  Eventually, she is going to need a total knee replacement on the right side.  I have spent a lot of time with the patient and her significant other today explaining to them the risks and benefits of arthroscopic surgery versus arthroplasty surgery in this setting.  I have also made it very clear that I would not be able to prescribe her any narcotics whatsoever since she has a relationship with the pain clinic and she needs to do all that she can do up hold that relationship so that she can get her  prescriptions as needed for controlling her pain and symptoms.        Vitals:    02/14/18 1452   Temp: 99.2 °F (37.3 °C)           Review of Systems   Constitutional: Negative.    HENT: Negative.    Eyes: Negative.    Respiratory: Negative.    Cardiovascular: Negative.    Gastrointestinal: Negative.    Endocrine: Negative.    Genitourinary: Negative.    Musculoskeletal: Positive for arthralgias, gait problem and joint swelling.   Skin: Negative.    Allergic/Immunologic: Negative.    Hematological: Negative.    Psychiatric/Behavioral: Negative.            Physical Exam   Constitutional: She is oriented to person, place, and time. She appears well-nourished.   HENT:   Head: Atraumatic.   Eyes: EOM are normal.   Neck: Neck supple.   Cardiovascular: Normal rate, regular rhythm, normal heart sounds and intact distal pulses.    Pulmonary/Chest: Effort normal and breath sounds normal.   Abdominal: Bowel sounds are normal.   Musculoskeletal: She exhibits edema and tenderness. She exhibits no deformity.   Neurological: She is alert and oriented to person, place, and time. She has normal reflexes.   Skin: Skin is dry.   Psychiatric: She has a normal mood and affect. Her behavior is normal. Judgment and thought content normal.   Nursing note and vitals reviewed.          Joint/Body Part Specific Exam:Right knee:  The knee joint shows effusion with some thickening of the synovial membrane. There is tenderness over the meniscus. Apley’s grinding test is positive. Marie’s sign is positive. There is a distinct click in mid flexion. Range of motion is from 0-110°. No instability on medial or lateral testing. Anterior and posterior drawer testing is negative. Lachman test is negative. Joint line tenderness is present to direct palpation. There is some tenderness over the medial face of the tibia just distal to the joint line. The dorsalis pedis and posterior tibial artery pulses are palpable. Common peroneal nerve function is  well preserved. Gait is cautious and somewhat antalgic. Full extension causes the patient to have quite a bit of pain and discomfort.      left knee.The patient is status post total knee arthroplasty postoperative several year(s). Incision is clean. Calf is soft and nontender. Homans sign is negative. There is no clicking, popping or catching. Anterior and posterior drawer signs are negative.  There is no instability of the components. Appropriate amounts of swelling and bruising are noted. Dorsalis pedis and posterior tibial artery pulses are palpable. Common peroneal nerve function is well preserved. Range of motion is from 0- 90° degrees of flexion. Gait is cautious but otherwise fairly normal. There is no evidence of a deep seated joint infection.  X-RAY Report:  Leftt Knee X-Ray  Indication: Pain and locking of the knee.  Failed knee arthroplasty with periprosthetic femoral fracture  AP, Lateral views  Findings: The femur fracture is completely healed.  The implants are in good position.  no bony lesion  Soft tissues within normal limits  within normal limits joint spaces  Hardware appropriately positioned yes      yes prior studies available for comparison.    X-RAY was ordered and reviewed by Meño Almaraz MD      Diagnostics:  MRI report from [facility] shows that the patient has a degenerative medial meniscus tear which is large and complex.  It has a bucket-handle component.  It is displaced into the joint space.  There is some irregular cartilage loss over all 3 compartments of the knee with some full thickness chondral loss over the medial femoral condyle.  There is also mild effusion.  There is a possibility of a small lateral meniscus tear as well.  These images are discussed with the patient at length.      Seda was seen today for follow-up and pain.    Diagnoses and all orders for this visit:    History of total knee arthroplasty, left  -     XR Knee 1 or 2 View Left    Acute medial meniscus  tear of right knee, subsequent encounter            Procedures        Plan:  Stretching and strengthening exercises of the right knee including the quads and the hamstrings.    Falls precaution.    There is no indication for revision surgery on the left side at this point.    Patient needs to continue her relationship to the pain clinic so that she can obtain her medication from that clinic.    I will not be able to prescribe her with any narcotic medication and I do believe she understands that.    Patient wants to proceed with an arthroscopic surgery of the right knee despite the fact that she does have tricompartmental osteoarthritis.  She wants to have the bucket handle tear of the medial meniscus addressed since she feels that the locking and her falling is coming from the mechanical symptoms of the meniscal tear rather than the arthritic symptoms.  She is also had a very terrible outcome from a left knee arthroplasty where she had to have revision surgery and then sustained a distal femoral fracture which had to be repaired with internal fixation.  She does not want to freely through the outer of going through the surgical replacement of the left knee at this point.    Risks and benefits of arthroscopic surgery discussed with the patient and her significant other.    Time spent in the office today discussing the patient's MRI, going over the rationale for making the surgical decision and to actually scheduled her surgery is 30 minutes.  Greater than 50% of my time was spent counseling this patient with regards to the treatment options and the potential complications and the fact that she might eventually still need a total knee replacement after the arthroscopic meniscectomy.

## 2018-02-19 ENCOUNTER — TELEPHONE (OUTPATIENT)
Dept: ORTHOPEDIC SURGERY | Facility: CLINIC | Age: 58
End: 2018-02-19

## 2018-02-19 NOTE — TELEPHONE ENCOUNTER
PATIENT CALLED AND IS WANTING TO SCHEDULE HER SURGERY ON A Friday THE FIRST PART OF MARCH PLEASE, PREFERABLY THE 9TH

## 2018-02-21 ENCOUNTER — TELEPHONE (OUTPATIENT)
Dept: ORTHOPEDIC SURGERY | Facility: CLINIC | Age: 58
End: 2018-02-21

## 2018-02-21 NOTE — TELEPHONE ENCOUNTER
Can you please call the patient and talked to her regarding this upcoming knee arthroscopy?  Plan is for knee arthroscopy only no replacement.  Partial meniscectomy would be the proposed procedure.  I schedule that at [place] because it is a lot easier to perform surgeries in the outpatient setting.  Than it is at the main hospital at [facility].  And no I cannot prescribe any pain medication for this patient because she is attached to a pain clinic.  An thank you so much

## 2018-02-21 NOTE — TELEPHONE ENCOUNTER
Call return to the patient.  Explained to her that Dr. Almaraz would prefer to do pretty his outpatient surgeries at [facility] however I could check with him to see if doing him here in [place] would be okay.  Initially her reasoning for having it done here in [place] was because of financial reasons and not having the money to pay for the Gas to [place] [facility].  Patient looked over her calendar and decided that she could do it in [place] after March 9.  Will notify Dr. Almaraz surgery scheduler.  We also discussed her pain medicine.  She apparently is no longer seeing that pain management physician and is in the process of being referred to [facility] pain management.  Vies her that our office policy generally does not recommend long-term pain management and that we deal normally provide narcotics prior to surgery.  Dr. Almaraz would be able to prescribe postop pain medicine for short period of time.  Patient understands and agrees

## 2018-02-26 ENCOUNTER — TELEPHONE (OUTPATIENT)
Dept: ORTHOPEDIC SURGERY | Facility: CLINIC | Age: 58
End: 2018-02-26

## 2018-02-26 NOTE — TELEPHONE ENCOUNTER
I am not able to prescribe her any narcotics at all.  She needs to continue her relationship with the pain clinic.  [place] state laws do not allow me to prescribe her any narcotics whatsoever.  I am sorry but I am not able to help her in this regard.  She can try her PCP if she is not going to see the pain clinic physicians.  I can give her 1 prescription of Norco after surgery.  1 prescription only.  I am sorry I'm not able to help her anymore than this.

## 2018-02-26 NOTE — TELEPHONE ENCOUNTER
Patient called asking about pain medication, she states she's no longer going to the pain clinic. The most recent narcotic was given on 1/22/18 [facility]

## 2018-02-27 NOTE — TELEPHONE ENCOUNTER
Patient was advised no pain medication will be given before surgery and only 1 script will be given after surgery she will have to follow up with her pain clinic for additional prescriptions. I also advised her about the scheduling of her knee scope @ [facility] for March 9 she's aware that they will call her the day before about what time to be there.

## 2018-03-09 ENCOUNTER — TELEPHONE (OUTPATIENT)
Dept: ORTHOPEDIC SURGERY | Facility: CLINIC | Age: 58
End: 2018-03-09

## 2018-03-09 ENCOUNTER — OUTSIDE FACILITY SERVICE (OUTPATIENT)
Dept: ORTHOPEDIC SURGERY | Facility: CLINIC | Age: 58
End: 2018-03-09

## 2018-03-09 PROCEDURE — 29881 ARTHRS KNE SRG MNISECTMY M/L: CPT | Performed by: ORTHOPAEDIC SURGERY

## 2018-03-14 ENCOUNTER — OFFICE VISIT (OUTPATIENT)
Dept: ORTHOPEDIC SURGERY | Facility: CLINIC | Age: 58
End: 2018-03-14

## 2018-03-14 VITALS — WEIGHT: 179 LBS | BODY MASS INDEX: 28.77 KG/M2 | HEIGHT: 66 IN | TEMPERATURE: 98.1 F

## 2018-03-14 DIAGNOSIS — S83.241D ACUTE MEDIAL MENISCUS TEAR OF RIGHT KNEE, SUBSEQUENT ENCOUNTER: Primary | ICD-10-CM

## 2018-03-14 PROCEDURE — 99024 POSTOP FOLLOW-UP VISIT: CPT | Performed by: ORTHOPAEDIC SURGERY

## 2018-03-14 RX ORDER — HYDROCODONE BITARTRATE AND ACETAMINOPHEN 5; 325 MG/1; MG/1
TABLET ORAL
Qty: 30 TABLET | Refills: 0 | Status: SHIPPED | OUTPATIENT
Start: 2018-03-14 | End: 2018-06-27 | Stop reason: SDUPTHER
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
## 2018-03-16 ENCOUNTER — TELEPHONE (OUTPATIENT)
Dept: ORTHOPEDIC SURGERY | Facility: CLINIC | Age: 58
End: 2018-03-16

## 2018-03-16 NOTE — TELEPHONE ENCOUNTER
That is the correct advice. ABSOLUTELY NO MORE NARCOTICS FOR THIS PATIENT FROM MY OFFICE  sHE NEEDS TO HONOR HER COMITTMENT AND KEEP APPOINTMENT WITH [facility] PAIN [facility]EMENT

## 2018-05-30 ENCOUNTER — OFFICE VISIT (OUTPATIENT)
Dept: ORTHOPEDIC SURGERY | Facility: CLINIC | Age: 58
End: 2018-05-30

## 2018-05-30 VITALS — BODY MASS INDEX: 27.61 KG/M2 | HEIGHT: 66 IN | TEMPERATURE: 98.2 F | WEIGHT: 171.8 LBS

## 2018-05-30 DIAGNOSIS — M25.571 ACUTE RIGHT ANKLE PAIN: Primary | ICD-10-CM

## 2018-05-30 DIAGNOSIS — S83.241D ACUTE MEDIAL MENISCUS TEAR OF RIGHT KNEE, SUBSEQUENT ENCOUNTER: ICD-10-CM

## 2018-05-30 PROCEDURE — 73600 X-RAY EXAM OF ANKLE: CPT | Performed by: ORTHOPAEDIC SURGERY

## 2018-05-30 PROCEDURE — 99213 OFFICE O/P EST LOW 20 MIN: CPT | Performed by: ORTHOPAEDIC SURGERY

## 2018-05-30 PROCEDURE — 73560 X-RAY EXAM OF KNEE 1 OR 2: CPT | Performed by: ORTHOPAEDIC SURGERY

## 2018-05-30 RX ORDER — OXYBUTYNIN CHLORIDE 15 MG/1
15 TABLET, EXTENDED RELEASE ORAL DAILY
Refills: 1 | COMMUNITY
Start: 2018-04-13

## 2018-05-30 RX ORDER — LOSARTAN POTASSIUM AND HYDROCHLOROTHIAZIDE 25; 100 MG/1; MG/1
1 TABLET ORAL DAILY
COMMUNITY
End: 2019-12-17

## 2018-06-05 ENCOUNTER — TELEPHONE (OUTPATIENT)
Dept: ORTHOPEDIC SURGERY | Facility: CLINIC | Age: 58
End: 2018-06-05

## 2018-06-05 DIAGNOSIS — Z98.890 S/P KNEE SURGERY: ICD-10-CM

## 2018-06-05 DIAGNOSIS — S83.104A DISLOCATION OF RIGHT KNEE, INITIAL ENCOUNTER: ICD-10-CM

## 2018-06-05 DIAGNOSIS — M17.11 PRIMARY OSTEOARTHRITIS OF RIGHT KNEE: ICD-10-CM

## 2018-06-05 DIAGNOSIS — S83.241D ACUTE MEDIAL MENISCUS TEAR OF RIGHT KNEE, SUBSEQUENT ENCOUNTER: Primary | ICD-10-CM

## 2018-06-08 ENCOUNTER — HOSPITAL ENCOUNTER (OUTPATIENT)
Dept: CT IMAGING | Facility: HOSPITAL | Age: 58
End: 2018-06-08
Attending: ORTHOPAEDIC SURGERY

## 2018-06-12 ENCOUNTER — TELEPHONE (OUTPATIENT)
Dept: ORTHOPEDIC SURGERY | Facility: CLINIC | Age: 58
End: 2018-06-12

## 2018-06-12 NOTE — TELEPHONE ENCOUNTER
Patient was informed that Dr. Almaraz cannot give her any narcotics. She was told she needs to have CT/MRI done first/rj
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
## 2018-06-12 NOTE — TELEPHONE ENCOUNTER
She should get her CT arthrogram/MRI arthrogram which has been scheduled first.  Then I can discuss with her about further interventions.

## 2018-06-12 NOTE — TELEPHONE ENCOUNTER
Patient called this morning and states that she cannot take the pain anymore. She wants to know if you will call her and discuss replacing her right knee. She would like to be called at [phone].

## 2018-06-13 NOTE — TELEPHONE ENCOUNTER
That is right.  I have had a very clear discussion and understanding with this patient at her last visit that I cannot support her need for narcotics.  She most certainly needs to be attached to a pain clinic or discussed with her PCP regarding pain medication.  I am unfortunately, not able to support this aspect of her care.  I will be glad to see her after the CT/MRI and proceed with further recommendations for treatment for her knee problem.

## 2018-06-20 ENCOUNTER — HOSPITAL ENCOUNTER (OUTPATIENT)
Dept: GENERAL RADIOLOGY | Facility: HOSPITAL | Age: 58
Discharge: HOME | End: 2018-06-20
Attending: ORTHOPAEDIC SURGERY | Admitting: ORTHOPAEDIC SURGERY

## 2018-06-20 ENCOUNTER — HOSPITAL ENCOUNTER (OUTPATIENT)
Dept: MRI IMAGING | Facility: HOSPITAL | Age: 58
Discharge: HOME | End: 2018-06-20
Attending: ORTHOPAEDIC SURGERY

## 2018-06-20 DIAGNOSIS — Z98.890 S/P KNEE SURGERY: ICD-10-CM

## 2018-06-20 DIAGNOSIS — S83.241D ACUTE MEDIAL MENISCUS TEAR OF RIGHT KNEE, SUBSEQUENT ENCOUNTER: ICD-10-CM

## 2018-06-20 DIAGNOSIS — M17.11 PRIMARY OSTEOARTHRITIS OF RIGHT KNEE: ICD-10-CM

## 2018-06-20 DIAGNOSIS — S83.104A DISLOCATION OF RIGHT KNEE, INITIAL ENCOUNTER: ICD-10-CM

## 2018-06-20 PROCEDURE — 0 GADOBENATE DIMEGLUMINE 529 MG/ML SOLUTION: Performed by: RADIOLOGY

## 2018-06-20 PROCEDURE — 77002 NEEDLE LOCALIZATION BY XRAY: CPT

## 2018-06-20 PROCEDURE — 73722 MRI JOINT OF LWR EXTR W/DYE: CPT

## 2018-06-20 PROCEDURE — A9577 INJ MULTIHANCE: HCPCS | Performed by: RADIOLOGY

## 2018-06-20 PROCEDURE — 25010000002 IOPAMIDOL 61 % SOLUTION: Performed by: RADIOLOGY

## 2018-06-20 RX ORDER — LIDOCAINE HYDROCHLORIDE 10 MG/ML
10 INJECTION, SOLUTION INFILTRATION; PERINEURAL ONCE
Status: COMPLETED | OUTPATIENT
Start: 2018-06-20 | End: 2018-06-20

## 2018-06-20 RX ADMIN — LIDOCAINE HYDROCHLORIDE 3 ML: 10 INJECTION, SOLUTION INFILTRATION; PERINEURAL at 13:43

## 2018-06-20 RX ADMIN — GADOBENATE DIMEGLUMINE 0.05 ML: 529 INJECTION, SOLUTION INTRAVENOUS at 13:43

## 2018-06-20 RX ADMIN — IOPAMIDOL 7.5 ML: 612 INJECTION, SOLUTION INTRAVENOUS at 13:43

## 2018-06-27 ENCOUNTER — OFFICE VISIT (OUTPATIENT)
Dept: ORTHOPEDIC SURGERY | Facility: CLINIC | Age: 58
End: 2018-06-27

## 2018-06-27 ENCOUNTER — TELEPHONE (OUTPATIENT)
Dept: ORTHOPEDIC SURGERY | Facility: CLINIC | Age: 58
End: 2018-06-27

## 2018-06-27 VITALS — BODY MASS INDEX: 27.48 KG/M2 | TEMPERATURE: 98.4 F | WEIGHT: 171 LBS | HEIGHT: 66 IN

## 2018-06-27 DIAGNOSIS — G89.4 CHRONIC PAIN SYNDROME: ICD-10-CM

## 2018-06-27 DIAGNOSIS — M17.11 PRIMARY OSTEOARTHRITIS OF RIGHT KNEE: Primary | ICD-10-CM

## 2018-06-27 DIAGNOSIS — S93.421A TEAR OF DELTOID LIGAMENT OF RIGHT ANKLE, INITIAL ENCOUNTER: ICD-10-CM

## 2018-06-27 PROCEDURE — 99214 OFFICE O/P EST MOD 30 MIN: CPT | Performed by: ORTHOPAEDIC SURGERY

## 2018-06-27 RX ORDER — ALPRAZOLAM 0.25 MG/1
2 TABLET ORAL 3 TIMES DAILY PRN
COMMUNITY
End: 2019-12-17

## 2018-06-27 RX ORDER — HYDROCODONE BITARTRATE AND ACETAMINOPHEN 5; 325 MG/1; MG/1
TABLET ORAL
Qty: 30 TABLET | Refills: 0 | OUTPATIENT
Start: 2018-06-27 | End: 2019-01-27

## 2018-06-27 NOTE — TELEPHONE ENCOUNTER
Please refer patient to Pain management at [facility], [place]. Please make her an appointment Thanks

## 2018-06-29 ENCOUNTER — TELEPHONE (OUTPATIENT)
Dept: ORTHOPEDIC SURGERY | Facility: CLINIC | Age: 58
End: 2018-06-29

## 2018-07-03 ENCOUNTER — TELEPHONE (OUTPATIENT)
Dept: ORTHOPEDIC SURGERY | Facility: CLINIC | Age: 58
End: 2018-07-03

## 2018-07-03 NOTE — TELEPHONE ENCOUNTER
Shi Lara is faxing patient's notes to Dr. Black Fulton for pain management services.  He does take her [employer] insurance.

## 2018-07-03 NOTE — TELEPHONE ENCOUNTER
PT CALLED AND WOULD LIKE TO GO SEE DR DUEÑAS FOR HER PAIN MGMT BECAUSE SHE DOES NOT WANT INJECTIONS AND ALL DR JOEL DOES IS INJECTIONS.  I HAVE FAXED PTS INFO OVER TO DR DUEÑAS TODAY AND SCANNED IN MEDIA MGR/DM

## 2018-07-06 NOTE — TELEPHONE ENCOUNTER
That is the correct approach.  I cannot give her any more pain medication and she needs to see a pain management specialist.  The request for ankle fixation prior to the knee replacement is unreasonable because there is no type of surgery that I know of which can stabilize the ankle which would minimize her risk of instability and damage to the knee replacement surgery.  If she has ankle issues, she should be made her an appointment to see Dr. Aminah ESCOBAR and he can evaluate to see if there is a surgical procedure that can be helpful for the patient in minimizing her symptoms prior to undergoing knee replacement surgery.  Please call her and let her know that this is an option before we finally decided to proceed with knee arthroplasty surgery.

## 2018-07-09 NOTE — TELEPHONE ENCOUNTER
Dr. Almaraz, Who is Dr. Aminah ESCOBAR? I need to know the correct name before I call MsSara Ciara back.  Adry

## 2018-07-11 ENCOUNTER — TELEPHONE (OUTPATIENT)
Dept: ORTHOPEDIC SURGERY | Facility: CLINIC | Age: 58
End: 2018-07-11

## 2018-07-12 RX ORDER — MELOXICAM 15 MG/1
15 TABLET ORAL ONCE
Status: CANCELLED | OUTPATIENT
Start: 2018-07-31 | End: 2018-07-31

## 2018-07-12 RX ORDER — ACETAMINOPHEN 325 MG/1
1000 TABLET ORAL ONCE
Status: CANCELLED | OUTPATIENT
Start: 2018-07-31 | End: 2018-07-31

## 2018-07-12 RX ORDER — PREGABALIN 75 MG/1
150 CAPSULE ORAL ONCE
Status: CANCELLED | OUTPATIENT
Start: 2018-07-31 | End: 2018-07-31

## 2018-07-12 RX ORDER — CLINDAMYCIN PHOSPHATE 900 MG/50ML
900 INJECTION INTRAVENOUS ONCE
Status: CANCELLED | OUTPATIENT
Start: 2018-07-31 | End: 2018-07-31

## 2018-07-20 ENCOUNTER — TELEPHONE (OUTPATIENT)
Dept: ORTHOPEDIC SURGERY | Facility: CLINIC | Age: 58
End: 2018-07-20

## 2018-07-20 NOTE — TELEPHONE ENCOUNTER
I spoke Chan in Dr. Fulton's office this morning. She said they moved her appt up to today at 8:45 to get her in sooner. I called patient at 8:10 this morning when I got her message, but her voice mail was on. She will have to call Dr. Fulton's office and reschedule her appt.

## 2018-07-21 NOTE — TELEPHONE ENCOUNTER
Please note that I will NOT Schedule this patient's knee replacement surgery UNTIL she has established her relationship with a pain clinic specialist M.D. who will provide her with the narcotic prescriptions.  This has been expressly discussed with the patient and her significant other in the office and she has agreed to this stipulation.  No surgery date until the patient has established relationship with pain medicine specialist.

## 2018-07-23 NOTE — TELEPHONE ENCOUNTER
PATIENT CALLED AND NOTIFIED OF SURGERY CANCELATION.  SHE WILL CALL ME WHEN ESTABLISHED WITH DR PANDA TO GET YOON'D.

## 2018-07-24 ENCOUNTER — TELEPHONE (OUTPATIENT)
Dept: ORTHOPEDIC SURGERY | Facility: CLINIC | Age: 58
End: 2018-07-24

## 2018-07-24 ENCOUNTER — APPOINTMENT (OUTPATIENT)
Dept: PREADMISSION TESTING | Facility: HOSPITAL | Age: 58
End: 2018-07-24

## 2018-07-26 ENCOUNTER — TELEPHONE (OUTPATIENT)
Dept: ORTHOPEDIC SURGERY | Facility: CLINIC | Age: 58
End: 2018-07-26

## 2018-07-26 NOTE — TELEPHONE ENCOUNTER
I am really sorry but I am not able to prescribe her any narcotics whatsoever.  We have talked about this at length and she needs to see a PCP or move her appointment up at the pain clinic to help her on out.  Once again with the [place] state laws I am unable to help her with this request.

## 2018-08-02 ENCOUNTER — TELEPHONE (OUTPATIENT)
Dept: ORTHOPEDIC SURGERY | Facility: CLINIC | Age: 58
End: 2018-08-02

## 2018-08-02 NOTE — TELEPHONE ENCOUNTER
"PATIENT CONTACT OFFICE TO LET DR CHEW KNOW SHE HAS AN APPOINTMENT WITH PAIN MANAGEMENT ON 8/14/18.    SHE THEN SAYS SHE FELL ON BOTH KNEES LAST NIGHT AND \"DISLOCATED\" HER KNEE CAP BUT SHE WOULD USE \"DUCT TAPE TO PUT IT BACK INTO PLACE\"    PATIENT DID NOT GO TO EMERGENCY ROOM. SHE KEPT REPEATING HERSELF WITH REGARDS TO WANTING \"JUST ENOUGH PAIN MED TO HOLD HER UNTIL AUGUST 14\"     SHE ALSO BEGGED AND PLEADED FOR DR CHEW TO SHOW HER [facility] AND \"AFTER ALL SHE IS STILL A HUMAN AND HIS PATIENT AND NEEDS SOME PAIN MEDICATION TO LAST UNTIL SHE SEES PAIN MANAGEMENT\"      "

## 2018-08-06 NOTE — TELEPHONE ENCOUNTER
I am really sorry but I am not able to help in terms of pain management or new prescriptions.  We have discussed this several times with the patient in the office.  She also understands that she can get this medication either from her PCP or the pain management clinic.  The laws in the [facility] have changed and there is no preservation where I can take care of her preoperative needs.  She will need to maintain the relationship with the pain management specialist postoperatively as well.  I will be able to write her 1 or 2 prescriptions after the knee replacement but beyond that she will have to go back to the primary provider of the pain medication.

## 2018-08-15 ENCOUNTER — TELEPHONE (OUTPATIENT)
Dept: ORTHOPEDIC SURGERY | Facility: CLINIC | Age: 58
End: 2018-08-15

## 2018-08-15 NOTE — TELEPHONE ENCOUNTER
Patient called upset and yelling about wanting to get pain meds. She said she went to Dr. Fulton and all he gave her was Tramadol. She says that is not going to help her and all she wants to do is clean her house before surgery and can't do anything on Tramadol. I advised her to contact the pain management office and/ or PCP about this. She wanted me to still send a message for you.  Please advise

## 2018-08-16 ENCOUNTER — TELEPHONE (OUTPATIENT)
Dept: ORTHOPEDIC SURGERY | Facility: CLINIC | Age: 58
End: 2018-08-16

## 2018-08-16 ENCOUNTER — APPOINTMENT (OUTPATIENT)
Dept: PREADMISSION TESTING | Facility: HOSPITAL | Age: 58
End: 2018-08-16

## 2018-08-16 NOTE — TELEPHONE ENCOUNTER
I am not in a position to help this patient with any narcotic prescription.  It seems like to me that she has some major issues regarding the need and dependence on pain medication.  I think we need to get this pain medication matter sorted out and the patient  weaned off narcotics completely before we can safely proceed with surgery.  I would recommend putting her surgery on hold until such time that we can get the issue with narcotic medication use fully clarified and the patient makes a serious effort to wean herself off all forms of narcotic medication.  If she is unable to get an appropriate assistance from the office of Dr. Fulton, I would then suggest inpatient counseling and help from an institution such as our [facility] to help ring this patient to a more functional status which is not dependent on long-term narcotic use.

## 2018-08-21 ENCOUNTER — TELEPHONE (OUTPATIENT)
Dept: ORTHOPEDIC SURGERY | Facility: CLINIC | Age: 58
End: 2018-08-21

## 2018-08-21 ENCOUNTER — APPOINTMENT (OUTPATIENT)
Dept: PREADMISSION TESTING | Facility: HOSPITAL | Age: 58
End: 2018-08-21

## 2019-01-03 ENCOUNTER — TELEPHONE (OUTPATIENT)
Dept: ORTHOPEDIC SURGERY | Facility: CLINIC | Age: 59
End: 2019-01-03

## 2019-01-03 NOTE — TELEPHONE ENCOUNTER
I am really sorry but I cannot help this patient with any pain medication.  [place] state laws will not allow me.  [facility] will not allow me.  And our office policy will not allow me.  She really truly needs to get a pain clinic specialist to help her with symptom management.  If not then she needs to be seen in the office, get a new x-ray and then may be of evaluation might reflect that she needs some pain pills.  But I would rather that she connect up with a pain clinic for further management of her chronic pain.

## 2019-01-03 NOTE — TELEPHONE ENCOUNTER
PATIENT CALLED IN BEGGING FOR A FEW DAYS OF PAIN MEDICINE TO HELP HER GET THROUGH MOVING. SHE HAS NOT BEEN SEEN IN OUR OFFICE SINCE 6/27/18 AND CANCELLED HER RIGHT TOTAL KNEE REPLACEMENT IN AUGUST. SHE WAS BEING TREATED BY A PAIN MANAGEMENT CLINIC BUT IS NO LONGER A PATIENT WITH THEM DUE TO TRANSPORTATION PROBLEMS. PLEASE ADVISE.

## 2019-01-18 ENCOUNTER — TELEPHONE (OUTPATIENT)
Dept: ORTHOPEDIC SURGERY | Facility: CLINIC | Age: 59
End: 2019-01-18

## 2019-01-27 ENCOUNTER — HOSPITAL ENCOUNTER (EMERGENCY)
Facility: HOSPITAL | Age: 59
Discharge: HOME | End: 2019-01-27
Attending: EMERGENCY MEDICINE | Admitting: EMERGENCY MEDICINE

## 2019-01-27 ENCOUNTER — APPOINTMENT (OUTPATIENT)
Dept: GENERAL RADIOLOGY | Facility: HOSPITAL | Age: 59
End: 2019-01-27

## 2019-01-27 VITALS
WEIGHT: 150 LBS | TEMPERATURE: 97.8 F | SYSTOLIC BLOOD PRESSURE: 133 MMHG | HEIGHT: 66 IN | RESPIRATION RATE: 17 BRPM | DIASTOLIC BLOOD PRESSURE: 90 MMHG | OXYGEN SATURATION: 99 % | BODY MASS INDEX: 24.11 KG/M2 | HEART RATE: 80 BPM

## 2019-01-27 DIAGNOSIS — M17.11 ARTHRITIS OF KNEE, RIGHT: Primary | ICD-10-CM

## 2019-01-27 DIAGNOSIS — S93.401A SPRAIN OF RIGHT ANKLE, UNSPECIFIED LIGAMENT, INITIAL ENCOUNTER: ICD-10-CM

## 2019-01-27 PROCEDURE — 73560 X-RAY EXAM OF KNEE 1 OR 2: CPT

## 2019-01-27 PROCEDURE — 99284 EMERGENCY DEPT VISIT MOD MDM: CPT

## 2019-01-27 PROCEDURE — 73610 X-RAY EXAM OF ANKLE: CPT

## 2019-01-27 RX ORDER — HYDROCODONE BITARTRATE AND ACETAMINOPHEN 5; 325 MG/1; MG/1
1 TABLET ORAL EVERY 6 HOURS PRN
Qty: 8 TABLET | Refills: 0 | Status: SHIPPED | OUTPATIENT
Start: 2019-01-27 | End: 2019-10-07

## 2019-01-27 RX ORDER — HYDROCODONE BITARTRATE AND ACETAMINOPHEN 7.5; 325 MG/1; MG/1
1 TABLET ORAL ONCE
Status: COMPLETED | OUTPATIENT
Start: 2019-01-27 | End: 2019-01-27

## 2019-01-27 RX ADMIN — HYDROCODONE BITARTRATE AND ACETAMINOPHEN 1 TABLET: 7.5; 325 TABLET ORAL at 16:43

## 2019-04-24 ENCOUNTER — OFFICE VISIT (OUTPATIENT)
Dept: ORTHOPEDIC SURGERY | Facility: CLINIC | Age: 59
End: 2019-04-24

## 2019-04-24 VITALS — HEIGHT: 66 IN | TEMPERATURE: 99.2 F | BODY MASS INDEX: 26.26 KG/M2 | WEIGHT: 163.4 LBS

## 2019-04-24 DIAGNOSIS — M17.11 PRIMARY OSTEOARTHRITIS OF RIGHT KNEE: Primary | ICD-10-CM

## 2019-04-24 PROCEDURE — 99214 OFFICE O/P EST MOD 30 MIN: CPT | Performed by: ORTHOPAEDIC SURGERY

## 2019-04-24 NOTE — PROGRESS NOTES
Chief Complaint   Patient presents with   • Right Knee - Follow-up, Pain           HPI Patient is here today for a follow up of her right knee.  She states that she has been having increasing pain and discomfort in this knee.  She has difficulty with ambulation.  She has tried intra-articular steroid injections which are only partly beneficial for her symptoms.  She has difficulty in going up and down the steps.  This is affecting her quality of life in a negative fashion.  The patient had a knee arthroscopy for a torn meniscus.  That definitely helped her symptoms to some degree.  However the effect of the knee scope has worn off and she is back to hurting quite significantly.  The patient states that she has medial sided joint line pain and discomfort.  She has a sense of grinding and catching of the knee.  She states that the knee tends to buckle and give out from underneath her.  She is concerned about not being able to exercise and walk because of her knee pathology.  The patient would now like to proceed with knee replacement surgery.  She has moved to [facility] from the [facility].  She would prefer that I perform the surgery for her at [facility] in [facility].        Vitals:    04/24/19 1538   Temp: 99.2 °F (37.3 °C)           Review of Systems   Constitutional: Negative.    HENT: Negative.    Eyes: Negative.    Respiratory: Negative.    Cardiovascular: Negative.    Gastrointestinal: Negative.    Endocrine: Negative.    Genitourinary: Negative.    Musculoskeletal: Positive for arthralgias and myalgias.   Skin: Negative.    Allergic/Immunologic: Negative.    Neurological: Negative.    Hematological: Negative.    Psychiatric/Behavioral: Negative.            Physical Exam   Constitutional: She is oriented to person, place, and time. She appears well-nourished.   HENT:   Head: Atraumatic.   Eyes: EOM are normal.   Neck: Neck supple.   Cardiovascular: Normal heart sounds and intact  distal pulses.   Pulmonary/Chest: Breath sounds normal.   Abdominal: Bowel sounds are normal.   Musculoskeletal: She exhibits edema, tenderness and deformity.   Neurological: She is alert and oriented to person, place, and time.   Skin: Skin is dry. Capillary refill takes 2 to 3 seconds.   Psychiatric: She has a normal mood and affect. Her behavior is normal. Judgment and thought content normal.   Nursing note and vitals reviewed.          Joint/Body Part Specific Exam:Right knee. Patient has crepitus throughout range of motion. Positive patellar grind test. Mild effusion. Lachman is negative. Pivot shift is negative. Anterior and posterior drawer signs are negative. Significant joint line tenderness is noted on the medial aspect of the knee. Patient has a varus orientation of the knee. There is fullness and tenderness in the Popliteal fossa. Mild distention of a Popliteal cyst is noted in this location. Range of motion in flexion is from 0-110  degrees. Neurovascular status is intact.  Dorsalis pedis and posterior tibial artery pulses are palpable. Common peroneal nerve function is well preserved. Patient's gait is cautious and antalgic. Skin and soft tissues are mildly swollen, consistent with synovitis and effusion. The patient has a significant limp with the first few steps after starting the gait cycle. Getting out of a chair takes a lot of effort due to pain on knee flexion.        X-RAY Report: Previously obtained x-rays are reviewed.  There is a complete loss of medial joint space.  Osteophyte formation is noted.  Bone-on-bone appearance is noted.  Patellofemoral distance is significantly narrowed.  Her K-L grade is 3.  Images of the knee are discussed with the patient and are the basis of my recommendation to proceed with knee replacement surgery.        Diagnostics:        Seda was seen today for follow-up and pain.    Diagnoses and all orders for this visit:    Primary osteoarthritis of right  knee            Procedures        Plan: Use a supportive brace on the knee.    Ice to the knee.    Tablet ibuprofen 600 mg orally twice daily for pain swelling and discomfort.    I cannot prescribe her any narcotics whatsoever and she states that she will get her pain medication from the pain clinic.    Time spent in the office today with the patient and her significant other is 30 minutes discussing the pros and cons of total knee arthroplasty.  Risks and benefits were discussed with the patient as well.    The patient was seen today for preoperative discussion.  The patient has been tried on over-the-counter and prescription NSAID's despite the risks of anti-inflammatory bleeding, peptic ulcers and erosive gastritis with short term benefit only.  Braces have been prescribed for mechanical support.  Patient has been participating in an exercise program specifically targeting joint pain relief with limited benefit. Intraarticular injections have been used periodically with some but not complete relief of pain.  Ambulation aids have also been utilized.      The details of the surgical procedure were explained including the location of probable incisions and a description of the likely hardware/grafts to be used. The patient understands the likely convalescence after surgery as well as the rehabilitation required.  Also, we have thoroughly discussed with the patient the risks, benefits and alternatives to surgery.  Risks include but are not limited to the risk of infection, joint stiffness, limited range of motion, wound healing problems, scar tissue build up, myocardial infarction, stroke, blood clots (including DVT and/or pulmonary embolus along with the risk of death) neurologic and/or vascular injury, limb length discrepancy, fracture, dislocation, nonunion, malunion, continued pain and need for further surgery including hardware failure requiring revision.

## 2019-05-03 ENCOUNTER — PREP FOR SURGERY (OUTPATIENT)
Dept: OTHER | Facility: HOSPITAL | Age: 59
End: 2019-05-03

## 2019-05-03 DIAGNOSIS — M17.11 PRIMARY OSTEOARTHRITIS OF RIGHT KNEE: Primary | ICD-10-CM

## 2019-05-03 RX ORDER — MELOXICAM 15 MG/1
15 TABLET ORAL ONCE
Status: CANCELLED | OUTPATIENT
Start: 2019-06-18 | End: 2019-05-03

## 2019-05-03 RX ORDER — ACETAMINOPHEN 500 MG
1000 TABLET ORAL ONCE
Status: CANCELLED | OUTPATIENT
Start: 2019-06-18 | End: 2019-05-03

## 2019-05-03 RX ORDER — PREGABALIN 75 MG/1
150 CAPSULE ORAL ONCE
Status: CANCELLED | OUTPATIENT
Start: 2019-06-18 | End: 2019-05-03

## 2019-05-03 RX ORDER — CLINDAMYCIN PHOSPHATE 900 MG/50ML
900 INJECTION INTRAVENOUS ONCE
Status: CANCELLED | OUTPATIENT
Start: 2019-06-18 | End: 2019-05-03

## 2019-05-16 ENCOUNTER — HOSPITAL ENCOUNTER (OUTPATIENT)
Facility: HOSPITAL | Age: 59
Setting detail: SURGERY ADMIT
End: 2019-05-16
Attending: ORTHOPAEDIC SURGERY | Admitting: ORTHOPAEDIC SURGERY

## 2019-06-03 ENCOUNTER — TELEPHONE (OUTPATIENT)
Dept: ORTHOPEDIC SURGERY | Facility: CLINIC | Age: 59
End: 2019-06-03

## 2019-06-06 ENCOUNTER — APPOINTMENT (OUTPATIENT)
Dept: PREADMISSION TESTING | Facility: HOSPITAL | Age: 59
End: 2019-06-06

## 2019-06-19 ENCOUNTER — TELEPHONE (OUTPATIENT)
Dept: ORTHOPEDIC SURGERY | Facility: CLINIC | Age: 59
End: 2019-06-19

## 2019-06-19 NOTE — TELEPHONE ENCOUNTER
Patient called and stated she is a [facility] patient of yours and she is requesting a referral to pain management.

## 2019-06-19 NOTE — TELEPHONE ENCOUNTER
Please go ahead and make the patient an appointment with the pain clinic.  I cannot prescribe her any narcotics now or perioperatively.  I will be happy to see her in the office for her knee problems in a couple weeks at the [facility].  Thank you

## 2019-06-20 DIAGNOSIS — M17.11 PRIMARY OSTEOARTHRITIS OF RIGHT KNEE: Primary | ICD-10-CM

## 2019-06-20 NOTE — TELEPHONE ENCOUNTER
Entered referral and tried to call and notify patient but mailbox is full so I couldn't leave a message.

## 2019-06-21 NOTE — TELEPHONE ENCOUNTER
Okay.  I appreciate you trying.  Please give it one more shot tomorrow or Monday.  Unfortunately this patient's communication levels are very limited and she does this a lot in terms of not returning phone calls and having numbers that are not reliably answered.  Thanks

## 2019-06-24 ENCOUNTER — TELEPHONE (OUTPATIENT)
Dept: ORTHOPEDIC SURGERY | Facility: CLINIC | Age: 59
End: 2019-06-24

## 2019-06-24 NOTE — TELEPHONE ENCOUNTER
Patient is already scheduled for a office visit with you and she called and I notified her that order was in for pain management.

## 2019-06-24 NOTE — TELEPHONE ENCOUNTER
PATIENT LEFT MESSAGE WITH ANSWERING SERVICE ON 06/24/2019. RETURNED PATIENTS CALL. DID NOT ANSWER AND HER MAILBOX WAS FULL COULD NOT LEAVE MESSAGE.

## 2019-08-06 ENCOUNTER — HOSPITAL ENCOUNTER (EMERGENCY)
Facility: HOSPITAL | Age: 59
Discharge: HOME | End: 2019-08-06
Admitting: EMERGENCY MEDICINE

## 2019-08-06 ENCOUNTER — OFFICE VISIT (OUTPATIENT)
Dept: ORTHOPEDIC SURGERY | Facility: CLINIC | Age: 59
End: 2019-08-06

## 2019-08-06 VITALS
TEMPERATURE: 98.5 F | WEIGHT: 168 LBS | HEART RATE: 70 BPM | BODY MASS INDEX: 27 KG/M2 | OXYGEN SATURATION: 98 % | SYSTOLIC BLOOD PRESSURE: 102 MMHG | HEIGHT: 66 IN | DIASTOLIC BLOOD PRESSURE: 68 MMHG | RESPIRATION RATE: 15 BRPM

## 2019-08-06 VITALS
RESPIRATION RATE: 18 BRPM | SYSTOLIC BLOOD PRESSURE: 98 MMHG | BODY MASS INDEX: 27 KG/M2 | DIASTOLIC BLOOD PRESSURE: 62 MMHG | WEIGHT: 168 LBS | HEIGHT: 66 IN | HEART RATE: 75 BPM | OXYGEN SATURATION: 98 %

## 2019-08-06 DIAGNOSIS — M25.561 RIGHT KNEE PAIN, UNSPECIFIED CHRONICITY: Primary | ICD-10-CM

## 2019-08-06 DIAGNOSIS — M17.11 PRIMARY OSTEOARTHRITIS OF RIGHT KNEE: ICD-10-CM

## 2019-08-06 DIAGNOSIS — G89.29 CHRONIC PAIN OF LEFT KNEE: Primary | ICD-10-CM

## 2019-08-06 DIAGNOSIS — R56.9 SEIZURE-LIKE ACTIVITY (HCC): ICD-10-CM

## 2019-08-06 DIAGNOSIS — M25.562 CHRONIC PAIN OF LEFT KNEE: Primary | ICD-10-CM

## 2019-08-06 PROCEDURE — 99283 EMERGENCY DEPT VISIT LOW MDM: CPT

## 2019-08-06 PROCEDURE — 99204 OFFICE O/P NEW MOD 45 MIN: CPT | Performed by: ORTHOPAEDIC SURGERY

## 2019-08-06 RX ORDER — TRAMADOL HYDROCHLORIDE 50 MG/1
50 TABLET ORAL EVERY 4 HOURS PRN
Qty: 10 TABLET | Refills: 0 | Status: SHIPPED | OUTPATIENT
Start: 2019-08-06 | End: 2019-10-07

## 2019-08-06 RX ORDER — GABAPENTIN 800 MG/1
800 TABLET ORAL 3 TIMES DAILY
COMMUNITY
End: 2020-06-23 | Stop reason: SDUPTHER

## 2019-08-06 RX ORDER — HYDROCODONE BITARTRATE AND ACETAMINOPHEN 5; 325 MG/1; MG/1
1 TABLET ORAL ONCE AS NEEDED
Status: DISCONTINUED | OUTPATIENT
Start: 2019-08-06 | End: 2019-08-06 | Stop reason: HOSPADM

## 2019-08-06 RX ADMIN — HYDROCODONE BITARTRATE AND ACETAMINOPHEN 1 TABLET: 5; 325 TABLET ORAL at 16:48

## 2019-08-06 NOTE — ED PROVIDER NOTES
Subjective   Patient is a 59-year-old white female with history of chronic knee and back pain who presents today with complaints of right knee pain.  Patient states she is scheduled to have a right knee replacement next week.  She also states she is scheduled to start pain management in 3 days.  She states her orthopedic surgeon stated he could not write her pain medication until after her surgery.  She states she was instructed by her orthopedic surgeon to come to the ED for pain management.  She denies any recent injury, or fevers.            Review of Systems   Constitutional: Negative for fever.   Musculoskeletal:        Right knee pain   Neurological: Negative for weakness and numbness.       Past Medical History:   Diagnosis Date   • Anxiety    • Depression    • Hypertension    • Knee swelling        Allergies   Allergen Reactions   • Keflex [Cephalexin] Swelling   • Metoprolol Hives       Past Surgical History:   Procedure Laterality Date   • ANKLE SURGERY Left 2003   • HAND SURGERY Right    • JOINT REPLACEMENT      7 knee revision surgery left knee    • KNEE SURGERY Left 2005       Family History   Problem Relation Age of Onset   • Deep vein thrombosis Other    • Hypertension Other        Social History     Socioeconomic History   • Marital status: Single     Spouse name: Not on file   • Number of children: Not on file   • Years of education: Not on file   • Highest education level: Not on file   Tobacco Use   • Smoking status: Current Every Day Smoker     Packs/day: 0.50     Types: Cigarettes   • Smokeless tobacco: Never Used   Substance and Sexual Activity   • Alcohol use: No   • Drug use: No   • Sexual activity: Defer           Objective   Physical Exam   Constitutional: She appears well-developed.   Vital signs and triage nurse note reviewed.  Constitutional: Awake, alert; well-developed and well-nourished. No acute distress is noted.  Cardiovascular: Regular rate and rhythm  Pulmonary: Respiratory  effort regular nonlabored.  Musculoskeletal: Independent range of motion of all extremities.  There is tenderness over the right knee diffusely.  There is no underlying erythema ecchymosis or edema.  Negative anterior posterior drawer.  Negative Marie.  Distal neurovascular motor intact.  Neuro: Alert oriented x3, speech is clear and appropriate, GCS 15.    Skin: Flesh tone, warm, dry, intact; no erythematous or petechial rash or lesion.        Procedures           ED Course      Labs Reviewed - No data to display  Xr Knee 1 Or 2 View Left    Result Date: 8/6/2019  Extensive postsurgical changes within the knee joint. Satisfactory alignment with no acute findings  Electronically Signed By-Flavio Husain On:8/6/2019 3:54 PM This report was finalized on [id number] by  Flavio Husain, .    Medications   HYDROcodone-acetaminophen (NORCO) 5-325 MG per tablet 1 tablet (1 tablet Oral Given 8/6/19 6691)               MDM  Number of Diagnoses or Management Options  Risk of Complications, Morbidity, and/or Mortality  General comments: Comorbidities: Chronic pain  Differentials:  ;this list is not all inclusive and does not constitute the entirety of considered causes    Patient was given one [place] here in the ED.  Inspect was reviewed.  Patient was receiving monthly alprazolam until April 2019.  Norco filled in January.    Diagnosis and treatment plan discussed with patient.  Patient agreeable to plan.   I discussed findings with patient who voices understanding of discharge instructions, signs and symptoms requiring return to ED; discharged improved and in stable condition with follow up for re-evaluation.  Prescription for tramadol.      Patient Progress  Patient progress: stable        Final diagnoses:   Right knee pain, unspecified chronicity            Agatha Gandhi, DICK  08/06/19 8863

## 2019-08-06 NOTE — ED NOTES
"Pt c/o BETTIE knee pain reports to have surgery in about 1 week. Reports was sent to ED to be help with 'pain\"     Kasandra Rosenbaum RN  08/06/19 6256    "

## 2019-08-07 PROBLEM — G89.29 CHRONIC PAIN OF LEFT KNEE: Status: ACTIVE | Noted: 2019-08-07

## 2019-08-07 PROBLEM — M25.562 CHRONIC PAIN OF LEFT KNEE: Status: ACTIVE | Noted: 2019-08-07

## 2019-08-07 RX ORDER — ACETAMINOPHEN 325 MG/1
1000 TABLET ORAL ONCE
Status: CANCELLED | OUTPATIENT
Start: 2019-08-07 | End: 2019-08-07

## 2019-08-07 RX ORDER — PREGABALIN 150 MG/1
150 CAPSULE ORAL ONCE
Status: CANCELLED | OUTPATIENT
Start: 2019-08-07 | End: 2019-08-07

## 2019-08-07 RX ORDER — MELOXICAM 7.5 MG/1
15 TABLET ORAL ONCE
Status: CANCELLED | OUTPATIENT
Start: 2019-08-07 | End: 2019-08-07

## 2019-08-07 NOTE — PROGRESS NOTES
NEW VISIT    Patient: Seda Urbina  ?  YOB: 1960    MRN: [id number]  ?  Chief Complaint   Patient presents with   • Right Knee - Pain      ?  HPI: This patient is known to me from previous visits in the [facility] office.  She is undergone a left total knee arthroplasty performed by Dr. Butt who has since left [facility].  She underwent a revision surgery performed by Dr. Roosevelt Bee.  She then sustained a distal femoral fracture in the periprosthetic location.  This was addressed with plate fixation with a lateral locking plate.  She continues to have some pain and discomfort on the lateral aspect of her left femur.  She states that she has fallen multiple times because her right knee is bothering her significantly.  She has difficulty going up and down the steps.  Occasionally the knee will buckle and give out from underneath her.  She has quite a bit of swelling by the end of the day.  She has tried intra-articular injections and arthroscopy for a bucket-handle tear of the medial meniscus on the right side.  She states that her right knee is very symptomatic and she would like to proceed with total knee arthroplasty.  We have scheduled this patient for surgery in the past but she had multiple social problems including issues with incarceration of her daughter.  She now states that the social issues have resolved and she has moved to the [facility] area and would like to proceed with total knee arthroplasty on the right side to improve her quality of life.  Pain Location: On the medial aspect of the right knee.  Radiation: none  Quality: dull and aching  Intensity/Severity: moderate: She states that her knee pain is consistently 8 on a scale of 1-10.  Duration: For the past 2 to 3 years.  Onset quality: gradual   Timing: intermittent  Aggravating Factors: Deep flexion of the knee and going up and down the steps.  Alleviating Factors: Using a brace on the knee and anti-inflammatory  medication.  Previous Episodes: yes  Associated Symptoms: pain, swelling, decreased ROM  Previous Treatment: Knee arthroscopy with intra-articular injections of a steroid.    This patient is a new patient.  This problem is not new to this examiner.      Allergies:   Allergies   Allergen Reactions   • Keflex [Cephalexin] Swelling   • Metoprolol Hives       Medications:   Home Medications:  Current Outpatient Medications on File Prior to Visit   Medication Sig   • ALPRAZolam (XANAX) 0.25 MG tablet Take 0.25 mg by mouth 2 (Two) Times a Day As Needed for Anxiety.   • amLODIPine (NORVASC) 10 MG tablet TK 1 T PO QD   • baclofen (LIORESAL) 10 MG tablet TK 1 T PO BID PRN   • estradiol (ESTRACE) 0.5 MG tablet TK 1 T PO QD   • losartan-hydrochlorothiazide (HYZAAR) 100-25 MG per tablet Take 1 tablet by mouth Daily.   • omeprazole (PriLOSEC) 20 MG capsule TK ONE C PO QD   • oxybutynin XL (DITROPAN-XL) 10 MG 24 hr tablet    • traZODone (DESYREL) 100 MG tablet TK 2 TS PO QHS   • HYDROcodone-acetaminophen (NORCO) 5-325 MG per tablet Take 1 tablet by mouth Every 6 (Six) Hours As Needed for Moderate Pain . (Patient not taking: Reported on 8/6/2019)     No current facility-administered medications on file prior to visit.      Current Medications:  Scheduled Meds:  PRN Meds:.    I have reviewed the patient's medical history in detail and updated the computerized patient record.  Review and summarization of old records include:    Past Medical History:   Diagnosis Date   • Anxiety    • Depression    • Hypertension    • Knee swelling      Past Surgical History:   Procedure Laterality Date   • ANKLE SURGERY Left 2003   • HAND SURGERY Right    • JOINT REPLACEMENT      7 knee revision surgery left knee    • KNEE SURGERY Left 2005     Social History     Occupational History   • Not on file   Tobacco Use   • Smoking status: Current Every Day Smoker     Packs/day: 0.50     Types: Cigarettes   • Smokeless tobacco: Never Used   Substance and  "Sexual Activity   • Alcohol use: No   • Drug use: No   • Sexual activity: Defer      Social History     Social History Narrative   • Not on file     Family History   Problem Relation Age of Onset   • Deep vein thrombosis Other    • Hypertension Other          Review of Systems  Constitutional: Negative for appetite change.   HENT: Negative.    Eyes: Negative.    Respiratory: Negative.    Cardiovascular: Negative.    Gastrointestinal: Negative.    Endocrine: Negative.    Genitourinary: Negative.    Musculoskeletal: See details of exam below.  Skin: Negative.    Allergic/Immunologic: Negative.    Hematological: Negative.    Psychiatric/Behavioral: Negative.         Wt Readings from Last 3 Encounters:   08/06/19 76.2 kg (168 lb)   08/06/19 76.2 kg (168 lb)   04/24/19 74.1 kg (163 lb 6.4 oz)     Ht Readings from Last 3 Encounters:   08/06/19 167.6 cm (66\")   08/06/19 167.6 cm (66\")   04/24/19 166.4 cm (65.5\")     Body mass index is 27.12 kg/m².  Facility age limit for growth percentiles is 20 years.  Vitals:    08/06/19 1431   BP: 98/62   Pulse: 75   Resp: 18   SpO2: 98%         Physical Exam  Constitutional: Patient is oriented to person, place, and time. Appears well-developed and well-nourished.   HENT:   Head: Normocephalic and atraumatic.   Eyes: Conjunctivae and EOM are normal. Pupils are equal, round, and reactive to light.   Cardiovascular: Normal rate, regular rhythm, normal heart sounds and intact distal pulses.   Pulmonary/Chest: Effort normal and breath sounds normal.   Musculoskeletal:   See detailed exam below   Neurological: Alert and oriented to person, place, and time. No sensory deficit. Coordination normal.   Skin: Skin is warm and dry. Capillary refill takes less than 2 seconds. No rash noted. No erythema.   Psychiatric: Patient has a normal mood and affect. Her behavior is normal. Judgment and thought content normal.   Nursing note and vitals reviewed.      Ortho Exam:   Right knee (varus). Patient " has crepitus throughout range of motion. Positive patellar grind test. Mild effusion. Lachman is negative. Pivot shift is negative. Anterior and posterior drawer signs are negative. Significant joint line tenderness is noted on the medial aspect of the knee. Patient has a varus orientation of the knee. There is fullness and tenderness in the Popliteal fossa. Mild distention of a Popliteal cyst is noted in this location. Range of motion in flexion is from 0 to 100 degrees degrees. Neurovascular status is intact.  Dorsalis pedis and posterior tibial artery pulses are palpable. Common peroneal nerve function is well preserved. Patient's gait is cautious and antalgic. Skin and soft tissues are mildly swollen, consistent with synovitis and effusion. The patient has a significant limp with the first few steps after starting the gait cycle. Getting out of a chair takes a lot of effort due to pain on knee flexion.     Left knee.The patient is status post total knee arthroplasty postoperative 7 year(s). Incision is clean. Calf is soft and nontender. Homans sign is negative. There is no clicking, popping or catching. Anterior and posterior drawer signs are negative.  There is no instability of the components. Appropriate amounts of swelling and bruising are noted. Dorsalis pedis and posterior tibial artery pulses are palpable. Common peroneal nerve function is well preserved. Range of motion is from 0-110 degrees of flexion. Gait is cautious but otherwise fairly normal. There is no evidence of a deep seated joint infection.      Diagnostics:left Knee X-Ray  Indication: Evaluation of pain following revision knee surgery and a periprosthetic fracture.  AP, Lateral views  Findings: Well-placed implants with a healed periprosthetic fracture without any loosening of the screws.  no bony lesion  Soft tissues within normal limits  within normal limits joint spaces  Hardware appropriately positioned yes      yes prior studies  available for comparison.    This patient's x-ray report was graded according to the Kellgren and Cory classification.  This took into account the joint space narrowing, osteophyte formation, sclerosis of the distal femur/proximal tibia along with deformity of those bones.  The findings were indicative of K L grade 4 on the right knee which was evaluated from previous x-rays.  My recommendation to proceed with a right total knee arthroplasty were based on reviewing films from the right side which were obtained about 3 months ago..    X-RAY was ordered and reviewed by Meño Almaraz MD       Assessment:  Seda was seen today for pain.    Diagnoses and all orders for this visit:    Chronic pain of left knee  -     XR Knee 1 or 2 View Left    Seizure-like activity (CMS/HCC)    Primary osteoarthritis of right knee          Procedures  ?    Plan    · Compression/brace to the right knee.  · Risks and benefits of surgical intervention discussed with the patient at length.  · Time spent in the office today with the patient and her  is 45 minutes of which greater than 50% of my time was spent face-to-face with the discussion of risks benefits and alternatives of surgery.  The potential complications and the rationale of the surgical decision-making process was also gone over with the patient.  · I have made it very clear to the patient that I will not be able to prescribe any narcotics for her whatsoever.  She states that she is establishing her care with the [place] pain clinic and will be receiving her medication from the pain management center.  · Rest, ice, compression, and elevation (RICE) therapy  · Stretching and strengthening exercises of the quads and the hamstrings.  · OTC Tylenol 500-1000mg by mouth every 6 hours as needed for pain   · Follow up in 6 week(s)  · Discussed with the patient that we can make a referral for home health for the patient to come see her at the house and evaluate the  home setting in a preoperative visit.  · Knee sleeves and braces use discussed with the patient at length.  The patient was seen today for preoperative discussion.  The patient has been tried on over-the-counter and prescription NSAID's despite the risks of anti-inflammatory bleeding, peptic ulcers and erosive gastritis with short term benefit only.  Braces have been prescribed for mechanical support.  Patient has been participating in an exercise program specifically targeting joint pain relief with limited benefit. Intraarticular injections have been used periodically with some but not complete relief of pain.  Ambulation aids have also been utilized.      · The details of the surgical procedure were explained including the location of probable incisions and a description of the likely hardware/grafts to be used. The patient understands the likely convalescence after surgery as well as the rehabilitation required.  Also, we have thoroughly discussed with the patient the risks, benefits and alternatives to surgery.  Risks include but are not limited to the risk of infection, joint stiffness, limited range of motion, wound healing problems, scar tissue build up, myocardial infarction, stroke, blood clots (including DVT and/or pulmonary embolus along with the risk of death) neurologic and/or vascular injury, limb length discrepancy, fracture, dislocation, nonunion, malunion, continued pain and need for further surgery including hardware failure requiring revision.     Date of encounter: 08/06/2019   Meño Almaraz MD

## 2019-08-29 RX ORDER — PREGABALIN 150 MG/1
150 CAPSULE ORAL ONCE
Status: CANCELLED | OUTPATIENT
Start: 2019-08-29 | End: 2019-08-29

## 2019-08-29 RX ORDER — ACETAMINOPHEN 325 MG/1
1000 TABLET ORAL ONCE
Status: CANCELLED | OUTPATIENT
Start: 2019-08-29 | End: 2019-08-29

## 2019-08-29 RX ORDER — MELOXICAM 7.5 MG/1
15 TABLET ORAL ONCE
Status: CANCELLED | OUTPATIENT
Start: 2019-08-29 | End: 2019-08-29

## 2019-10-07 ENCOUNTER — HOSPITAL ENCOUNTER (OUTPATIENT)
Dept: GENERAL RADIOLOGY | Facility: HOSPITAL | Age: 59
Discharge: HOME | End: 2019-10-07
Admitting: ORTHOPAEDIC SURGERY

## 2019-10-07 ENCOUNTER — APPOINTMENT (OUTPATIENT)
Dept: PREADMISSION TESTING | Facility: HOSPITAL | Age: 59
End: 2019-10-07

## 2019-10-07 VITALS
OXYGEN SATURATION: 99 % | WEIGHT: 160 LBS | SYSTOLIC BLOOD PRESSURE: 155 MMHG | HEIGHT: 66 IN | BODY MASS INDEX: 25.71 KG/M2 | DIASTOLIC BLOOD PRESSURE: 94 MMHG | HEART RATE: 75 BPM

## 2019-10-07 DIAGNOSIS — M17.11 PRIMARY OSTEOARTHRITIS OF RIGHT KNEE: ICD-10-CM

## 2019-10-07 LAB
ABO GROUP BLD: NORMAL
ANION GAP SERPL CALCULATED.3IONS-SCNC: 13.2 MMOL/L (ref 5–15)
APTT PPP: 25.3 SECONDS (ref 24–31)
BILIRUB UR QL STRIP: NEGATIVE
BLD GP AB SCN SERPL QL: NEGATIVE
BUN BLD-MCNC: 7 MG/DL (ref 8–20)
BUN/CREAT SERPL: 11.7 (ref 5.4–26.2)
CALCIUM SPEC-SCNC: 8.8 MG/DL (ref 8.9–10.3)
CHLORIDE SERPL-SCNC: 95 MMOL/L (ref 101–111)
CLARITY UR: CLEAR
CO2 SERPL-SCNC: 23 MMOL/L (ref 22–32)
COLOR UR: YELLOW
CREAT BLD-MCNC: 0.6 MG/DL (ref 0.4–1)
DEPRECATED RDW RBC AUTO: 43.3 FL (ref 37–54)
ERYTHROCYTE [DISTWIDTH] IN BLOOD BY AUTOMATED COUNT: 13.3 % (ref 12.3–15.4)
GFR SERPL CREATININE-BSD FRML MDRD: 103 ML/MIN/1.73
GLUCOSE BLD-MCNC: 102 MG/DL (ref 65–99)
GLUCOSE UR STRIP-MCNC: NEGATIVE MG/DL
HBA1C MFR BLD: 5.1 % (ref 3.5–5.6)
HCT VFR BLD AUTO: 38.6 % (ref 34–46.6)
HGB BLD-MCNC: 13.2 G/DL (ref 12–15.9)
HGB UR QL STRIP.AUTO: NEGATIVE
INR PPP: 0.92 (ref 0.9–1.1)
KETONES UR QL STRIP: NEGATIVE
LEUKOCYTE ESTERASE UR QL STRIP.AUTO: NEGATIVE
MCH RBC QN AUTO: 31.9 PG (ref 26.6–33)
MCHC RBC AUTO-ENTMCNC: 34.2 G/DL (ref 31.5–35.7)
MCV RBC AUTO: 93.4 FL (ref 79–97)
NITRITE UR QL STRIP: NEGATIVE
PH UR STRIP.AUTO: 6.5 [PH] (ref 5–8)
PLATELET # BLD AUTO: 383 10*3/MM3 (ref 140–450)
PMV BLD AUTO: 8 FL (ref 6–12)
POTASSIUM BLD-SCNC: 3.2 MMOL/L (ref 3.6–5.1)
PROT UR QL STRIP: NEGATIVE
PROTHROMBIN TIME: 9.8 SECONDS (ref 9.6–11.7)
RBC # BLD AUTO: 4.14 10*6/MM3 (ref 3.77–5.28)
RH BLD: NEGATIVE
SODIUM BLD-SCNC: 128 MMOL/L (ref 136–144)
SP GR UR STRIP: <=1.005 (ref 1–1.03)
T&S EXPIRATION DATE: NORMAL
UROBILINOGEN UR QL STRIP: NORMAL
WBC NRBC COR # BLD: 11.7 10*3/MM3 (ref 3.4–10.8)

## 2019-10-07 PROCEDURE — 85610 PROTHROMBIN TIME: CPT | Performed by: ORTHOPAEDIC SURGERY

## 2019-10-07 PROCEDURE — 83036 HEMOGLOBIN GLYCOSYLATED A1C: CPT | Performed by: ORTHOPAEDIC SURGERY

## 2019-10-07 PROCEDURE — 36415 COLL VENOUS BLD VENIPUNCTURE: CPT

## 2019-10-07 PROCEDURE — 85730 THROMBOPLASTIN TIME PARTIAL: CPT | Performed by: ORTHOPAEDIC SURGERY

## 2019-10-07 PROCEDURE — 93005 ELECTROCARDIOGRAM TRACING: CPT

## 2019-10-07 PROCEDURE — 71046 X-RAY EXAM CHEST 2 VIEWS: CPT

## 2019-10-07 PROCEDURE — 86900 BLOOD TYPING SEROLOGIC ABO: CPT | Performed by: ORTHOPAEDIC SURGERY

## 2019-10-07 PROCEDURE — 80048 BASIC METABOLIC PNL TOTAL CA: CPT | Performed by: ORTHOPAEDIC SURGERY

## 2019-10-07 PROCEDURE — 86901 BLOOD TYPING SEROLOGIC RH(D): CPT

## 2019-10-07 PROCEDURE — 87081 CULTURE SCREEN ONLY: CPT | Performed by: ORTHOPAEDIC SURGERY

## 2019-10-07 PROCEDURE — 86900 BLOOD TYPING SEROLOGIC ABO: CPT

## 2019-10-07 PROCEDURE — 85027 COMPLETE CBC AUTOMATED: CPT | Performed by: ORTHOPAEDIC SURGERY

## 2019-10-07 PROCEDURE — 86901 BLOOD TYPING SEROLOGIC RH(D): CPT | Performed by: ORTHOPAEDIC SURGERY

## 2019-10-07 PROCEDURE — 86850 RBC ANTIBODY SCREEN: CPT | Performed by: ORTHOPAEDIC SURGERY

## 2019-10-07 PROCEDURE — 81003 URINALYSIS AUTO W/O SCOPE: CPT | Performed by: ORTHOPAEDIC SURGERY

## 2019-10-07 ASSESSMENT — KOOS JR
KOOS JR SCORE: 44.905
KOOS JR SCORE: 17

## 2019-10-08 ENCOUNTER — TELEPHONE (OUTPATIENT)
Dept: PREADMISSION TESTING | Facility: HOSPITAL | Age: 59
End: 2019-10-08

## 2019-10-08 LAB — MRSA SPEC QL CULT: NORMAL

## 2019-10-08 PROCEDURE — 93010 ELECTROCARDIOGRAM REPORT: CPT | Performed by: INTERNAL MEDICINE

## 2019-10-08 NOTE — TELEPHONE ENCOUNTER
Please note abnormal pre op lab - Na+ = 128; K+ = 3.2. Spoke with Eliazar Maxwell PCP on file to notify. Advise of any further needs or orders for PAT. Thank You.

## 2019-10-10 NOTE — TELEPHONE ENCOUNTER
Unable to reach pt or LM on phones to see if she's aware of need to see MD Re: abnormal labs prior to surgery.  Has pt been notified?  Thanks

## 2019-10-10 NOTE — TELEPHONE ENCOUNTER
I HAVE TRIED TO CALL PT SEVERAL TIMES WITH NO LUCK. HOME # HAS A VOICEMAIL THAT IS FULL, AND THE CELL NUMBER IS ALWAYS BUSY. I WILL CONTINUE TO REACH PT.

## 2019-10-11 NOTE — PAT
Have been trying to get in touch with pt about abn labs.  She needs to see a PCP.  Pt voice mail is full, no answer on her phone.  Eliazar from Dr. Almaraz's office has been trying to reach her as well with no luck.  She will keep trying.

## 2019-10-15 ENCOUNTER — ANESTHESIA EVENT (OUTPATIENT)
Dept: PERIOP | Facility: HOSPITAL | Age: 59
End: 2019-10-15

## 2019-10-16 ENCOUNTER — ANESTHESIA (OUTPATIENT)
Dept: PERIOP | Facility: HOSPITAL | Age: 59
End: 2019-10-16

## 2019-10-16 ENCOUNTER — HOSPITAL ENCOUNTER (OUTPATIENT)
Facility: HOSPITAL | Age: 59
Discharge: HOME HEALTH | End: 2019-10-17
Attending: ORTHOPAEDIC SURGERY | Admitting: ORTHOPAEDIC SURGERY

## 2019-10-16 ENCOUNTER — APPOINTMENT (OUTPATIENT)
Dept: GENERAL RADIOLOGY | Facility: HOSPITAL | Age: 59
End: 2019-10-16

## 2019-10-16 DIAGNOSIS — M17.11 PRIMARY OSTEOARTHRITIS OF RIGHT KNEE: ICD-10-CM

## 2019-10-16 PROCEDURE — 63710000001 ALPRAZOLAM 1 MG TABLET: Performed by: PHYSICIAN ASSISTANT

## 2019-10-16 PROCEDURE — A9270 NON-COVERED ITEM OR SERVICE: HCPCS | Performed by: PHYSICIAN ASSISTANT

## 2019-10-16 PROCEDURE — 25010000002 DEXAMETHASONE PER 1 MG: Performed by: ANESTHESIOLOGY

## 2019-10-16 PROCEDURE — 25010000002 PROPOFOL 10 MG/ML EMULSION: Performed by: ANESTHESIOLOGY

## 2019-10-16 PROCEDURE — 63710000001 ACETAMINOPHEN 500 MG TABLET: Performed by: PHYSICIAN ASSISTANT

## 2019-10-16 PROCEDURE — 25010000002 MORPHINE PER 10 MG: Performed by: PHYSICIAN ASSISTANT

## 2019-10-16 PROCEDURE — 63710000001 OXYCODONE 10 MG TABLET EXTENDED-RELEASE 12 HOUR: Performed by: PHYSICIAN ASSISTANT

## 2019-10-16 PROCEDURE — 63710000001 OXYCODONE 5 MG TABLET: Performed by: PHYSICIAN ASSISTANT

## 2019-10-16 PROCEDURE — 25010000002 FENTANYL CITRATE (PF) 100 MCG/2ML SOLUTION: Performed by: ANESTHESIOLOGY

## 2019-10-16 PROCEDURE — A9270 NON-COVERED ITEM OR SERVICE: HCPCS | Performed by: ORTHOPAEDIC SURGERY

## 2019-10-16 PROCEDURE — 27447 TOTAL KNEE ARTHROPLASTY: CPT | Performed by: ORTHOPAEDIC SURGERY

## 2019-10-16 PROCEDURE — 63710000001 NICOTINE 21 MG/24HR PATCH 24 HOUR: Performed by: PHYSICIAN ASSISTANT

## 2019-10-16 PROCEDURE — 63710000001 ACETAMINOPHEN 500 MG TABLET: Performed by: ORTHOPAEDIC SURGERY

## 2019-10-16 PROCEDURE — C1713 ANCHOR/SCREW BN/BN,TIS/BN: HCPCS | Performed by: ORTHOPAEDIC SURGERY

## 2019-10-16 PROCEDURE — 63710000001 DOCUSATE SODIUM 100 MG CAPSULE: Performed by: PHYSICIAN ASSISTANT

## 2019-10-16 PROCEDURE — 63710000001 POTASSIUM CHLORIDE 20 MEQ TABLET CONTROLLED-RELEASE: Performed by: NURSE PRACTITIONER

## 2019-10-16 PROCEDURE — G0378 HOSPITAL OBSERVATION PER HR: HCPCS

## 2019-10-16 PROCEDURE — 94799 UNLISTED PULMONARY SVC/PX: CPT

## 2019-10-16 PROCEDURE — 63710000001 MELOXICAM 15 MG TABLET: Performed by: ORTHOPAEDIC SURGERY

## 2019-10-16 PROCEDURE — 97116 GAIT TRAINING THERAPY: CPT

## 2019-10-16 PROCEDURE — C9290 INJ, BUPIVACAINE LIPOSOME: HCPCS | Performed by: ORTHOPAEDIC SURGERY

## 2019-10-16 PROCEDURE — C1776 JOINT DEVICE (IMPLANTABLE): HCPCS | Performed by: ORTHOPAEDIC SURGERY

## 2019-10-16 PROCEDURE — 25010000002 DEXAMETHASONE PER 1 MG: Performed by: NURSE ANESTHETIST, CERTIFIED REGISTERED

## 2019-10-16 PROCEDURE — 25010000002 MIDAZOLAM PER 1 MG: Performed by: ANESTHESIOLOGY

## 2019-10-16 PROCEDURE — 63710000001 ESTRADIOL 1 MG TABLET: Performed by: PHYSICIAN ASSISTANT

## 2019-10-16 PROCEDURE — 25010000002 ROPIVACAINE PER 1 MG: Performed by: ORTHOPAEDIC SURGERY

## 2019-10-16 PROCEDURE — A9270 NON-COVERED ITEM OR SERVICE: HCPCS | Performed by: NURSE PRACTITIONER

## 2019-10-16 PROCEDURE — S0260 H&P FOR SURGERY: HCPCS | Performed by: ORTHOPAEDIC SURGERY

## 2019-10-16 PROCEDURE — 63710000001 RIVAROXABAN 10 MG TABLET: Performed by: PHYSICIAN ASSISTANT

## 2019-10-16 PROCEDURE — 25010000002 ONDANSETRON PER 1 MG: Performed by: NURSE ANESTHETIST, CERTIFIED REGISTERED

## 2019-10-16 PROCEDURE — 25010000003 BUPIVACAINE LIPOSOME 1.3 % SUSPENSION: Performed by: ORTHOPAEDIC SURGERY

## 2019-10-16 PROCEDURE — 63710000001 POLYETHYLENE GLYCOL PACK: Performed by: PHYSICIAN ASSISTANT

## 2019-10-16 PROCEDURE — 99204 OFFICE O/P NEW MOD 45 MIN: CPT | Performed by: INTERNAL MEDICINE

## 2019-10-16 PROCEDURE — 63710000001 POVIDONE-IODINE 10 % SOLUTION 118 ML BOTTLE: Performed by: ORTHOPAEDIC SURGERY

## 2019-10-16 PROCEDURE — 73560 X-RAY EXAM OF KNEE 1 OR 2: CPT

## 2019-10-16 PROCEDURE — 63710000001 GABAPENTIN 400 MG CAPSULE: Performed by: PHYSICIAN ASSISTANT

## 2019-10-16 PROCEDURE — 25010000002 LORAZEPAM PER 2 MG: Performed by: NURSE ANESTHETIST, CERTIFIED REGISTERED

## 2019-10-16 PROCEDURE — 97162 PT EVAL MOD COMPLEX 30 MIN: CPT

## 2019-10-16 PROCEDURE — 20985 CPTR-ASST DIR MS PX: CPT | Performed by: ORTHOPAEDIC SURGERY

## 2019-10-16 PROCEDURE — 25010000002 ROPIVACAINE PER 1 MG: Performed by: ANESTHESIOLOGY

## 2019-10-16 PROCEDURE — 25010000002 HYDROMORPHONE PER 4 MG: Performed by: NURSE ANESTHETIST, CERTIFIED REGISTERED

## 2019-10-16 PROCEDURE — 25010000002 PROPOFOL 200 MG/20ML EMULSION: Performed by: ANESTHESIOLOGY

## 2019-10-16 DEVICE — CAP PAT KN VE/TM UPCHRG: Type: IMPLANTABLE DEVICE | Status: FUNCTIONAL

## 2019-10-16 DEVICE — EXT STEM FEM/KN PERSONA TPR 14XPLS30MM: Type: IMPLANTABLE DEVICE | Site: KNEE | Status: FUNCTIONAL

## 2019-10-16 DEVICE — IMPLANTABLE DEVICE: Type: IMPLANTABLE DEVICE | Site: KNEE | Status: FUNCTIONAL

## 2019-10-16 DEVICE — SCRW HEX PERSONA 3.5X3.2X33MM: Type: IMPLANTABLE DEVICE | Status: FUNCTIONAL

## 2019-10-16 DEVICE — STEM TIB/KN PERSONA CMT 5D SZE RT: Type: IMPLANTABLE DEVICE | Site: KNEE | Status: FUNCTIONAL

## 2019-10-16 DEVICE — PAT KN PERSONA VE CRS/LNK CMT 9X35MM: Type: IMPLANTABLE DEVICE | Site: KNEE | Status: FUNCTIONAL

## 2019-10-16 DEVICE — COMP FEM/KN PERSONA CR CMT NRW SZ9 RT: Type: IMPLANTABLE DEVICE | Site: KNEE | Status: FUNCTIONAL

## 2019-10-16 DEVICE — CAP TOTL KN CMT PREMIUM: Type: IMPLANTABLE DEVICE | Status: FUNCTIONAL

## 2019-10-16 DEVICE — CMT BONE REFOBACIN R W/GENT 1X40: Type: IMPLANTABLE DEVICE | Site: KNEE | Status: FUNCTIONAL

## 2019-10-16 DEVICE — CAP BEAR KN VE UPCHRG: Type: IMPLANTABLE DEVICE | Status: FUNCTIONAL

## 2019-10-16 RX ORDER — MELOXICAM 15 MG/1
15 TABLET ORAL DAILY
Status: DISCONTINUED | OUTPATIENT
Start: 2019-10-17 | End: 2019-10-17 | Stop reason: HOSPADM

## 2019-10-16 RX ORDER — ACETAMINOPHEN 500 MG
1000 TABLET ORAL EVERY 8 HOURS
Status: DISCONTINUED | OUTPATIENT
Start: 2019-10-16 | End: 2019-10-17 | Stop reason: HOSPADM

## 2019-10-16 RX ORDER — MIDAZOLAM HYDROCHLORIDE 1 MG/ML
INJECTION INTRAMUSCULAR; INTRAVENOUS
Status: COMPLETED | OUTPATIENT
Start: 2019-10-16 | End: 2019-10-16

## 2019-10-16 RX ORDER — ACETAMINOPHEN 500 MG
1000 TABLET ORAL ONCE
Status: COMPLETED | OUTPATIENT
Start: 2019-10-16 | End: 2019-10-16

## 2019-10-16 RX ORDER — FERROUS SULFATE TAB EC 324 MG (65 MG FE EQUIVALENT) 324 (65 FE) MG
324 TABLET DELAYED RESPONSE ORAL
Status: DISCONTINUED | OUTPATIENT
Start: 2019-10-17 | End: 2019-10-17 | Stop reason: HOSPADM

## 2019-10-16 RX ORDER — ONDANSETRON 2 MG/ML
4 INJECTION INTRAMUSCULAR; INTRAVENOUS ONCE AS NEEDED
Status: DISCONTINUED | OUTPATIENT
Start: 2019-10-16 | End: 2019-10-16 | Stop reason: HOSPADM

## 2019-10-16 RX ORDER — POTASSIUM CHLORIDE 20 MEQ/1
40 TABLET, EXTENDED RELEASE ORAL AS NEEDED
Status: DISCONTINUED | OUTPATIENT
Start: 2019-10-16 | End: 2019-10-17 | Stop reason: HOSPADM

## 2019-10-16 RX ORDER — NALOXONE HCL 0.4 MG/ML
0.4 VIAL (ML) INJECTION
Status: DISCONTINUED | OUTPATIENT
Start: 2019-10-16 | End: 2019-10-17 | Stop reason: HOSPADM

## 2019-10-16 RX ORDER — DEXAMETHASONE SODIUM PHOSPHATE 4 MG/ML
INJECTION, SOLUTION INTRA-ARTICULAR; INTRALESIONAL; INTRAMUSCULAR; INTRAVENOUS; SOFT TISSUE
Status: COMPLETED | OUTPATIENT
Start: 2019-10-16 | End: 2019-10-16

## 2019-10-16 RX ORDER — AMLODIPINE BESYLATE 5 MG/1
10 TABLET ORAL
Status: DISCONTINUED | OUTPATIENT
Start: 2019-10-17 | End: 2019-10-17 | Stop reason: HOSPADM

## 2019-10-16 RX ORDER — SODIUM CHLORIDE, SODIUM LACTATE, POTASSIUM CHLORIDE, CALCIUM CHLORIDE 600; 310; 30; 20 MG/100ML; MG/100ML; MG/100ML; MG/100ML
INJECTION, SOLUTION INTRAVENOUS CONTINUOUS PRN
Status: DISCONTINUED | OUTPATIENT
Start: 2019-10-16 | End: 2019-10-16 | Stop reason: SURG

## 2019-10-16 RX ORDER — GABAPENTIN 300 MG/1
600 CAPSULE ORAL
Status: DISCONTINUED | OUTPATIENT
Start: 2019-10-16 | End: 2019-10-16

## 2019-10-16 RX ORDER — ONDANSETRON 4 MG/1
4 TABLET, FILM COATED ORAL EVERY 6 HOURS PRN
Status: DISCONTINUED | OUTPATIENT
Start: 2019-10-16 | End: 2019-10-17 | Stop reason: HOSPADM

## 2019-10-16 RX ORDER — MORPHINE SULFATE 4 MG/ML
2 INJECTION, SOLUTION INTRAMUSCULAR; INTRAVENOUS
Status: DISCONTINUED | OUTPATIENT
Start: 2019-10-16 | End: 2019-10-16 | Stop reason: HOSPADM

## 2019-10-16 RX ORDER — PROPOFOL 10 MG/ML
INJECTION, EMULSION INTRAVENOUS AS NEEDED
Status: DISCONTINUED | OUTPATIENT
Start: 2019-10-16 | End: 2019-10-16 | Stop reason: SURG

## 2019-10-16 RX ORDER — TRAMADOL HYDROCHLORIDE 50 MG/1
50 TABLET ORAL EVERY 6 HOURS PRN
Status: DISCONTINUED | OUTPATIENT
Start: 2019-10-16 | End: 2019-10-17 | Stop reason: HOSPADM

## 2019-10-16 RX ORDER — SODIUM CHLORIDE 9 MG/ML
100 INJECTION, SOLUTION INTRAVENOUS CONTINUOUS
Status: DISCONTINUED | OUTPATIENT
Start: 2019-10-16 | End: 2019-10-17 | Stop reason: HOSPADM

## 2019-10-16 RX ORDER — DOCUSATE SODIUM 100 MG/1
100 CAPSULE, LIQUID FILLED ORAL 2 TIMES DAILY
Status: DISCONTINUED | OUTPATIENT
Start: 2019-10-16 | End: 2019-10-17 | Stop reason: HOSPADM

## 2019-10-16 RX ORDER — FENTANYL CITRATE 50 UG/ML
INJECTION, SOLUTION INTRAMUSCULAR; INTRAVENOUS AS NEEDED
Status: DISCONTINUED | OUTPATIENT
Start: 2019-10-16 | End: 2019-10-16

## 2019-10-16 RX ORDER — HYDROMORPHONE HCL 110MG/55ML
0.5 PATIENT CONTROLLED ANALGESIA SYRINGE INTRAVENOUS
Status: DISCONTINUED | OUTPATIENT
Start: 2019-10-16 | End: 2019-10-16 | Stop reason: HOSPADM

## 2019-10-16 RX ORDER — PROMETHAZINE HYDROCHLORIDE 25 MG/1
25 TABLET ORAL ONCE AS NEEDED
Status: DISCONTINUED | OUTPATIENT
Start: 2019-10-16 | End: 2019-10-16 | Stop reason: HOSPADM

## 2019-10-16 RX ORDER — PHENYLEPHRINE HCL IN 0.9% NACL 0.5 MG/5ML
.5-3 SYRINGE (ML) INTRAVENOUS
Status: DISCONTINUED | OUTPATIENT
Start: 2019-10-16 | End: 2019-10-16 | Stop reason: HOSPADM

## 2019-10-16 RX ORDER — EPHEDRINE SULFATE 50 MG/ML
5 INJECTION, SOLUTION INTRAVENOUS ONCE AS NEEDED
Status: DISCONTINUED | OUTPATIENT
Start: 2019-10-16 | End: 2019-10-16 | Stop reason: HOSPADM

## 2019-10-16 RX ORDER — NICOTINE 21 MG/24HR
1 PATCH, TRANSDERMAL 24 HOURS TRANSDERMAL EVERY 24 HOURS
Status: DISCONTINUED | OUTPATIENT
Start: 2019-10-16 | End: 2019-10-17 | Stop reason: HOSPADM

## 2019-10-16 RX ORDER — DIPHENHYDRAMINE HCL 25 MG
25 TABLET ORAL EVERY 6 HOURS PRN
Status: DISCONTINUED | OUTPATIENT
Start: 2019-10-16 | End: 2019-10-17 | Stop reason: HOSPADM

## 2019-10-16 RX ORDER — ONDANSETRON 2 MG/ML
INJECTION INTRAMUSCULAR; INTRAVENOUS AS NEEDED
Status: DISCONTINUED | OUTPATIENT
Start: 2019-10-16 | End: 2019-10-16 | Stop reason: SURG

## 2019-10-16 RX ORDER — ALPRAZOLAM 1 MG/1
2 TABLET ORAL 3 TIMES DAILY PRN
Status: DISCONTINUED | OUTPATIENT
Start: 2019-10-16 | End: 2019-10-17 | Stop reason: HOSPADM

## 2019-10-16 RX ORDER — ACETAMINOPHEN 325 MG/1
650 TABLET ORAL ONCE AS NEEDED
Status: DISCONTINUED | OUTPATIENT
Start: 2019-10-16 | End: 2019-10-16 | Stop reason: HOSPADM

## 2019-10-16 RX ORDER — MIDAZOLAM HYDROCHLORIDE 1 MG/ML
INJECTION INTRAMUSCULAR; INTRAVENOUS AS NEEDED
Status: DISCONTINUED | OUTPATIENT
Start: 2019-10-16 | End: 2019-10-16

## 2019-10-16 RX ORDER — IPRATROPIUM BROMIDE AND ALBUTEROL SULFATE 2.5; .5 MG/3ML; MG/3ML
3 SOLUTION RESPIRATORY (INHALATION) ONCE AS NEEDED
Status: DISCONTINUED | OUTPATIENT
Start: 2019-10-16 | End: 2019-10-16 | Stop reason: HOSPADM

## 2019-10-16 RX ORDER — DIPHENHYDRAMINE HYDROCHLORIDE 50 MG/ML
25 INJECTION INTRAMUSCULAR; INTRAVENOUS EVERY 6 HOURS PRN
Status: DISCONTINUED | OUTPATIENT
Start: 2019-10-16 | End: 2019-10-17 | Stop reason: HOSPADM

## 2019-10-16 RX ORDER — CLINDAMYCIN PHOSPHATE 900 MG/50ML
900 INJECTION, SOLUTION INTRAVENOUS ONCE
Status: DISCONTINUED | OUTPATIENT
Start: 2019-10-16 | End: 2019-10-16 | Stop reason: HOSPADM

## 2019-10-16 RX ORDER — OXYCODONE HCL 10 MG/1
10 TABLET, FILM COATED, EXTENDED RELEASE ORAL EVERY 12 HOURS SCHEDULED
Status: DISCONTINUED | OUTPATIENT
Start: 2019-10-16 | End: 2019-10-17 | Stop reason: HOSPADM

## 2019-10-16 RX ORDER — DEXAMETHASONE SODIUM PHOSPHATE 4 MG/ML
INJECTION, SOLUTION INTRA-ARTICULAR; INTRALESIONAL; INTRAMUSCULAR; INTRAVENOUS; SOFT TISSUE AS NEEDED
Status: DISCONTINUED | OUTPATIENT
Start: 2019-10-16 | End: 2019-10-16 | Stop reason: SURG

## 2019-10-16 RX ORDER — MEPERIDINE HYDROCHLORIDE 25 MG/ML
12.5 INJECTION INTRAMUSCULAR; INTRAVENOUS; SUBCUTANEOUS
Status: DISCONTINUED | OUTPATIENT
Start: 2019-10-16 | End: 2019-10-16 | Stop reason: HOSPADM

## 2019-10-16 RX ORDER — BISACODYL 10 MG
10 SUPPOSITORY, RECTAL RECTAL DAILY PRN
Status: DISCONTINUED | OUTPATIENT
Start: 2019-10-16 | End: 2019-10-17 | Stop reason: HOSPADM

## 2019-10-16 RX ORDER — LABETALOL HYDROCHLORIDE 5 MG/ML
5 INJECTION, SOLUTION INTRAVENOUS
Status: DISCONTINUED | OUTPATIENT
Start: 2019-10-16 | End: 2019-10-16 | Stop reason: HOSPADM

## 2019-10-16 RX ORDER — FENTANYL CITRATE 50 UG/ML
INJECTION, SOLUTION INTRAMUSCULAR; INTRAVENOUS
Status: COMPLETED | OUTPATIENT
Start: 2019-10-16 | End: 2019-10-16

## 2019-10-16 RX ORDER — ESTRADIOL 1 MG/1
2 TABLET ORAL DAILY
Status: DISCONTINUED | OUTPATIENT
Start: 2019-10-16 | End: 2019-10-17 | Stop reason: HOSPADM

## 2019-10-16 RX ORDER — SODIUM CHLORIDE 0.9 % (FLUSH) 0.9 %
3-10 SYRINGE (ML) INJECTION AS NEEDED
Status: DISCONTINUED | OUTPATIENT
Start: 2019-10-16 | End: 2019-10-16 | Stop reason: HOSPADM

## 2019-10-16 RX ORDER — SODIUM CHLORIDE 9 MG/ML
9 INJECTION, SOLUTION INTRAVENOUS CONTINUOUS PRN
Status: DISCONTINUED | OUTPATIENT
Start: 2019-10-16 | End: 2019-10-16 | Stop reason: HOSPADM

## 2019-10-16 RX ORDER — PROMETHAZINE HYDROCHLORIDE 25 MG/1
25 TABLET ORAL ONCE AS NEEDED
Status: DISCONTINUED | OUTPATIENT
Start: 2019-10-16 | End: 2019-10-16 | Stop reason: SDUPTHER

## 2019-10-16 RX ORDER — PROMETHAZINE HYDROCHLORIDE 25 MG/1
12.5 TABLET ORAL EVERY 6 HOURS PRN
Status: DISCONTINUED | OUTPATIENT
Start: 2019-10-16 | End: 2019-10-17 | Stop reason: HOSPADM

## 2019-10-16 RX ORDER — MELOXICAM 15 MG/1
15 TABLET ORAL ONCE
Status: COMPLETED | OUTPATIENT
Start: 2019-10-16 | End: 2019-10-16

## 2019-10-16 RX ORDER — PROMETHAZINE HYDROCHLORIDE 25 MG/ML
6.25 INJECTION, SOLUTION INTRAMUSCULAR; INTRAVENOUS ONCE AS NEEDED
Status: DISCONTINUED | OUTPATIENT
Start: 2019-10-16 | End: 2019-10-16 | Stop reason: HOSPADM

## 2019-10-16 RX ORDER — ONDANSETRON 2 MG/ML
4 INJECTION INTRAMUSCULAR; INTRAVENOUS EVERY 6 HOURS PRN
Status: DISCONTINUED | OUTPATIENT
Start: 2019-10-16 | End: 2019-10-17 | Stop reason: HOSPADM

## 2019-10-16 RX ORDER — SODIUM CHLORIDE 0.9 % (FLUSH) 0.9 %
3 SYRINGE (ML) INJECTION EVERY 12 HOURS SCHEDULED
Status: DISCONTINUED | OUTPATIENT
Start: 2019-10-16 | End: 2019-10-16 | Stop reason: HOSPADM

## 2019-10-16 RX ORDER — PROMETHAZINE HYDROCHLORIDE 25 MG/1
25 SUPPOSITORY RECTAL ONCE AS NEEDED
Status: DISCONTINUED | OUTPATIENT
Start: 2019-10-16 | End: 2019-10-16 | Stop reason: HOSPADM

## 2019-10-16 RX ORDER — GABAPENTIN 400 MG/1
800 CAPSULE ORAL 3 TIMES DAILY
Status: DISCONTINUED | OUTPATIENT
Start: 2019-10-16 | End: 2019-10-17 | Stop reason: HOSPADM

## 2019-10-16 RX ORDER — LORAZEPAM 2 MG/ML
1 INJECTION INTRAMUSCULAR ONCE AS NEEDED
Status: COMPLETED | OUTPATIENT
Start: 2019-10-16 | End: 2019-10-16

## 2019-10-16 RX ORDER — OXYCODONE HYDROCHLORIDE 5 MG/1
5 TABLET ORAL EVERY 4 HOURS PRN
Status: DISCONTINUED | OUTPATIENT
Start: 2019-10-16 | End: 2019-10-17 | Stop reason: HOSPADM

## 2019-10-16 RX ORDER — TRAZODONE HYDROCHLORIDE 100 MG/1
100 TABLET ORAL NIGHTLY
Status: DISCONTINUED | OUTPATIENT
Start: 2019-10-16 | End: 2019-10-17 | Stop reason: HOSPADM

## 2019-10-16 RX ORDER — LIDOCAINE HYDROCHLORIDE 20 MG/ML
INJECTION, SOLUTION EPIDURAL; INFILTRATION; INTRACAUDAL; PERINEURAL AS NEEDED
Status: DISCONTINUED | OUTPATIENT
Start: 2019-10-16 | End: 2019-10-16

## 2019-10-16 RX ORDER — ACETAMINOPHEN 650 MG
TABLET, EXTENDED RELEASE ORAL AS NEEDED
Status: DISCONTINUED | OUTPATIENT
Start: 2019-10-16 | End: 2019-10-16 | Stop reason: HOSPADM

## 2019-10-16 RX ORDER — POTASSIUM CHLORIDE 1.5 G/1.77G
40 POWDER, FOR SOLUTION ORAL AS NEEDED
Status: DISCONTINUED | OUTPATIENT
Start: 2019-10-16 | End: 2019-10-17 | Stop reason: HOSPADM

## 2019-10-16 RX ORDER — CALCIUM GLUCONATE 20 MG/ML
1 INJECTION, SOLUTION INTRAVENOUS AS NEEDED
Status: DISCONTINUED | OUTPATIENT
Start: 2019-10-16 | End: 2019-10-17 | Stop reason: HOSPADM

## 2019-10-16 RX ORDER — MORPHINE SULFATE 4 MG/ML
4 INJECTION, SOLUTION INTRAMUSCULAR; INTRAVENOUS
Status: DISCONTINUED | OUTPATIENT
Start: 2019-10-16 | End: 2019-10-17 | Stop reason: HOSPADM

## 2019-10-16 RX ORDER — PROMETHAZINE HYDROCHLORIDE 25 MG/ML
12.5 INJECTION, SOLUTION INTRAMUSCULAR; INTRAVENOUS ONCE AS NEEDED
Status: DISCONTINUED | OUTPATIENT
Start: 2019-10-16 | End: 2019-10-16 | Stop reason: HOSPADM

## 2019-10-16 RX ORDER — ACETAMINOPHEN 650 MG/1
650 SUPPOSITORY RECTAL ONCE AS NEEDED
Status: DISCONTINUED | OUTPATIENT
Start: 2019-10-16 | End: 2019-10-16 | Stop reason: HOSPADM

## 2019-10-16 RX ORDER — BACLOFEN 10 MG/1
10 TABLET ORAL 2 TIMES DAILY PRN
Status: DISCONTINUED | OUTPATIENT
Start: 2019-10-16 | End: 2019-10-17 | Stop reason: HOSPADM

## 2019-10-16 RX ORDER — PROMETHAZINE HYDROCHLORIDE 25 MG/1
25 SUPPOSITORY RECTAL ONCE AS NEEDED
Status: DISCONTINUED | OUTPATIENT
Start: 2019-10-16 | End: 2019-10-16 | Stop reason: SDUPTHER

## 2019-10-16 RX ORDER — HYDRALAZINE HYDROCHLORIDE 20 MG/ML
5 INJECTION INTRAMUSCULAR; INTRAVENOUS
Status: DISCONTINUED | OUTPATIENT
Start: 2019-10-16 | End: 2019-10-16 | Stop reason: HOSPADM

## 2019-10-16 RX ORDER — PANTOPRAZOLE SODIUM 40 MG/1
40 TABLET, DELAYED RELEASE ORAL EVERY MORNING
Status: DISCONTINUED | OUTPATIENT
Start: 2019-10-17 | End: 2019-10-17 | Stop reason: HOSPADM

## 2019-10-16 RX ORDER — POTASSIUM CHLORIDE 7.45 MG/ML
10 INJECTION INTRAVENOUS
Status: DISCONTINUED | OUTPATIENT
Start: 2019-10-16 | End: 2019-10-17 | Stop reason: HOSPADM

## 2019-10-16 RX ORDER — KETAMINE HYDROCHLORIDE 10 MG/ML
INJECTION INTRAMUSCULAR; INTRAVENOUS AS NEEDED
Status: DISCONTINUED | OUTPATIENT
Start: 2019-10-16 | End: 2019-10-16 | Stop reason: SURG

## 2019-10-16 RX ORDER — ROPIVACAINE HYDROCHLORIDE 5 MG/ML
INJECTION, SOLUTION EPIDURAL; INFILTRATION; PERINEURAL
Status: COMPLETED | OUTPATIENT
Start: 2019-10-16 | End: 2019-10-16

## 2019-10-16 RX ORDER — ROPIVACAINE HYDROCHLORIDE 5 MG/ML
INJECTION, SOLUTION EPIDURAL; INFILTRATION; PERINEURAL AS NEEDED
Status: DISCONTINUED | OUTPATIENT
Start: 2019-10-16 | End: 2019-10-16 | Stop reason: HOSPADM

## 2019-10-16 RX ORDER — OXYCODONE HCL 10 MG/1
10 TABLET, FILM COATED, EXTENDED RELEASE ORAL
Status: COMPLETED | OUTPATIENT
Start: 2019-10-16 | End: 2019-10-16

## 2019-10-16 RX ORDER — CLINDAMYCIN PHOSPHATE 900 MG/50ML
900 INJECTION, SOLUTION INTRAVENOUS EVERY 8 HOURS
Status: COMPLETED | OUTPATIENT
Start: 2019-10-16 | End: 2019-10-17

## 2019-10-16 RX ADMIN — SODIUM CHLORIDE 9 ML/HR: 900 INJECTION, SOLUTION INTRAVENOUS at 07:52

## 2019-10-16 RX ADMIN — SODIUM CHLORIDE 100 ML/HR: 900 INJECTION, SOLUTION INTRAVENOUS at 18:18

## 2019-10-16 RX ADMIN — NICOTINE 1 PATCH: 21 PATCH TRANSDERMAL at 18:03

## 2019-10-16 RX ADMIN — KETAMINE HYDROCHLORIDE 50 MG: 10 INJECTION INTRAMUSCULAR; INTRAVENOUS at 09:07

## 2019-10-16 RX ADMIN — SODIUM CHLORIDE, POTASSIUM CHLORIDE, SODIUM LACTATE AND CALCIUM CHLORIDE 500 ML: 600; 310; 30; 20 INJECTION, SOLUTION INTRAVENOUS at 07:52

## 2019-10-16 RX ADMIN — PROPOFOL 100 MCG/KG/MIN: 10 INJECTION, EMULSION INTRAVENOUS at 09:08

## 2019-10-16 RX ADMIN — OXYCODONE HYDROCHLORIDE 10 MG: 10 TABLET, FILM COATED, EXTENDED RELEASE ORAL at 07:53

## 2019-10-16 RX ADMIN — PROPOFOL 200 MG: 10 INJECTION, EMULSION INTRAVENOUS at 09:05

## 2019-10-16 RX ADMIN — MIDAZOLAM 2 MG: 1 INJECTION INTRAMUSCULAR; INTRAVENOUS at 08:22

## 2019-10-16 RX ADMIN — HYDROMORPHONE HYDROCHLORIDE 1 MG: 2 INJECTION, SOLUTION INTRAMUSCULAR; INTRAVENOUS; SUBCUTANEOUS at 13:41

## 2019-10-16 RX ADMIN — FENTANYL CITRATE 50 MCG: 50 INJECTION, SOLUTION INTRAMUSCULAR; INTRAVENOUS at 09:50

## 2019-10-16 RX ADMIN — ALPRAZOLAM 2 MG: 1 TABLET ORAL at 18:03

## 2019-10-16 RX ADMIN — GABAPENTIN 800 MG: 400 CAPSULE ORAL at 21:23

## 2019-10-16 RX ADMIN — RIVAROXABAN 10 MG: 10 TABLET, FILM COATED ORAL at 21:22

## 2019-10-16 RX ADMIN — MORPHINE SULFATE 4 MG: 4 INJECTION INTRAVENOUS at 18:04

## 2019-10-16 RX ADMIN — FENTANYL CITRATE 100 MCG: 50 INJECTION, SOLUTION INTRAMUSCULAR; INTRAVENOUS at 09:05

## 2019-10-16 RX ADMIN — FENTANYL CITRATE 100 MCG: 50 INJECTION, SOLUTION INTRAMUSCULAR; INTRAVENOUS at 08:22

## 2019-10-16 RX ADMIN — ROPIVACAINE HYDROCHLORIDE 20 ML: 5 INJECTION, SOLUTION EPIDURAL; INFILTRATION; PERINEURAL at 08:22

## 2019-10-16 RX ADMIN — DEXAMETHASONE SODIUM PHOSPHATE 4 MG: 4 INJECTION, SOLUTION INTRAMUSCULAR; INTRAVENOUS at 08:22

## 2019-10-16 RX ADMIN — ONDANSETRON 4 MG: 2 INJECTION INTRAMUSCULAR; INTRAVENOUS at 10:30

## 2019-10-16 RX ADMIN — PROPOFOL: 10 INJECTION, EMULSION INTRAVENOUS at 10:32

## 2019-10-16 RX ADMIN — DEXAMETHASONE SODIUM PHOSPHATE 4 MG: 4 INJECTION, SOLUTION INTRAMUSCULAR; INTRAVENOUS at 09:43

## 2019-10-16 RX ADMIN — PROPOFOL 30 MG: 10 INJECTION, EMULSION INTRAVENOUS at 10:00

## 2019-10-16 RX ADMIN — MEPERIDINE HYDROCHLORIDE 25 MG: 25 INJECTION INTRAMUSCULAR; INTRAVENOUS; SUBCUTANEOUS at 15:05

## 2019-10-16 RX ADMIN — OXYCODONE HYDROCHLORIDE 5 MG: 5 TABLET ORAL at 21:23

## 2019-10-16 RX ADMIN — DOCUSATE SODIUM 100 MG: 100 CAPSULE ORAL at 21:23

## 2019-10-16 RX ADMIN — LORAZEPAM 1 MG: 2 INJECTION INTRAMUSCULAR; INTRAVENOUS at 14:20

## 2019-10-16 RX ADMIN — ACETAMINOPHEN 1000 MG: 500 TABLET, FILM COATED ORAL at 07:53

## 2019-10-16 RX ADMIN — OXYCODONE HYDROCHLORIDE 10 MG: 10 TABLET, FILM COATED, EXTENDED RELEASE ORAL at 21:23

## 2019-10-16 RX ADMIN — ACETAMINOPHEN 1000 MG: 500 TABLET, FILM COATED ORAL at 18:03

## 2019-10-16 RX ADMIN — MELOXICAM 15 MG: 15 TABLET ORAL at 07:53

## 2019-10-16 RX ADMIN — POLYETHYLENE GLYCOL 3350 17 G: 17 POWDER, FOR SOLUTION ORAL at 18:05

## 2019-10-16 RX ADMIN — MIDAZOLAM 2 MG: 1 INJECTION INTRAMUSCULAR; INTRAVENOUS at 09:05

## 2019-10-16 RX ADMIN — POTASSIUM CHLORIDE 40 MEQ: 1500 TABLET, EXTENDED RELEASE ORAL at 21:23

## 2019-10-16 RX ADMIN — HYDROMORPHONE HYDROCHLORIDE 1 MG: 2 INJECTION, SOLUTION INTRAMUSCULAR; INTRAVENOUS; SUBCUTANEOUS at 11:18

## 2019-10-16 RX ADMIN — SODIUM CHLORIDE, SODIUM LACTATE, POTASSIUM CHLORIDE, AND CALCIUM CHLORIDE: .6; .31; .03; .02 INJECTION, SOLUTION INTRAVENOUS at 09:07

## 2019-10-16 RX ADMIN — ESTRADIOL 2 MG: 1 TABLET ORAL at 18:03

## 2019-10-16 RX ADMIN — CLINDAMYCIN PHOSPHATE 900 MG: 900 INJECTION, SOLUTION INTRAVENOUS at 18:03

## 2019-10-16 NOTE — ANESTHESIA PROCEDURE NOTES
Airway  Date/Time: 10/16/2019 9:20 AM  Airway not difficult    General Information and Staff    Patient location during procedure: OR    Indications and Patient Condition  Indications for airway management: airway protection    Preoxygenated: yes  MILS maintained throughout  Mask difficulty assessment: 1 - vent by mask    Final Airway Details  Final airway type: supraglottic airway      Successful airway: LMA  Size 4    Number of attempts at approach: 1  Assessment: lips, teeth, and gum same as pre-op and atraumatic intubation

## 2019-10-16 NOTE — ANESTHESIA PROCEDURE NOTES
Peripheral Block      Patient reassessed immediately prior to procedure    Patient location during procedure: pre-op  Start time: 10/16/2019 8:00 AM  Stop time: 10/16/2019 8:15 AM  Performed by  Anesthesiologist: Terry Linares MD  Preanesthetic Checklist  Completed: patient identified, site marked, surgical consent, pre-op evaluation, timeout performed, IV checked, risks and benefits discussed and monitors and equipment checked  Prep:  Pt Position: supine  Sterile barriers:cap, gloves, mask and sterile barriers  Prep: ChloraPrep  Patient monitoring: blood pressure monitoring, continuous pulse oximetry and EKG  Procedure  Sedation:yes  Performed under: local infiltration  Guidance:ultrasound guided  ULTRASOUND INTERPRETATION. Using ultrasound guidance a 21 G gauge needle was placed in close proximity to the nerve, at which point, under ultrasound guidance anesthetic was injected in the area of the nerve and spread of the anesthesia was seen on ultrasound in close proximity thereto.  There were no abnormalities seen on ultrasound; a digital image was taken; and the patient tolerated the procedure with no complications. Images:still images obtained, printed/placed on chart    Laterality:right  Block Type:adductor canal block  Injection Technique:single-shotResistance on Injection: none  Sedation medications used: midazolam (VERSED) injection, 2 mg  fentaNYL citrate (PF) (SUBLIMAZE) injection, 100 mcg  Medications Used: dexamethasone (DECADRON) injection, 4 mg  ropivacaine (NAROPIN) 0.5 % injection, 20 mL      Post Assessment  Injection Assessment: negative aspiration for heme, no paresthesia on injection and incremental injection  Patient Tolerance:comfortable throughout block  Complications:no

## 2019-10-16 NOTE — ANESTHESIA POSTPROCEDURE EVALUATION
Patient: Seda Urbina    Procedure Summary     Date:  10/16/19 Room / Location:  [facility] OR 12 / [facility] MAIN OR    Anesthesia Start:  0902 Anesthesia Stop:  1048    Procedure:  TOTAL KNEE ARTHROPLASTY (Right Knee) Diagnosis:       Primary osteoarthritis of right knee      (Primary osteoarthritis of right knee [M17.11])    Surgeon:  Meño Almaraz MD Provider:  Krystal Bui MD    Anesthesia Type:  general with block ASA Status:  2          Anesthesia Type: general with block  Last vitals  BP   134/79 (10/16/19 1148)   Temp   97 °F (36.1 °C) (10/16/19 1048)   Pulse   68 (10/16/19 1148)   Resp   9 (10/16/19 1148)     SpO2   93 % (10/16/19 1148)     Post Anesthesia Care and Evaluation    Patient location during evaluation: PACU  Patient participation: complete - patient participated  Level of consciousness: awake  Pain score: 0 (See nurse's notes for pain score)  Pain management: adequate  Airway patency: patent  Anesthetic complications: No anesthetic complications  PONV Status: none  Cardiovascular status: acceptable  Respiratory status: acceptable  Hydration status: acceptable    Comments: Patient seen and examined postoperatively; vital signs stable; SpO2 greater than or equal to 90%; cardiopulmonary status stable; nausea/vomiting adequately controlled; pain adequately controlled; no apparent anesthesia complications; patient discharged from anesthesia care when discharge criteria were met

## 2019-10-16 NOTE — THERAPY EVALUATION
Acute Care - Physical Therapy Initial Evaluation   [facility]     Patient Name: Seda Urbina  : 1960  MRN: [id number]  Today's Date: 10/16/2019                Admit Date: 10/16/2019    Visit Dx:     ICD-10-CM ICD-9-CM   1. Primary osteoarthritis of right knee M17.11 715.16     Patient Active Problem List   Diagnosis   • Knee pain, bilateral   • Primary osteoarthritis of right knee   • Status post total knee replacement, left   • Tear of deltoid ligament of ankle   • Dislocation of right knee   • Acute pain of right knee   • Seizure-like activity (CMS/HCC)   • History of total knee arthroplasty, left   • Acute medial meniscus tear of right knee   • Chronic pain of left knee     Past Medical History:   Diagnosis Date   • Anxiety    • Depression    • GERD (gastroesophageal reflux disease)    • Hypertension    • Insomnia    • Knee swelling    • Muscle spasms of both lower extremities    • Panic attacks    • Urinary incontinence      Past Surgical History:   Procedure Laterality Date   • ANKLE SURGERY Left    • HAND SURGERY Right    • JOINT REPLACEMENT      7 knee revision surgery left knee    • KNEE SURGERY Left         PT ASSESSMENT (last 12 hours)      Physical Therapy Evaluation     Row Name 10/16/19 1641          PT Evaluation Time/Intention    Document Type  evaluation  -SS     Mode of Treatment  physical therapy  -SS     Comment  utilized RW intermittently at home. Pt is very tangential in her speech, anxious and talkative. Requires constant redirection  -SS     Row Name 10/16/19 9428          General Information    Patient/Family Observations  seated in bed with polar alina in place, IV, supp O2;  present  -SS     Prior Level of Function  independent:  -SS     Equipment Currently Used at Home  walker, rolling  -SS     Pertinent History of Current Functional Problem  58 y/o F s/p R TKA per Dr. Almaraz POD 0. History of several surgeries L knee.   -SS     Existing Precautions/Restrictions  fall   -     Row Name 10/16/19 1641          Relationship/Environment    Lives With  significant other  -     Row Name 10/16/19 1641          Resource/Environmental Concerns    Current Living Arrangements  home/apartment/condo  -     Row Name 10/16/19 1641          Living Environment    Living Arrangements  mobile home  -     Home Accessibility  stairs to enter home  -     Living Environment Comment  has hand rail  -     Row Name 10/16/19 1641          Home Main Entrance    Number of Stairs, Main Entrance  three  -     Row Name 10/16/19 1641          Cognitive Assessment/Intervention- PT/OT    Orientation Status (Cognition)  oriented x 4  -     Row Name 10/16/19 1641          Bed Mobility Assessment/Treatment    Bed Mobility Assessment/Treatment  bed mobility (all) activities  -     Sharp Level (Bed Mobility)  independent  -     Row Name 10/16/19 1641          Transfer Assessment/Treatment    Transfer Assessment/Treatment  sit-stand transfer  -     Sit-Stand [place] (Transfers)  contact guard  -     Row Name 10/16/19 1641          Sit-Stand Transfer    Assistive Device (Sit-Stand Transfers)  walker, front-wheeled  -     Row Name 10/16/19 1641          Gait/Stairs Assessment/Training    [place] Level (Gait)  contact guard  -     Assistive Device (Gait)  walker, front-wheeled  -     Distance in Feet (Gait)  40  -SS     Comment (Gait/Stairs)  decreased knee flexion, decreased gait speed, frequently distracted and requires redirection  -     Row Name 10/16/19 1641          General ROM    GENERAL ROM COMMENTS  R knee 0-90   -     Row Name 10/16/19 1641          MMT (Manual Muscle Testing)    General MMT Comments  >3/5 B LEs  -     Row Name 10/16/19 1641          Sensory Assessment/Intervention    Sensory General Assessment  no sensation deficits identified  -     Row Name 10/16/19 1641          Pain Assessment    Additional Documentation  Pain Scale: FACES  Pre/Post-Treatment (Group)  -SS     Row Name 10/16/19 1641          Pain Scale: FACES Pre/Post-Treatment    Pain: FACES Scale, Pretreatment  0-->no hurt  -SS     Pain: FACES Scale, Post-Treatment  2-->hurts little bit  -SS     Row Name             Wound 10/16/19 0953 Right knee Incision    Wound - Properties Group Date first assessed: 10/16/19  -BS Time first assessed: 0953  -BS Side: Right  -BS Location: knee  -BS Primary Wound Type: Incision  -BS    Row Name 10/16/19 1641          Plan of Care Review    Plan of Care Reviewed With  patient  -SS     Row Name 10/16/19 1641          Physical Therapy Clinical Impression    Criteria for Skilled Interventions Met (PT Clinical Impression)  yes;treatment indicated  -SS     Pathology/Pathophysiology Noted (Describe Specifically for Each System)  musculoskeletal  -SS     Impairments Found (describe specific impairments)  gait, locomotion, and balance;joint integrity and mobility;motor function  -SS     Rehab Potential (PT Clinical Summary)  good, to achieve stated therapy goals  -SS     Predicted Duration of Therapy (PT)  until d/c from F  -SS     Row Name 10/16/19 1641          Vital Signs    Intra Systolic BP Rehab  137  -SS     Intra Treatment Diastolic BP  75  -SS     Row Name 10/16/19 1641          Physical Therapy Goals    Bed Mobility Goal Selection (PT)  bed mobility, PT goal 1  -SS     Transfer Goal Selection (PT)  transfer, PT goal 1  -SS     Gait Training Goal Selection (PT)  gait training, PT goal 1  -SS     Row Name 10/16/19 1641          Bed Mobility Goal 1 (PT)    Activity/Assistive Device (Bed Mobility Goal 1, PT)  bed mobility activities, all  -SS     [place] Level/Cues Needed (Bed Mobility Goal 1, PT)  conditional independence  -SS     Time Frame (Bed Mobility Goal 1, PT)  long term goal (LTG);2 weeks  -     Row Name 10/16/19 1641          Transfer Goal 1 (PT)    Activity/Assistive Device (Transfer Goal 1, PT)  transfers, all  -SS     [place]  Level/Cues Needed (Transfer Goal 1, PT)  conditional independence  -SS     Time Frame (Transfer Goal 1, PT)  long term goal (LTG);2 weeks  -     Row Name 10/16/19 1641          Gait Training Goal 1 (PT)    Activity/Assistive Device (Gait Training Goal 1, PT)  gait (walking locomotion);walker, rolling  -     [place] Level (Gait Training Goal 1, PT)  conditional independence  -SS     Distance (Gait Goal 1, PT)  150  -SS     Time Frame (Gait Training Goal 1, PT)  long term goal (LTG);2 weeks  -     Row Name 10/16/19 1641          Patient Education Goal (PT)    Activity (Patient Education Goal, PT)  HEP  -SS     [place]/Cues/Accuracy (Memory Goal 2, PT)  demonstrates adequately;independent;verbalizes understanding  -SS     Time Frame (Patient Education Goal, PT)  long term goal (LTG);2 weeks  -       User Key  (r) = Recorded By, (t) = Taken By, (c) = Cosigned By    Initials Name Provider Type     Lora Benton, PT Physical Therapist    Hermelindo Gutierrez RN Registered Nurse        Physical Therapy Education     Title: PT OT SLP Therapies (Done)     Topic: Physical Therapy (Done)     Point: Mobility training (Done)     Learning Progress Summary           Patient Acceptance, E, VU by  at 10/16/2019  5:25 PM                               User Key     Initials Effective Dates Name Provider Type [facility] 06/19/19 -  Lora Benton, PT Physical Therapist PT              PT Recommendation and Plan  Anticipated Discharge Disposition (PT): home with home health  Planned Therapy Interventions (PT Eval): balance training, bed mobility training, gait training, home exercise program, patient/family education, stair training, strengthening, stretching, transfer training, ROM (range of motion)  Therapy Frequency (PT Clinical Impression): 2 times/day  Outcome Summary/Treatment Plan (PT)  Anticipated Discharge Disposition (PT): home with home health  Plan of Care Reviewed With: patient  Outcome  Summary: 58 y/o F s/p R TKA per Dr. Almaraz POD 0. History of several surgeries L knee. Pt requires constant redirection, is impulsive, anxious and talkative. Pt tolerates ambulation, bed mobility and transfers well. She req CGA for functional mobility. Her R knee ROM is 0-90. She will benefit from HHPT upon d/c.      Time Calculation:   PT Charges     Row Name 10/16/19 1719             Time Calculation    Start Time  1641  -SS      Stop Time  1710  -SS      Time Calculation (min)  29 min  -SS      PT Received On  10/16/19  -      PT - Next Appointment  10/17/19  -      PT Goal Re-Cert Due Date  10/30/19  -         Time Calculation- PT    Total Timed Code Minutes- PT  10 minute(s)  -        User Key  (r) = Recorded By, (t) = Taken By, (c) = Cosigned By    Initials Name Provider Type     Lora Benton, PT Physical Therapist        Therapy Charges for Today     Code Description Service Date Service Provider Modifiers Qty    39185545754 HC PT EVAL MOD COMPLEXITY 4 10/16/2019 Lora Benton, PT GP 1    [id number]  GAIT TRAINING EA 15 MIN 10/16/2019 Lora Benton, PT GP 1                 Lora Benton PT  10/16/2019

## 2019-10-16 NOTE — OP NOTE
TOTAL KNEE ARTHROPLASTY OPERATIVE     PATIENT NAME:Seda Urbina     YOB: 1960     ATTENDING PHYSICIAN: Meño Almaraz MD     DATE OF PROCEDURE: 10/16/2019     PREOPERATIVE DIAGNOSIS: Severe degenerative osteoarthritis, right knee.    POSTOPERATIVE DIAGNOSIS: Post-Op Diagnosis Codes:     * Primary osteoarthritis of right knee [M17.11]    PRINCIPAL DIAGNOSIS: Primary osteoarthritis right knee.    PROCEDURE: Right total knee arthroplasty.    SURGEON:  Meño Almaraz M.D.    ASSISTANT: Paul Laura, first assist.    ANESTHESIOLOGIST: Dr. Marisa Bui MD.    ANESTHESIA: Adductor canal block for postop pain control followed by general anesthesia.    POSITION: Supine on the operating table.    DRAINS: None    COMPLICATIONS: None    ESTIMATED BLOOD LOSS: 200ml    IMPLANT USED: Natali Persona total knee replacement system, #9 narrow posterior cruciate retaining femoral component, number E tibia with a 30mm intramedullary stem, 11mm thick ultraconstrained Vitamin E impregnated polyethylene insert, #35 patella anchored into position using [place] antibiotic impregnated bone cement.     SPECIMENS: * No orders in the log *        INDICATION: The patient has been having very significant pain and discomfort in the knee.  We had scheduled the patient for total knee replacement after all forms of conservative nonoperative care had failed to provide adequate relief of symptoms.  Patient understood all the risks and benefits which were discussed in great detail including the possibility of death, infection, myocardial infarction, DVT, pulmonary embolism, stiffness of the knee, wound infection and breakdown, possibility of revision surgery, neurovascular compromise, pneumonia, pulmonary embolism      DETAILS OF PROCEDURE: Surgical timeout was called. Operative extremity was correctly identified in the operating room suite. Extremity was prepped and draped in a standard fashion. Patient was administered  antibiotics per the SCIP protocol.  Patient was placed under appropriate anesthetic.      Patient was administered IV antibiotics per SCIP protocol and hospital policy. The patient’s operative extremity was correctly identified in the operating room suite. The patient was placed under appropriate anesthesia. Tourniquet was applied. The leg was prepped and draped in a standard fashion.  An anterior approach was performed and a quad sparing, subvastus approach was carried out. The patellofemoral ligament was resected. Medial soft tissue release was carried out on the medial face of the tibia to balance the soft tissues to the MCL. Tibial Osteophytes were resected. Severe bone-on-bone appearance was noted. Synovectomy was carried out. The Synovium was thickened and hypertrophic. The PCL and MCL were carefully protected throughout the entire procedure. The distal femur was mapped. A 10 mm thick cut was made off the distal femur. A measuring guide was used and #9 posterior cruciate retaining femoral component jig was found to be the best fit. Anterior, posterior and chamfer cuts were created. Care was taken to prevent a distal femoral notch. The proximal tibia was then mapped with navigation. 4 mm of the bone was resected off the medial tibial plateau.  A number E tibia was found to be the best fit with good cortical rim fit on the proximal tibial metaphysis.  A trial reduction was carried out. Rotation and orientation of the components were carefully marked. Flexion and extension gaps were checked and found to be symmetric. The stability of the components was checked in full extension, mid- flexion and full flexion.The patella was everted, it was measured and cut.  The #35 patella was found to be the best fit.  Patellar Tracking was excellent. A Lateral release was not deemed necessary. Femoral lug holes were drilled. The Proximal Tibia was die punched. Patellar anchor holes were drilled.    The cancellous bone was  lavaged with a Pulse lavage  and dried. Cement was mixed on the back table. Components were cemented into position sequentially, starting with the # E tibial component and going to the #9 narrow posterior cruciate retaining femur and then the #35 patella. The excess amounts of bone cement were removed from the bone-metal interface. Trial reduction was carried out once the cement was fully cured. A 11 mm X three tibial polyethylene insert was then locked into position on the tibial tray. Wound was lavaged with Bacitracin irrigating solution. Tourniquet was deflated, hemostasis achieved. An analgesic cocktail was injected intra-articularly into the joint capsule and ligamentous insertions on bone for post op pain relief. The arthrotomy was repaired in 60 degrees of flexion. Subcutaneous sutures were applied. Occlusive dressing was applied. No complications were encountered. Sponge count and needle count were correct. The patient was reversed from anesthesia and taken from the operating room to the recovery room in stable condition. I discussed the satisfactory performance of this procedure with the patient’s family and answered all questions for them.       Meño Almaraz MD  10/16/2019  10:49 AM

## 2019-10-16 NOTE — H&P
History & Physical       Patient: Seda Urbina    Date of Admission: 10/16/2019  6:17 AM    YOB: 1960    Medical Record Number: [id number]    Attending Physician: Meño Almaraz MD        Chief Complaints: Primary osteoarthritis of right knee [M17.11]  Chronic pain of left knee [M25.562, G89.29]      History of Present Illness: 59 y.o. female presents with Primary osteoarthritis of right knee [M17.11]  Chronic pain of left knee [M25.562, G89.29]. Onset of symptoms was slowly progressive and gradually worse over the past 3 to 4 years.  Symptoms are associated with pain and discomfort on the medial aspect of the right knee.  Symptoms are aggravated by deep flexion and walking up and down the steps..   Symptoms improve with anti-inflammatory medication and use of a brace.. Patient is now being admitted to the services of Meño Almaraz MD for further evaluation and treatment.  She has had a prior left knee replacement surgery along with multiple episodes of revision and continues to have a lot of pain with her left knee as well as her lumbar spine.       Allergies   Allergen Reactions   • Keflex [Cephalexin] Swelling         Home Medications:  Medications Prior to Admission   Medication Sig Dispense Refill Last Dose   • ALPRAZolam (XANAX) 0.25 MG tablet Take 2 mg by mouth 3 (Three) Times a Day As Needed for Anxiety.   10/16/2019 at Unknown time   • amLODIPine (NORVASC) 10 MG tablet 1 tablet once daily  6 10/16/2019 at Unknown time   • baclofen (LIORESAL) 10 MG tablet TK 1 T PO BID PRN  1 Past Week at Unknown time   • estradiol (ESTRACE) 0.5 MG tablet Takes 2 mg once daily  0 10/15/2019 at Unknown time   • gabapentin (NEURONTIN) 800 MG tablet Take 800 mg by mouth 3 (Three) Times a Day.   10/16/2019 at Unknown time   • losartan-hydrochlorothiazide (HYZAAR) 100-25 MG per tablet Take 1 tablet by mouth Daily.   10/15/2019 at Unknown time   • omeprazole (PriLOSEC) 20 MG capsule 1 tablet once daily  0  10/16/2019 at Unknown time   • oxybutynin XL (DITROPAN XL) 15 MG 24 hr tablet 15 mg Daily.  1 10/16/2019 at Unknown time   • traZODone (DESYREL) 100 MG tablet TK 2 TS PO QHS prn  2 Past Week at Unknown time       Current Medications:  Scheduled Meds:  acetaminophen 1,000 mg Oral Once   clindamycin 900 mg Intravenous Once   gabapentin 600 mg Oral 90 Min Pre-Op   lactated ringers 500 mL Intravenous Once   meloxicam 15 mg Oral Once   oxyCODONE 10 mg Oral 90 Min Pre-Op   sodium chloride 3 mL Intravenous Q12H   tranexamic acid 1,000 mg Intravenous Once     Continuous Infusions:  sodium chloride 9 mL/hr     PRN Meds:.sodium chloride  •  sodium chloride       Past Medical History:   Diagnosis Date   • Anxiety    • Depression    • GERD (gastroesophageal reflux disease)    • Hypertension    • Insomnia    • Knee swelling    • Muscle spasms of both lower extremities    • Panic attacks    • Urinary incontinence         Past Surgical History:   Procedure Laterality Date   • ANKLE SURGERY Left 2003   • HAND SURGERY Right    • JOINT REPLACEMENT      7 knee revision surgery left knee    • KNEE SURGERY Left 2005        Social History     Occupational History   • Not on file   Tobacco Use   • Smoking status: Current Every Day Smoker     Packs/day: 1.00     Types: Cigarettes   • Smokeless tobacco: Never Used   • Tobacco comment: Using patches attempting to quit   Substance and Sexual Activity   • Alcohol use: Yes     Alcohol/week: 2.4 oz     Types: 4 Cans of beer per week     Comment: weekly   • Drug use: No   • Sexual activity: Defer    Social History     Social History Narrative   • Not on file        Family History   Problem Relation Age of Onset   • Deep vein thrombosis Other    • Hypertension Other          Review of Systems:   HEENT: Patient denies any headaches, vision changes, change in hearing, or tinnitus, Patient denies any rhinorrhea,epistaxis, sinus pain, mouth or dental problems, sore throat or hoarseness, or  dysphagia  Pulmonary: Patient denies any cough, congestion, SOA, or wheezing  Cardiovascular: Patient denies any chest pain, dyspnea, palpitations, weakness, intolerance of exercise, varicosities, swelling of extremities, known murmur  Gastrointestinal:  Patient denies nausea, vomiting, diarrhea, constipation, loss  of appetite, change in appetite, dysphagia, gas, heartburn, melena, change in bowel habits, use of laxatives or other drugs to alter the function of the gastrointestinal tract.  Genital/Urinary: Patient denies dysuria, change in color of urine, change in frequency of urination, pain with urgency, incontinence, retention, or nocturia.  Musculoskeletal: Patient denies increased warmth; redness; or swelling of joints; limitation of function; deformity; crepitation: pain in a joint or an extremity, the neck, or the back, especially with movement.  Neurological: Patient denies dizziness, tremor, ataxia, difficulty in speaking, change in speech, paresthesia, loss of sensation, seizures, syncope, changes in memory.  Endocrine system: Patient denies tremors, palpitations, intolerance of heat or cold, polyuria, polydipsia, polyphagia, diaphoresis, exophthalmos, or goiter.  Psychological: Patient denies thoughts/plans or harming self or other; depression,  insomnia, night terrors, lennie, memory loss, disorientation.  Skin: Patient denies any bruising, rashes, discoloration, pruritus, wounds, ulcers, decubiti, changes in the hair or nails  Hematopoietic: Patient denies history of spontaneous or excessive bleeding, epistaxis, hematuria, melena, fatigue, enlarged or tender lymph nodes, pallor, history of anemia.    Physical Exam: 59 y.o. female  There were no vitals filed for this visit.    General Appearance:          Alert, cooperative, in no acute distress                                                 Head:    Normocephalic, without obvious abnormality, atraumatic   Eyes:            Lids and lashes normal,  conjunctivae and sclerae normal, no   icterus, no pallor, corneas clear, PERRLA   Ears:    Ears appear intact with no abnormalities noted   Throat:   No oral lesions, no thrush, oral mucosa moist   Neck:   No adenopathy, supple, trachea midline, no thyromegaly, no   carotid bruit, no JVD   Back:     No kyphosis present, no scoliosis present, no skin lesions,      erythema or scars, no tenderness to percussion or                   palpation,   range of motion normal   Lungs:     Clear to auscultation,respirations regular, even and                  unlabored    Heart:    Regular rhythm and normal rate, normal S1 and S2, no            murmur, no gallop, no rub, no click   Chest Wall:    No abnormalities observed   Abdomen:     Normal bowel sounds, no masses, no organomegaly, soft        non-tender, non-distended, no guarding, no rebound                tenderness   Rectal:     Deferred   Extremities:   Tenderness over medial aspect of the right knee. Moves all extremities well, no edema,   no cyanosis, no redness   Pulses:   Pulses palpable and equal bilaterally   Skin:   No bleeding, bruising or rash   Lymph nodes:   No palpable adenopathy   Neurologic:   Cranial nerves 2 - 12 grossly intact, sensation intact, DTR       present and equal bilaterally      Right knee. Patient has crepitus throughout range of motion. Positive patellar grind test. Mild effusion. Lachman is negative. Pivot shift is negative. Anterior and posterior drawer signs are negative. Significant joint line tenderness is noted on the medial aspect of the knee. Patient has a varus orientation of the knee. There is fullness and tenderness in the Popliteal fossa. Mild distention of a Popliteal cyst is noted in this location. Range of motion in flexion is from 0- 110 degrees. Neurovascular status is intact.  Dorsalis pedis and posterior tibial artery pulses are palpable. Common peroneal nerve function is well preserved. Patient's gait is cautious and  antalgic. Skin and soft tissues are mildly swollen, consistent with synovitis and effusion. The patient has a significant limp with the first few steps after starting the gait cycle. Getting out of a chair takes a lot of effort due to pain on knee flexion.     Diagnostic Tests:  No visits with results within 2 Day(s) from this visit.   Latest known visit with results is:   Appointment on 10/07/2019   Component Date Value Ref Range Status   • ABO Type 10/07/2019 A   Final   • RH type 10/07/2019 Negative   Final   • Antibody Screen 10/07/2019 Negative   Final   • T&S Expiration Date 10/07/2019 10/18/2019 11:59:00 PM   Final   • PTT 10/07/2019 25.3  24.0 - 31.0 seconds Final   • Glucose 10/07/2019 102* 65 - 99 mg/dL Final   • BUN 10/07/2019 7* 8 - 20 mg/dL Final   • Creatinine 10/07/2019 0.60  0.40 - 1.00 mg/dL Final   • Sodium 10/07/2019 128* 136 - 144 mmol/L Final   • Potassium 10/07/2019 3.2* 3.6 - 5.1 mmol/L Final   • Chloride 10/07/2019 95* 101 - 111 mmol/L Final   • CO2 10/07/2019 23.0  22.0 - 32.0 mmol/L Final   • Calcium 10/07/2019 8.8* 8.9 - 10.3 mg/dL Final   • eGFR Non African Amer 10/07/2019 103  >60 mL/min/1.73 Final   • BUN/Creatinine Ratio 10/07/2019 11.7  5.4 - 26.2 Final   • Anion Gap 10/07/2019 13.2  5.0 - 15.0 mmol/L Final   • WBC 10/07/2019 11.70* 3.40 - 10.80 10*3/mm3 Final   • RBC 10/07/2019 4.14  3.77 - 5.28 10*6/mm3 Final   • Hemoglobin 10/07/2019 13.2  12.0 - 15.9 g/dL Final   • Hematocrit 10/07/2019 38.6  34.0 - 46.6 % Final   • MCV 10/07/2019 93.4  79.0 - 97.0 fL Final   • MCH 10/07/2019 31.9  26.6 - 33.0 pg Final   • MCHC 10/07/2019 34.2  31.5 - 35.7 g/dL Final   • RDW 10/07/2019 13.3  12.3 - 15.4 % Final   • RDW-SD 10/07/2019 43.3  37.0 - 54.0 fl Final   • MPV 10/07/2019 8.0  6.0 - 12.0 fL Final   • Platelets 10/07/2019 383  140 - 450 10*3/mm3 Final   • Hemoglobin A1C 10/07/2019 5.1  3.5 - 5.6 % Final   • MRSA SCREEN CX 10/07/2019 No Methicillin Resistant Staphylococcus aureus isolated    Final   • Protime 10/07/2019 9.8  9.6 - 11.7 Seconds Final   • INR 10/07/2019 0.92  0.90 - 1.10 Final   • Color, UA 10/07/2019 Yellow  Yellow, Straw Final   • Appearance, UA 10/07/2019 Clear  Clear Final   • pH, UA 10/07/2019 6.5  5.0 - 8.0 Final   • Specific Gravity, UA 10/07/2019 <=1.005  1.005 - 1.030 Final   • Glucose, UA 10/07/2019 Negative  Negative Final   • Ketones, UA 10/07/2019 Negative  Negative Final   • Bilirubin, UA 10/07/2019 Negative  Negative Final   • Blood, UA 10/07/2019 Negative  Negative Final   • Protein, UA 10/07/2019 Negative  Negative Final   • Leuk Esterase, UA 10/07/2019 Negative  Negative Final   • Nitrite, UA 10/07/2019 Negative  Negative Final   • Urobilinogen, UA 10/07/2019 0.2 E.U./dL  0.2 - 1.0 E.U./dL Final     No results found.      Assessment:  Patient Active Problem List   Diagnosis   • Knee pain, bilateral   • Primary osteoarthritis of right knee   • Status post total knee replacement, left   • Tear of deltoid ligament of ankle   • Dislocation of right knee   • Acute pain of right knee   • Seizure-like activity (CMS/HCC)   • History of total knee arthroplasty, left   • Acute medial meniscus tear of right knee   • Chronic pain of left knee         Plan:  The patient voiced understanding of the risks, benefits, and alternative forms of treatment that were discussed and the patient consents to proceed with right total knee arthroplasty.     The patient was seen today for preoperative discussion.  The patient has been tried on over-the-counter and prescription NSAID's despite the risks of anti-inflammatory bleeding, peptic ulcers and erosive gastritis with short term benefit only.  Braces have been prescribed for mechanical support.  Patient has been participating in an exercise program specifically targeting joint pain relief with limited benefit. Intraarticular injections have been used periodically with some but not complete relief of pain.  Ambulation aids have also been  utilized.      The details of the surgical procedure were explained including the location of probable incisions and a description of the likely hardware/grafts to be used. The patient understands the likely convalescence after surgery as well as the rehabilitation required.  Also, we have thoroughly discussed with the patient the risks, benefits and alternatives to surgery.  Risks include but are not limited to the risk of infection, joint stiffness, limited range of motion, wound healing problems, scar tissue build up, myocardial infarction, stroke, blood clots (including DVT and/or pulmonary embolus along with the risk of death) neurologic and/or vascular injury, limb length discrepancy, fracture, dislocation, nonunion, malunion, continued pain and need for further surgery including hardware failure requiring revision.   Discharge Plan: tomorrow to home and home health      Date: 10/16/2019    Meño Almaraz MD      DICTATED UTILIZING DRAGON DICTATION

## 2019-10-16 NOTE — CONSULTS
"Referring Provider: Rufina  Reason for Consultation:medical management post op of anxiety, HTN     LOS: 0 days   Patient Care Team:  Nora, No Known as PCP - General    Chief Complaint:  Post operative pain right leg     Subjective     Interval History:     59 year old female with PMH of OA and post op TKR today on right. Dressing is clean and dry. Patient is sitting up on side of bed, awake, alert and requesting pain medication for knee pain. PMH of multiple surgeries and TKR in past of left knee. Patient reports 15 surgeries on left knee. She is a daily tobacco user and requested a nicotine patch . PMH of anxiety, sleep disorder and HTN.     Patient Complaints: \" Can I have a Percocet or something? \"  Patient Denies:  Dizziness, nausea, chest pain or dyspnea   History taken from: patient    Review of Systems   Constitutional: Negative.    HENT: Negative.    Eyes: Negative.    Respiratory: Negative.    Cardiovascular: Negative.    Gastrointestinal: Negative.    Endocrine: Negative.    Genitourinary: Negative.    Musculoskeletal:        Left knee pain post of TKR today   Skin: Negative.    Allergic/Immunologic: Negative.    Neurological: Negative.    Hematological: Negative.    Psychiatric/Behavioral: Negative.        Objective     Vital Signs  Vitals:    10/16/19 1618 10/16/19 1643 10/16/19 1834 10/16/19 2000   BP: 112/67 124/84 124/80    BP Location:  Right arm Left arm    Patient Position:  Sitting Sitting    Pulse: 69 85 74 67   Resp: 11 16 14 20   Temp: 97.8 °F (36.6 °C) 97.5 °F (36.4 °C) 98 °F (36.7 °C)    TempSrc:  Oral Oral    SpO2: 96% 97% 95% 97%   Weight:       Height:             Physical Exam   Constitutional: She is oriented to person, place, and time. She appears well-developed and well-nourished.   HENT:   Head: Normocephalic and atraumatic.   Eyes: EOM are normal.   Neck: Normal range of motion. Neck supple.   Cardiovascular: Normal rate, regular rhythm and normal heart sounds.   Pulmonary/Chest: " Effort normal and breath sounds normal.   Abdominal: Soft. Bowel sounds are normal.   Musculoskeletal: Normal range of motion.   Neurological: She is alert and oriented to person, place, and time.   Skin: Skin is warm and dry.   Psychiatric: She has a normal mood and affect. Her behavior is normal. Judgment and thought content normal.   Vitals reviewed.       Results Review:       Lab Results (last 24 hours)     ** No results found for the last 24 hours. **           Imaging Results (last 24 hours)     Procedure Component Value Units Date/Time    XR Knee 1 or 2 View Right [[id number]] Collected:  10/16/19 1158     Updated:  10/16/19 1201    Narrative:       Examination: XR KNEE 1 OR 2 VW RIGHT-     Date of Exam: 10/16/2019 11:49 AM     Indication: Post-Op Knee Arthoplasty; M17.11-Unilateral primary  osteoarthritis, right knee.     Comparison: None available.     Technique: Two radiographic views of the right knee were obtained.     Findings:  There is evidence of recent total right knee arthroplasty. The  prosthesis appears intact. There is adjacent soft tissue gas. Alignment  is anatomic. No fracture or dislocation.          Impression:       Findings of total right knee arthroplasty without complicating features.     Electronically Signed By-Cooper Crowell On:10/16/2019 11:59 AM  This report was finalized on [id number] by  Cooper Crowell, .          Medication Review:    Scheduled Meds:    acetaminophen 1,000 mg Oral Q8H   [START ON 10/17/2019] amLODIPine 10 mg Oral Q24H   clindamycin 900 mg Intravenous Q8H   docusate sodium 100 mg Oral BID   estradiol 2 mg Oral Daily   [START ON 10/17/2019] ferrous sulfate 324 mg Oral Daily With Breakfast   gabapentin 800 mg Oral TID   [START ON 10/17/2019] losartan-HCTZ (HYZAAR) 100-25 combo dose  Oral Daily   [START ON 10/17/2019] meloxicam 15 mg Oral Daily   nicotine 1 patch Transdermal Q24H   [START ON 10/17/2019] oxybutynin XL 15 mg Oral Nightly   oxyCODONE 10 mg Oral Q12H    [START ON 10/17/2019] pantoprazole 40 mg Oral QAM   polyethylene glycol 17 g Oral Daily   rivaroxaban 10 mg Oral Daily With Dinner   traZODone 100 mg Oral Nightly     Continuous Infusions:    sodium chloride 100 mL/hr Last Rate: 100 mL/hr (10/16/19 5249)     PRN Meds:.ALPRAZolam  •  baclofen  •  bisacodyl  •  Calcium Gluconate-NaCl **AND** calcium gluconate **AND** Calcium  •  diphenhydrAMINE **OR** diphenhydrAMINE  •  magnesium hydroxide  •  Morphine **AND** naloxone  •  ondansetron **OR** ondansetron  •  oxyCODONE  •  potassium chloride **OR** potassium chloride **OR** potassium chloride  •  promethazine  •  traMADol    Assessment/Plan       Primary osteoarthritis of right knee    Chronic pain of left knee    S/P TKR on right today , followed by ortho- on pain medication and Clindamycin per surgery.     HTN- controlled- continue home Losartan , Norvasc and HCTZ, monitor     GERD- on PPI     HRT- on estradiol    OAB on Oxybutinen    DVT prophylaxis - on Rivaroxaban per surgery       Plan for disposition: Defer for now    DICK Isaacs  10/16/19  10:43 PM

## 2019-10-16 NOTE — ADDENDUM NOTE
Addendum  created 10/16/19 1316 by Marychuy Fontenot CRNA    Alternative orders not taken and original order placed, Order list changed, Order sets accessed

## 2019-10-16 NOTE — PLAN OF CARE
Problem: Patient Care Overview  Goal: Plan of Care Review  Outcome: Ongoing (interventions implemented as appropriate)   10/16/19 2567   OTHER   Outcome Summary 60 y/o F s/p R TKA per Dr. Almaraz POD 0. History of several surgeries L knee. Pt requires constant redirection, is impulsive, anxious and talkative. Pt tolerates ambulation, bed mobility and transfers well. She req CGA for functional mobility. Her R knee ROM is 0-90. She will benefit from HHPT upon d/c.

## 2019-10-17 VITALS
DIASTOLIC BLOOD PRESSURE: 74 MMHG | WEIGHT: 171.3 LBS | HEIGHT: 66 IN | TEMPERATURE: 98 F | OXYGEN SATURATION: 98 % | HEART RATE: 94 BPM | SYSTOLIC BLOOD PRESSURE: 126 MMHG | RESPIRATION RATE: 18 BRPM | BODY MASS INDEX: 27.53 KG/M2

## 2019-10-17 LAB
ANION GAP SERPL CALCULATED.3IONS-SCNC: 12.6 MMOL/L (ref 5–15)
BASOPHILS # BLD AUTO: 0 10*3/MM3 (ref 0–0.2)
BASOPHILS NFR BLD AUTO: 0.2 % (ref 0–1.5)
BUN BLD-MCNC: 15 MG/DL (ref 6–20)
BUN/CREAT SERPL: 17.6 (ref 7–25)
CALCIUM SPEC-SCNC: 8.5 MG/DL (ref 8.6–10.5)
CHLORIDE SERPL-SCNC: 101 MMOL/L (ref 98–107)
CO2 SERPL-SCNC: 24 MMOL/L (ref 22–29)
CREAT BLD-MCNC: 0.85 MG/DL (ref 0.57–1)
DEPRECATED RDW RBC AUTO: 46.4 FL (ref 37–54)
EOSINOPHIL # BLD AUTO: 0 10*3/MM3 (ref 0–0.4)
EOSINOPHIL NFR BLD AUTO: 0.1 % (ref 0.3–6.2)
ERYTHROCYTE [DISTWIDTH] IN BLOOD BY AUTOMATED COUNT: 13.9 % (ref 12.3–15.4)
GFR SERPL CREATININE-BSD FRML MDRD: 69 ML/MIN/1.73
GLUCOSE BLD-MCNC: 104 MG/DL (ref 65–99)
HCT VFR BLD AUTO: 30.2 % (ref 34–46.6)
HGB BLD-MCNC: 10.5 G/DL (ref 12–15.9)
LYMPHOCYTES # BLD AUTO: 1.4 10*3/MM3 (ref 0.7–3.1)
LYMPHOCYTES NFR BLD AUTO: 9.7 % (ref 19.6–45.3)
MCH RBC QN AUTO: 32.9 PG (ref 26.6–33)
MCHC RBC AUTO-ENTMCNC: 34.6 G/DL (ref 31.5–35.7)
MCV RBC AUTO: 94.9 FL (ref 79–97)
MONOCYTES # BLD AUTO: 1.3 10*3/MM3 (ref 0.1–0.9)
MONOCYTES NFR BLD AUTO: 9.4 % (ref 5–12)
NEUTROPHILS # BLD AUTO: 11.4 10*3/MM3 (ref 1.7–7)
NEUTROPHILS NFR BLD AUTO: 80.6 % (ref 42.7–76)
NRBC BLD AUTO-RTO: 0.1 /100 WBC (ref 0–0.2)
PLATELET # BLD AUTO: 292 10*3/MM3 (ref 140–450)
PMV BLD AUTO: 7.7 FL (ref 6–12)
POTASSIUM BLD-SCNC: 4.6 MMOL/L (ref 3.5–5.2)
RBC # BLD AUTO: 3.18 10*6/MM3 (ref 3.77–5.28)
SODIUM BLD-SCNC: 133 MMOL/L (ref 136–145)
WBC NRBC COR # BLD: 14.1 10*3/MM3 (ref 3.4–10.8)

## 2019-10-17 PROCEDURE — A9270 NON-COVERED ITEM OR SERVICE: HCPCS | Performed by: PHYSICIAN ASSISTANT

## 2019-10-17 PROCEDURE — G0378 HOSPITAL OBSERVATION PER HR: HCPCS

## 2019-10-17 PROCEDURE — 63710000001 GABAPENTIN 400 MG CAPSULE: Performed by: PHYSICIAN ASSISTANT

## 2019-10-17 PROCEDURE — 63710000001 MELOXICAM 15 MG TABLET: Performed by: PHYSICIAN ASSISTANT

## 2019-10-17 PROCEDURE — 63710000001 POTASSIUM CHLORIDE 20 MEQ TABLET CONTROLLED-RELEASE: Performed by: NURSE PRACTITIONER

## 2019-10-17 PROCEDURE — 97110 THERAPEUTIC EXERCISES: CPT

## 2019-10-17 PROCEDURE — 85025 COMPLETE CBC W/AUTO DIFF WBC: CPT | Performed by: NURSE PRACTITIONER

## 2019-10-17 PROCEDURE — 97530 THERAPEUTIC ACTIVITIES: CPT

## 2019-10-17 PROCEDURE — 63710000001 OXYCODONE 10 MG TABLET EXTENDED-RELEASE 12 HOUR: Performed by: PHYSICIAN ASSISTANT

## 2019-10-17 PROCEDURE — 63710000001 DOCUSATE SODIUM 100 MG CAPSULE: Performed by: PHYSICIAN ASSISTANT

## 2019-10-17 PROCEDURE — A9270 NON-COVERED ITEM OR SERVICE: HCPCS | Performed by: NURSE PRACTITIONER

## 2019-10-17 PROCEDURE — 99024 POSTOP FOLLOW-UP VISIT: CPT | Performed by: PHYSICIAN ASSISTANT

## 2019-10-17 PROCEDURE — 63710000001 RIVAROXABAN 10 MG TABLET: Performed by: PHYSICIAN ASSISTANT

## 2019-10-17 PROCEDURE — 63710000001 LOSARTAN 50 MG TABLET 90 EACH BOTTLE: Performed by: PHYSICIAN ASSISTANT

## 2019-10-17 PROCEDURE — 63710000001 ALPRAZOLAM 1 MG TABLET: Performed by: PHYSICIAN ASSISTANT

## 2019-10-17 PROCEDURE — 63710000001 POLYETHYLENE GLYCOL PACK: Performed by: PHYSICIAN ASSISTANT

## 2019-10-17 PROCEDURE — 63710000001 ESTRADIOL 1 MG TABLET: Performed by: PHYSICIAN ASSISTANT

## 2019-10-17 PROCEDURE — 63710000001 FERROUS SULFATE 324 (65 FE) MG TABLET DELAYED-RELEASE: Performed by: PHYSICIAN ASSISTANT

## 2019-10-17 PROCEDURE — 99024 POSTOP FOLLOW-UP VISIT: CPT | Performed by: ORTHOPAEDIC SURGERY

## 2019-10-17 PROCEDURE — 63710000001 NICOTINE 21 MG/24HR PATCH 24 HOUR: Performed by: PHYSICIAN ASSISTANT

## 2019-10-17 PROCEDURE — 63710000001 BACLOFEN 10 MG TABLET: Performed by: PHYSICIAN ASSISTANT

## 2019-10-17 PROCEDURE — 80048 BASIC METABOLIC PNL TOTAL CA: CPT | Performed by: PHYSICIAN ASSISTANT

## 2019-10-17 PROCEDURE — 25010000002 MORPHINE PER 10 MG: Performed by: PHYSICIAN ASSISTANT

## 2019-10-17 PROCEDURE — 97116 GAIT TRAINING THERAPY: CPT

## 2019-10-17 PROCEDURE — 63710000001 AMLODIPINE 5 MG TABLET: Performed by: PHYSICIAN ASSISTANT

## 2019-10-17 PROCEDURE — 63710000001 OXYCODONE 5 MG TABLET: Performed by: PHYSICIAN ASSISTANT

## 2019-10-17 PROCEDURE — 63710000001 ACETAMINOPHEN 500 MG TABLET: Performed by: PHYSICIAN ASSISTANT

## 2019-10-17 PROCEDURE — 63710000001 HYDROCHLOROTHIAZIDE 25 MG TABLET 100 EACH BOX: Performed by: PHYSICIAN ASSISTANT

## 2019-10-17 PROCEDURE — 63710000001 PANTOPRAZOLE 40 MG TABLET DELAYED-RELEASE: Performed by: PHYSICIAN ASSISTANT

## 2019-10-17 PROCEDURE — 94799 UNLISTED PULMONARY SVC/PX: CPT

## 2019-10-17 RX ORDER — NICOTINE 21 MG/24HR
1 PATCH, TRANSDERMAL 24 HOURS TRANSDERMAL EVERY 24 HOURS
Qty: 14 PATCH | Refills: 0 | Status: SHIPPED | OUTPATIENT
Start: 2019-10-17 | End: 2019-10-31

## 2019-10-17 RX ORDER — MELOXICAM 7.5 MG/1
7.5 TABLET ORAL DAILY
Qty: 12 TABLET | Refills: 0 | Status: SHIPPED | OUTPATIENT
Start: 2019-10-18 | End: 2019-12-17

## 2019-10-17 RX ORDER — OXYCODONE HYDROCHLORIDE AND ACETAMINOPHEN 5; 325 MG/1; MG/1
TABLET ORAL
Qty: 50 TABLET | Refills: 0 | Status: SHIPPED | OUTPATIENT
Start: 2019-10-17 | End: 2019-10-21 | Stop reason: SDUPTHER

## 2019-10-17 RX ORDER — FERROUS SULFATE TAB EC 324 MG (65 MG FE EQUIVALENT) 324 (65 FE) MG
324 TABLET DELAYED RESPONSE ORAL
Qty: 14 TABLET | Refills: 0 | Status: SHIPPED | OUTPATIENT
Start: 2019-10-18

## 2019-10-17 RX ADMIN — RIVAROXABAN 10 MG: 10 TABLET, FILM COATED ORAL at 17:54

## 2019-10-17 RX ADMIN — OXYCODONE HYDROCHLORIDE 5 MG: 5 TABLET ORAL at 02:37

## 2019-10-17 RX ADMIN — ALPRAZOLAM 2 MG: 1 TABLET ORAL at 02:38

## 2019-10-17 RX ADMIN — AMLODIPINE BESYLATE 10 MG: 5 TABLET ORAL at 08:35

## 2019-10-17 RX ADMIN — ALPRAZOLAM 2 MG: 1 TABLET ORAL at 10:37

## 2019-10-17 RX ADMIN — POTASSIUM CHLORIDE 40 MEQ: 1500 TABLET, EXTENDED RELEASE ORAL at 02:37

## 2019-10-17 RX ADMIN — CLINDAMYCIN PHOSPHATE 900 MG: 900 INJECTION, SOLUTION INTRAVENOUS at 02:37

## 2019-10-17 RX ADMIN — MELOXICAM 15 MG: 15 TABLET ORAL at 08:38

## 2019-10-17 RX ADMIN — MORPHINE SULFATE 4 MG: 4 INJECTION INTRAVENOUS at 05:07

## 2019-10-17 RX ADMIN — OXYCODONE HYDROCHLORIDE 5 MG: 5 TABLET ORAL at 19:23

## 2019-10-17 RX ADMIN — MORPHINE SULFATE 4 MG: 4 INJECTION INTRAVENOUS at 10:38

## 2019-10-17 RX ADMIN — BACLOFEN 10 MG: 10 TABLET ORAL at 17:54

## 2019-10-17 RX ADMIN — OXYCODONE HYDROCHLORIDE 10 MG: 10 TABLET, FILM COATED, EXTENDED RELEASE ORAL at 08:37

## 2019-10-17 RX ADMIN — ACETAMINOPHEN 1000 MG: 500 TABLET, FILM COATED ORAL at 02:37

## 2019-10-17 RX ADMIN — NICOTINE 1 PATCH: 21 PATCH TRANSDERMAL at 17:54

## 2019-10-17 RX ADMIN — POLYETHYLENE GLYCOL 3350 17 G: 17 POWDER, FOR SOLUTION ORAL at 08:34

## 2019-10-17 RX ADMIN — OXYCODONE HYDROCHLORIDE 5 MG: 5 TABLET ORAL at 16:06

## 2019-10-17 RX ADMIN — ACETAMINOPHEN 1000 MG: 500 TABLET, FILM COATED ORAL at 08:38

## 2019-10-17 RX ADMIN — GABAPENTIN 800 MG: 400 CAPSULE ORAL at 16:05

## 2019-10-17 RX ADMIN — PANTOPRAZOLE SODIUM 40 MG: 40 TABLET, DELAYED RELEASE ORAL at 08:35

## 2019-10-17 RX ADMIN — ESTRADIOL 2 MG: 1 TABLET ORAL at 10:37

## 2019-10-17 RX ADMIN — ALPRAZOLAM 2 MG: 1 TABLET ORAL at 17:54

## 2019-10-17 RX ADMIN — ACETAMINOPHEN 1000 MG: 500 TABLET, FILM COATED ORAL at 17:54

## 2019-10-17 RX ADMIN — DOCUSATE SODIUM 100 MG: 100 CAPSULE ORAL at 08:34

## 2019-10-17 RX ADMIN — GABAPENTIN 800 MG: 400 CAPSULE ORAL at 08:38

## 2019-10-17 RX ADMIN — OXYCODONE HYDROCHLORIDE 5 MG: 5 TABLET ORAL at 10:37

## 2019-10-17 RX ADMIN — HYDROCHLOROTHIAZIDE: 25 TABLET ORAL at 08:35

## 2019-10-17 RX ADMIN — FERROUS SULFATE TAB EC 324 MG (65 MG FE EQUIVALENT) 324 MG: 324 (65 FE) TABLET DELAYED RESPONSE at 08:38

## 2019-10-17 NOTE — DISCHARGE SUMMARY
DISCHARGE SUMMARY      Date of Discharge:  10/17/2019    Discharge Diagnosis: Primary osteoarthritis of right knee    Presenting Problem/History of Present Illness  Active Hospital Problems    Diagnosis  POA   • Chronic pain of left knee [M25.562, G89.29]  Yes      Resolved Hospital Problems    Diagnosis Date Resolved POA   • **Primary osteoarthritis of right knee [M17.11] 10/17/2019 Yes          Hospital Course  Seda Urbina is a 59 y.o. female who has a long-standing history of right knee pain.  Preoperative imaging did show changes of DJD in the area of the right knee.  After exhausting conservative measures and reviewing the risks and benefits of surgery, she elected to proceed with a right total knee replacement.  She underwent that procedure on 10/16/2019 and was delivered to the recovery room in stable condition.  Upon meeting their transfer criteria, she was transferred to the surgical infection floor in stable condition.  She did receive perioperative antibiotics and DVT prophylaxis.  She was evaluated by physical therapy team and did receive their services throughout her stay.  Her hospital course was unremarkable.  Upon discharge, she was ambulating with the assistance of a walker, tolerating an oral diet, her pain was controlled with oral medication.  Prior to discharge, all her questions and concerns were addressed.    Procedures Performed    Procedure(s):  TOTAL KNEE ARTHROPLASTY  -------------------       Consults:   Consults     Date and Time Order Name Status Description    10/16/2019 1701 Inpatient Consult to Hospitalist Completed           Pertinent Test Results: labs: Reviewed and radiology: X-Ray: Reviewed    Condition on Discharge: To home with home care in stable condition    Vital Signs  Temp:  [97.5 °F (36.4 °C)-98.5 °F (36.9 °C)] 98.5 °F (36.9 °C)  Heart Rate:  [65-85] 74  Resp:  [11-20] 20  BP: (111-127)/(67-84) 111/70    Physical Exam:   General Appearance: alert, pleasant,  appears stated age, interactive, cooperative and Sitting up in bed  Extremities: Right lower extremity is grossly well aligned and perfused, sensation intact, dressing intact, plantar and dorsiflexion intact  Skin: color normal and no leasions noted  Neurologic: Mental Status orientated to person, place, time and situation    Discharge Medications     Discharge Medications      New Medications      Instructions Start Date   ferrous sulfate 324 (65 Fe) MG tablet delayed-release EC tablet   324 mg, Oral, Daily With Breakfast   Start Date:  10/18/2019     meloxicam 7.5 MG tablet  Commonly known as:  MOBIC   7.5 mg, Oral, Daily   Start Date:  10/18/2019     nicotine 21 MG/24HR patch  Commonly known as:  NICODERM CQ   1 patch, Transdermal, Every 24 Hours      oxyCODONE-acetaminophen 5-325 MG per tablet  Commonly known as:  PERCOCET   1-2 po q4 hours prn pain      rivaroxaban 10 MG tablet  Commonly known as:  XARELTO   10 mg, Oral, Daily With Dinner         Continue These Medications      Instructions Start Date   ALPRAZolam 0.25 MG tablet  Commonly known as:  XANAX   2 mg, Oral, 3 Times Daily PRN      amLODIPine 10 MG tablet  Commonly known as:  NORVASC   1 tablet once daily      baclofen 10 MG tablet  Commonly known as:  LIORESAL   TK 1 T PO BID PRN      estradiol 0.5 MG tablet  Commonly known as:  ESTRACE   Takes 2 mg once daily      gabapentin 800 MG tablet  Commonly known as:  NEURONTIN   800 mg, Oral, 3 Times Daily      losartan-hydrochlorothiazide 100-25 MG per tablet  Commonly known as:  HYZAAR   1 tablet, Oral, Daily      omeprazole 20 MG capsule  Commonly known as:  priLOSEC   1 tablet once daily      oxybutynin XL 15 MG 24 hr tablet  Commonly known as:  DITROPAN XL   15 mg, Daily      traZODone 100 MG tablet  Commonly known as:  DESYREL   TK 2 TS PO QHS prn             Activity at Discharge:   Activity Instructions     Discharge Activity      Total Knee Replacement Discharge Instructions:    I. ACTIVITIES:  1.  Exercises:  Complete exercise program as taught by the hospital physical therapist 2 times per day  Exercise program will be advanced by the physical therapist  During the day be up ambulating every 2 hours (while awake) for short distances  Complete the ankle pump exercises at least 10 times per hour (while awake)  Elevate legs when in bed and for at least 30 minutes during the day.Use cold packs 20-30 minutes approximately 5 times per day. This should be done before and after completing your exercises and at any time you are experiencing pain/ stiffness in your operative extremity.      2. Activities of Daily Living:  No tub baths, hot tubs, or swimming pools for 4 weeks  May shower with waterproof dressing in place as long as it is intact.  Do not scrub or rub the incision. Let water run over dressing and pat dry.     II. Restrictions  Continue precautions as taught at the hospital  Your surgeon will discuss with you when you will be able to drive again, typically it is when you have enough strength in your leg to step hard on the brake and are off the pain medication.  Weight bearing is as tolerated  First week stay inside on even terrain. May go up and down stairs one stair at a time utilizing the hand rail once cleared by physical therapy to do so.  After one week, you may venture outside (if cleared to do so by physical therapist).    III. Precautions:  Everyone that comes near you should wash their hands  No elective dental, genital-urinary, or colon procedures or surgical procedures for 12 weeks after surgery unless absolutely necessary.   If dental work or surgical procedure is deemed absolutely necessary, you will need to contact your surgeon as you will need to take antibiotics 1 hour prior to any dental work (including teeth cleanings).  Please discuss with your surgeon prophylactic antibiotics as the length of time this intervention will be necessary for you varies with each patient's health history  and situation.  Avoid sick people. If you must be around someone who is ill, they should wear a mask.  Avoid visits to the Emergency Room or Urgent Care unless you are having a life threatening event.   Stockings/ace wraps are to be placed on in the morning and removed at night. Monitor the stockings to ensure that any swelling is not causing the stockings to become too tight. In this case, remove stockings immediately.    IV. INCISION CARE:  Wash your hands often.  Notify office if dressing becomes dislodged or drainage noted. Change the dressing as directed by your physician.   No creams or ointments to the incision  Do not touch or pick at the incision  Check incision every day and notify surgeon immediately if any of the following signs or symptoms are noted:  Increase in redness  Increase in swelling around the incision/dressing and of the entire extremity  Increase in pain  Drainage oozing touching the edges of the dressing  Pulling apart of the edges of the incision  Increase in overall body temperature (greater than 100.5 degrees)  Your surgeon will instruct you regarding suture or staple removal    V. Medications:   1. Anticoagulants: You will be discharged on an anticoagulant. This is a prophylactic medication that helps prevent blood clots during your post-operative period. The type and length of dosage varies based on your individual needs, procedure performed, and surgeon's preference.  While taking the anticoagulant, you should avoid taking any additional aspirin, ibuprofen (Advil or Motrin), Aleve (Naprosyn) or other non-steroidal anti-inflammatory medications, unless prescribed by your surgeon.   Notify surgeon immediately if any trinity bleeding is noted in the urine, stool, emesis, or from the nose or the incision. Blood in the stool will often appear as black rather than red. Blood in urine may appear as pink. Blood in emesis may appear as brown/black like coffee grounds.  You will need to apply  pressure for longer periods of time to any cuts or abrasions to stop bleeding  Avoid alcohol while taking anticoagulants    2. Stool Softeners: You will be at greater risk of constipation after surgery due to being less mobile and the pain medications.   Take stool softeners as instructed by your surgeon while on pain medications. Over the counter Colace 100 mg 1-2 capsules twice daily.   If stools become too loose or too frequent, please decreases the dosage or stop the stool softener.  If constipation occurs despite use of stool softeners, you are to continue the stool softeners and add a laxative (Milk of Magnesia 1 ounce daily as needed)  Drink plenty of fluids, and eat fruits and vegetables during your recovery time    3. Pain Medications utilized after surgery are narcotics and the law requires that the following information be given to all patients that are prescribed narcotics:  CLASSIFICATION: Pain medications are called Opioids and are narcotics  LEGALITIES: It is illegal to share narcotics with others and to drive within 24 hours of taking narcotics  POTENTIAL SIDE EFFECTS: Potential side effects of opioids include: nausea, vomiting, itching, dizziness, drowsiness, dry mouth, constipation, and difficulty urinating.  POTENTIAL ADVERSE EFFECTS:   Opioid tolerance can develop with use of pain medications and this simply means that it requires more and more of the medication to control pain; however, this is seen more in patients that use opioids for longer periods of time.  Opioid dependence can develop with use of Opioids and this simply means that to stop the medication can cause withdrawal symptoms; however, this is seen with patients that use Opioids for longer periods of time.  Opioid addiction can develop with use of Opioids and the incidence of this is very unlikely in patients who take the medications as ordered and stop the medications as instructed.  Opioid overdose can be dangerous, but is  unlikely when the medication is taken as ordered and stopped when ordered. It is important not to mix opioids with alcohol or with and type of sedative such as Benadryl as this can lead to over sedation and respiratory difficulty.  DOSAGE:   Pain medications will need to be taken consistently for the first week to decrease pain and promote adequate pain relief and participation in physical therapy.  After the initial surgical pain begins to resolve, you may begin to decrease the pain medication. By the end of 6 weeks, you should be off of pain medications.  Refills will not be given by the office during evening hours, on weekends, or after 6 weeks post-op.  To seek refills on pain medications during the initial 6 week post-operative period, you must call the office 48 hours in advance to request the refill. The office will then notify you when to  the prescription. DO NOT wait until you are out of the medication to request a refill.    V. FOLLOW-UP VISITS:  You will need to follow up in the office with Dr. Meño Almaraz/ Catherine Ugalde PA-C in 1 week. Please call this number [phone] to schedule this appointment.  You will need to follow up with your primary care physician in 4 weeks.  If you have any concerns or suspected complications prior to your follow up visit, please call your surgeons office. Do not wait until your appointment time if you suspect complications. These will need to be addressed in the office promptly.          Follow-up Appointments  Future Appointments   Date Time Provider Department Center   10/22/2019  9:15 AM Catherine Ugalde PA [facility] None     Additional Instructions for the Follow-ups that You Need to Schedule     Ambulatory Referral to Home Health   As directed      Face to Face Visit Date:  10/17/2019    Follow-up provider for Plan of Care?:  I will be treating the patient on an ongoing basis.  Please send me the Plan of Care for signature.    Follow-up provider:   MOISES CHEW [[id number]]    Reason/Clinical Findings:  weakness    Describe mobility limitations that make leaving home difficult:  unable to drive in current condition    Nursing/Therapeutic Services Requested:  Other (to evaluate and treat)    Frequency:  1 Week 1         Ambulatory Referral to Home Health   As directed      Face to Face Visit Date:  10/17/2019    Follow-up provider for Plan of Care?:  I will be treating the patient on an ongoing basis.  Please send me the Plan of Care for signature.    Follow-up provider:  SAMMI ESPINO [[id number]]    Reason/Clinical Findings:  Postop right total knee    Describe mobility limitations that make leaving home difficult:  Postop right total knee    Nursing/Therapeutic Services Requested:  Physical Therapy    PT orders:  Total joint pathway    Frequency:  1 Week 1         Discharge Follow-up with Specialty: Orthopedics; 1 Week   As directed      Specialty:  Orthopedics    Follow Up:  1 Week    Follow Up Details:  Return to the office to see Dr. Moises Chew/ Sammi Espino PA-C               Test Results Pending at Discharge       COTY Weiss  10/17/19  12:18 PM    I certify that this patient is under my care and that I had a face to face encounter that meets the physician face to face encounter requirements.    The encounter occurred on: 10/17/2019 12:18 PM    Based on the findings of the above encounter, I certify that this patient is confined to the home and needs intermittent skilled nursing care, physical therapy and/or speech therapy.  The patient is under my care, and I have initiated the establishment of the plan of care.    This patient will be followed by a physician who will, periodically, review the plan of care. The findings from this face to face encounter have been communicated with the patient's community based physician who will be assuming this patient's home health plan of care.    I also have provided the agency additional information to support  the patient's homebound status and need for skilled care. (Examples of this information could include physician progress notes, discharge summaries, history and physical forms, operative reports, referral order, etc.)    Meño Almaraz MD

## 2019-10-17 NOTE — DISCHARGE PLACEMENT REQUEST
"Seda Herrnig (59 y.o. Female)     Date of Birth Social Security Number Address Home Phone MRN    1960  [facility] [address] [place] [address] [phone] [id number]    [employer] Status          [employer] Single       Admission Date Admission Type Admitting Provider Attending Provider Department, Room/Bed    10/16/19 Elective Meño Almaraz MD Mehta, Sanjiv, MD [facility] SURGICAL INPATIENT, 4131/1    Discharge Date Discharge Disposition Discharge Destination                       Attending Provider:  Meño Almaraz MD    Allergies:  Keflex [Cephalexin]    Isolation:  None   Infection:  None   Code Status:  CPR    Ht:  167.6 cm (66\")   Wt:  77.7 kg (171 lb 4.8 oz)    Admission Cmt:  None   Principal Problem:  Primary osteoarthritis of right knee [M17.11]                 Active Insurance as of 10/16/2019     Primary Coverage     Payor Plan Insurance Group Employer/Plan Group    [employer]HEM MEDICARE REPLACEMENT [employer] MEDICARE ADVANTAGE [id number]     Payor Plan Address Payor Plan Phone Number Payor Plan Fax Number Effective Dates    [address] [phone]  9/1/2019 - None Entered    [facility] [address]       Subscriber Name Subscriber Birth Date Member ID       SEDA HERRING 1960 [id number]                 Emergency Contacts      (Rel.) Home Phone Work Phone Mobile Phone    JASVIR NAKUL (Significant Other) -- [phone]            Insurance Information                [employer]HE[employer] MEDICARE REPLACEMENT/[employer] MEDICARE ADVANTAGE Phone: [phone]    Subscriber: Seda Herring Subscriber#: [id number]    Group#: [id number] Precert#:           Problem List           Codes Noted - Resolved       Hospital    Chronic pain of left knee ICD-10-CM: M25.562, G89.29  ICD-9-CM: 719.46, 338.29 8/7/2019 - Present    * (Principal) Primary osteoarthritis of right knee ICD-10-CM: M17.11  ICD-9-CM: 715.16 8/10/2016 - Present       Non-Hospital    History of " total knee arthroplasty, left ICD-10-CM: Z96.652  ICD-9-CM: V43.65 2/14/2018 - Present    Acute medial meniscus tear of right knee ICD-10-CM: S83.241A  ICD-9-CM: 836.0 2/14/2018 - Present    Seizure-like activity (CMS/HCC) ICD-10-CM: R56.9  ICD-9-CM: 780.39 11/18/2017 - Present    Dislocation of right knee ICD-10-CM: S83.104A  ICD-9-CM: 836.50 11/2/2017 - Present    Acute pain of right knee ICD-10-CM: M25.561  ICD-9-CM: 719.46 11/2/2017 - Present    Status post total knee replacement, left ICD-10-CM: Z96.652  ICD-9-CM: V43.65 2/5/2017 - Present    Tear of deltoid ligament of ankle ICD-10-CM: S93.429A  ICD-9-CM: 845.01 2/5/2017 - Present    Knee pain, bilateral ICD-10-CM: M25.561, M25.562  ICD-9-CM: 719.46 8/10/2016 - Present             History & Physical      Meño Almaraz MD at 10/16/19 0728          History & Physical       Patient: Seda Urbina    Date of Admission: 10/16/2019  6:17 AM    YOB: 1960    Medical Record Number: [id number]    Attending Physician: Meño Almaraz MD        Chief Complaints: Primary osteoarthritis of right knee [M17.11]  Chronic pain of left knee [M25.562, G89.29]      History of Present Illness: 59 y.o. female presents with Primary osteoarthritis of right knee [M17.11]  Chronic pain of left knee [M25.562, G89.29]. Onset of symptoms was slowly progressive and gradually worse over the past 3 to 4 years.  Symptoms are associated with pain and discomfort on the medial aspect of the right knee.  Symptoms are aggravated by deep flexion and walking up and down the steps..   Symptoms improve with anti-inflammatory medication and use of a brace.. Patient is now being admitted to the services of Meño Almaraz MD for further evaluation and treatment.  She has had a prior left knee replacement surgery along with multiple episodes of revision and continues to have a lot of pain with her left knee as well as her lumbar spine.       Allergies   Allergen Reactions   • Keflex  [Cephalexin] Swelling         Home Medications:  Medications Prior to Admission   Medication Sig Dispense Refill Last Dose   • ALPRAZolam (XANAX) 0.25 MG tablet Take 2 mg by mouth 3 (Three) Times a Day As Needed for Anxiety.   10/16/2019 at Unknown time   • amLODIPine (NORVASC) 10 MG tablet 1 tablet once daily  6 10/16/2019 at Unknown time   • baclofen (LIORESAL) 10 MG tablet TK 1 T PO BID PRN  1 Past Week at Unknown time   • estradiol (ESTRACE) 0.5 MG tablet Takes 2 mg once daily  0 10/15/2019 at Unknown time   • gabapentin (NEURONTIN) 800 MG tablet Take 800 mg by mouth 3 (Three) Times a Day.   10/16/2019 at Unknown time   • losartan-hydrochlorothiazide (HYZAAR) 100-25 MG per tablet Take 1 tablet by mouth Daily.   10/15/2019 at Unknown time   • omeprazole (PriLOSEC) 20 MG capsule 1 tablet once daily  0 10/16/2019 at Unknown time   • oxybutynin XL (DITROPAN XL) 15 MG 24 hr tablet 15 mg Daily.  1 10/16/2019 at Unknown time   • traZODone (DESYREL) 100 MG tablet TK 2 TS PO QHS prn  2 Past Week at Unknown time       Current Medications:  Scheduled Meds:  acetaminophen 1,000 mg Oral Once   clindamycin 900 mg Intravenous Once   gabapentin 600 mg Oral 90 Min Pre-Op   lactated ringers 500 mL Intravenous Once   meloxicam 15 mg Oral Once   oxyCODONE 10 mg Oral 90 Min Pre-Op   sodium chloride 3 mL Intravenous Q12H   tranexamic acid 1,000 mg Intravenous Once     Continuous Infusions:  sodium chloride 9 mL/hr     PRN Meds:.sodium chloride  •  sodium chloride       Past Medical History:   Diagnosis Date   • Anxiety    • Depression    • GERD (gastroesophageal reflux disease)    • Hypertension    • Insomnia    • Knee swelling    • Muscle spasms of both lower extremities    • Panic attacks    • Urinary incontinence         Past Surgical History:   Procedure Laterality Date   • ANKLE SURGERY Left 2003   • HAND SURGERY Right    • JOINT REPLACEMENT      7 knee revision surgery left knee    • KNEE SURGERY Left 2005        Social History      Occupational History   • Not on file   Tobacco Use   • Smoking status: Current Every Day Smoker     Packs/day: 1.00     Types: Cigarettes   • Smokeless tobacco: Never Used   • Tobacco comment: Using patches attempting to quit   Substance and Sexual Activity   • Alcohol use: Yes     Alcohol/week: 2.4 oz     Types: 4 Cans of beer per week     Comment: weekly   • Drug use: No   • Sexual activity: Defer    Social History     Social History Narrative   • Not on file        Family History   Problem Relation Age of Onset   • Deep vein thrombosis Other    • Hypertension Other          Review of Systems:   HEENT: Patient denies any headaches, vision changes, change in hearing, or tinnitus, Patient denies any rhinorrhea,epistaxis, sinus pain, mouth or dental problems, sore throat or hoarseness, or dysphagia  Pulmonary: Patient denies any cough, congestion, SOA, or wheezing  Cardiovascular: Patient denies any chest pain, dyspnea, palpitations, weakness, intolerance of exercise, varicosities, swelling of extremities, known murmur  Gastrointestinal:  Patient denies nausea, vomiting, diarrhea, constipation, loss  of appetite, change in appetite, dysphagia, gas, heartburn, melena, change in bowel habits, use of laxatives or other drugs to alter the function of the gastrointestinal tract.  Genital/Urinary: Patient denies dysuria, change in color of urine, change in frequency of urination, pain with urgency, incontinence, retention, or nocturia.  Musculoskeletal: Patient denies increased warmth; redness; or swelling of joints; limitation of function; deformity; crepitation: pain in a joint or an extremity, the neck, or the back, especially with movement.  Neurological: Patient denies dizziness, tremor, ataxia, difficulty in speaking, change in speech, paresthesia, loss of sensation, seizures, syncope, changes in memory.  Endocrine system: Patient denies tremors, palpitations, intolerance of heat or cold, polyuria, polydipsia,  polyphagia, diaphoresis, exophthalmos, or goiter.  Psychological: Patient denies thoughts/plans or harming self or other; depression,  insomnia, night terrors, lennie, memory loss, disorientation.  Skin: Patient denies any bruising, rashes, discoloration, pruritus, wounds, ulcers, decubiti, changes in the hair or nails  Hematopoietic: Patient denies history of spontaneous or excessive bleeding, epistaxis, hematuria, melena, fatigue, enlarged or tender lymph nodes, pallor, history of anemia.    Physical Exam: 59 y.o. female  There were no vitals filed for this visit.    General Appearance:          Alert, cooperative, in no acute distress                                                 Head:    Normocephalic, without obvious abnormality, atraumatic   Eyes:            Lids and lashes normal, conjunctivae and sclerae normal, no   icterus, no pallor, corneas clear, PERRLA   Ears:    Ears appear intact with no abnormalities noted   Throat:   No oral lesions, no thrush, oral mucosa moist   Neck:   No adenopathy, supple, trachea midline, no thyromegaly, no   carotid bruit, no JVD   Back:     No kyphosis present, no scoliosis present, no skin lesions,      erythema or scars, no tenderness to percussion or                   palpation,   range of motion normal   Lungs:     Clear to auscultation,respirations regular, even and                  unlabored    Heart:    Regular rhythm and normal rate, normal S1 and S2, no            murmur, no gallop, no rub, no click   Chest Wall:    No abnormalities observed   Abdomen:     Normal bowel sounds, no masses, no organomegaly, soft        non-tender, non-distended, no guarding, no rebound                tenderness   Rectal:     Deferred   Extremities:   Tenderness over medial aspect of the right knee. Moves all extremities well, no edema,   no cyanosis, no redness   Pulses:   Pulses palpable and equal bilaterally   Skin:   No bleeding, bruising or rash   Lymph nodes:   No palpable  adenopathy   Neurologic:   Cranial nerves 2 - 12 grossly intact, sensation intact, DTR       present and equal bilaterally      Right knee. Patient has crepitus throughout range of motion. Positive patellar grind test. Mild effusion. Lachman is negative. Pivot shift is negative. Anterior and posterior drawer signs are negative. Significant joint line tenderness is noted on the medial aspect of the knee. Patient has a varus orientation of the knee. There is fullness and tenderness in the Popliteal fossa. Mild distention of a Popliteal cyst is noted in this location. Range of motion in flexion is from 0- 110 degrees. Neurovascular status is intact.  Dorsalis pedis and posterior tibial artery pulses are palpable. Common peroneal nerve function is well preserved. Patient's gait is cautious and antalgic. Skin and soft tissues are mildly swollen, consistent with synovitis and effusion. The patient has a significant limp with the first few steps after starting the gait cycle. Getting out of a chair takes a lot of effort due to pain on knee flexion.     Diagnostic Tests:  No visits with results within 2 Day(s) from this visit.   Latest known visit with results is:   Appointment on 10/07/2019   Component Date Value Ref Range Status   • ABO Type 10/07/2019 A   Final   • RH type 10/07/2019 Negative   Final   • Antibody Screen 10/07/2019 Negative   Final   • T&S Expiration Date 10/07/2019 10/18/2019 11:59:00 PM   Final   • PTT 10/07/2019 25.3  24.0 - 31.0 seconds Final   • Glucose 10/07/2019 102* 65 - 99 mg/dL Final   • BUN 10/07/2019 7* 8 - 20 mg/dL Final   • Creatinine 10/07/2019 0.60  0.40 - 1.00 mg/dL Final   • Sodium 10/07/2019 128* 136 - 144 mmol/L Final   • Potassium 10/07/2019 3.2* 3.6 - 5.1 mmol/L Final   • Chloride 10/07/2019 95* 101 - 111 mmol/L Final   • CO2 10/07/2019 23.0  22.0 - 32.0 mmol/L Final   • Calcium 10/07/2019 8.8* 8.9 - 10.3 mg/dL Final   • eGFR Non African Amer 10/07/2019 103  >60 mL/min/1.73 Final   •  BUN/Creatinine Ratio 10/07/2019 11.7  5.4 - 26.2 Final   • Anion Gap 10/07/2019 13.2  5.0 - 15.0 mmol/L Final   • WBC 10/07/2019 11.70* 3.40 - 10.80 10*3/mm3 Final   • RBC 10/07/2019 4.14  3.77 - 5.28 10*6/mm3 Final   • Hemoglobin 10/07/2019 13.2  12.0 - 15.9 g/dL Final   • Hematocrit 10/07/2019 38.6  34.0 - 46.6 % Final   • MCV 10/07/2019 93.4  79.0 - 97.0 fL Final   • MCH 10/07/2019 31.9  26.6 - 33.0 pg Final   • MCHC 10/07/2019 34.2  31.5 - 35.7 g/dL Final   • RDW 10/07/2019 13.3  12.3 - 15.4 % Final   • RDW-SD 10/07/2019 43.3  37.0 - 54.0 fl Final   • MPV 10/07/2019 8.0  6.0 - 12.0 fL Final   • Platelets 10/07/2019 383  140 - 450 10*3/mm3 Final   • Hemoglobin A1C 10/07/2019 5.1  3.5 - 5.6 % Final   • MRSA SCREEN CX 10/07/2019 No Methicillin Resistant Staphylococcus aureus isolated   Final   • Protime 10/07/2019 9.8  9.6 - 11.7 Seconds Final   • INR 10/07/2019 0.92  0.90 - 1.10 Final   • Color, UA 10/07/2019 Yellow  Yellow, Straw Final   • Appearance, UA 10/07/2019 Clear  Clear Final   • pH, UA 10/07/2019 6.5  5.0 - 8.0 Final   • Specific Gravity, UA 10/07/2019 <=1.005  1.005 - 1.030 Final   • Glucose, UA 10/07/2019 Negative  Negative Final   • Ketones, UA 10/07/2019 Negative  Negative Final   • Bilirubin, UA 10/07/2019 Negative  Negative Final   • Blood, UA 10/07/2019 Negative  Negative Final   • Protein, UA 10/07/2019 Negative  Negative Final   • Leuk Esterase, UA 10/07/2019 Negative  Negative Final   • Nitrite, UA 10/07/2019 Negative  Negative Final   • Urobilinogen, UA 10/07/2019 0.2 E.U./dL  0.2 - 1.0 E.U./dL Final     No results found.      Assessment:  Patient Active Problem List   Diagnosis   • Knee pain, bilateral   • Primary osteoarthritis of right knee   • Status post total knee replacement, left   • Tear of deltoid ligament of ankle   • Dislocation of right knee   • Acute pain of right knee   • Seizure-like activity (CMS/HCC)   • History of total knee arthroplasty, left   • Acute medial meniscus tear  of right knee   • Chronic pain of left knee         Plan:  The patient voiced understanding of the risks, benefits, and alternative forms of treatment that were discussed and the patient consents to proceed with right total knee arthroplasty.     The patient was seen today for preoperative discussion.  The patient has been tried on over-the-counter and prescription NSAID's despite the risks of anti-inflammatory bleeding, peptic ulcers and erosive gastritis with short term benefit only.  Braces have been prescribed for mechanical support.  Patient has been participating in an exercise program specifically targeting joint pain relief with limited benefit. Intraarticular injections have been used periodically with some but not complete relief of pain.  Ambulation aids have also been utilized.      The details of the surgical procedure were explained including the location of probable incisions and a description of the likely hardware/grafts to be used. The patient understands the likely convalescence after surgery as well as the rehabilitation required.  Also, we have thoroughly discussed with the patient the risks, benefits and alternatives to surgery.  Risks include but are not limited to the risk of infection, joint stiffness, limited range of motion, wound healing problems, scar tissue build up, myocardial infarction, stroke, blood clots (including DVT and/or pulmonary embolus along with the risk of death) neurologic and/or vascular injury, limb length discrepancy, fracture, dislocation, nonunion, malunion, continued pain and need for further surgery including hardware failure requiring revision.   Discharge Plan: tomorrow to home and home health      Date: 10/16/2019    Meño Almaraz MD      DICTATED UTILIZING DRAGON DICTATION    Electronically signed by Meño Almaraz MD at 10/16/19 0763       Activity Orders (active) (From admission, onward)    Start     Ordered    10/17/19 0800  Up in Chair 3x / Day - Beginning  POD 1  3 Times Daily      10/16/19 1701    10/17/19 0800  Ambulate in [place] 4x / Day Beginning POD 1  4 Times Daily      10/16/19 1701    10/16/19 170  Weight Bearing - As Tolerated  Continuous      10/16/19 1701    10/16/19 1702  Ambulate in [place] x1 Day of Surgery  Once      10/16/19 1701    10/16/19 1702  May stand to void or use BSC day of surgery. POD #1 and thereafter use bedside commode or bathroom.  Once      10/16/19 1701    Unscheduled  Up in Chair x 1 day of surgery, then up in chair x 3 beginning POD #1.  As Needed      10/16/19 1701    Unscheduled  Patient may shower with assistance.  As Needed      10/16/19 1701          Operative/Procedure Notes (last 24 hours) (Notes from 10/16/19 1028 through 10/17/19 1028)     No notes of this type exist for this encounter.           Physical Therapy Notes (last 24 hours) (Notes from 10/16/19 1029 through 10/17/19 1029)      Lora Benton PT at 10/16/19 1725  Version 1 of 1         Problem: Patient Care Overview  Goal: Plan of Care Review  Outcome: Ongoing (interventions implemented as appropriate)   10/16/19 1723   OTHER   Outcome Summary 58 y/o F s/p R TKA per Dr. Almaraz POD 0. History of several surgeries L knee. Pt requires constant redirection, is impulsive, anxious and talkative. Pt tolerates ambulation, bed mobility and transfers well. She req CGA for functional mobility. Her R knee ROM is 0-90. She will benefit from HHPT upon d/c.            Electronically signed by Lora Benton PT at 10/16/19 1725     Lora Benton PT at 10/16/19 1725  Version 1 of 1         Acute Care - Physical Therapy Initial Evaluation   Federico     Patient Name: Seda Urbina  : 1960  MRN: [id number]  Today's Date: 10/16/2019                Admit Date: 10/16/2019    Visit Dx:     ICD-10-CM ICD-9-CM   1. Primary osteoarthritis of right knee M17.11 715.16     Patient Active Problem List   Diagnosis   • Knee pain, bilateral   • Primary osteoarthritis of  right knee   • Status post total knee replacement, left   • Tear of deltoid ligament of ankle   • Dislocation of right knee   • Acute pain of right knee   • Seizure-like activity (CMS/HCC)   • History of total knee arthroplasty, left   • Acute medial meniscus tear of right knee   • Chronic pain of left knee     Past Medical History:   Diagnosis Date   • Anxiety    • Depression    • GERD (gastroesophageal reflux disease)    • Hypertension    • Insomnia    • Knee swelling    • Muscle spasms of both lower extremities    • Panic attacks    • Urinary incontinence      Past Surgical History:   Procedure Laterality Date   • ANKLE SURGERY Left 2003   • HAND SURGERY Right    • JOINT REPLACEMENT      7 knee revision surgery left knee    • KNEE SURGERY Left 2005        PT ASSESSMENT (last 12 hours)      Physical Therapy Evaluation     Row Name 10/16/19 1641          PT Evaluation Time/Intention    Document Type  evaluation  -SS     Mode of Treatment  physical therapy  -SS     Comment  utilized RW intermittently at home. Pt is very tangential in her speech, anxious and talkative. Requires constant redirection  -     Row Name 10/16/19 1641          General Information    Patient/Family Observations  seated in bed with polar alina in place, IV, supp O2;  present  -SS     Prior Level of Function  independent:  -SS     Equipment Currently Used at Home  walker, rolling  -     Pertinent History of Current Functional Problem  60 y/o F s/p R TKA per Dr. Almaraz POD 0. History of several surgeries L knee.   -     Existing Precautions/Restrictions  fall  -     Row Name 10/16/19 1641          Relationship/Environment    Lives With  significant other  -     Row Name 10/16/19 1641          Resource/Environmental Concerns    Current Living Arrangements  home/apartment/condo  -     Row Name 10/16/19 1641          Living Environment    Living Arrangements  mobile home  -     Home Accessibility  stairs to enter home  -      Living Environment Comment  has hand rail  -     Row Name 10/16/19 1641          Home Main Entrance    Number of Stairs, Main Entrance  three  -     Row Name 10/16/19 1641          Cognitive Assessment/Intervention- PT/OT    Orientation Status (Cognition)  oriented x 4  -     Row Name 10/16/19 1641          Bed Mobility Assessment/Treatment    Bed Mobility Assessment/Treatment  bed mobility (all) activities  -     [place] Level (Bed Mobility)  independent  -     Row Name 10/16/19 1641          Transfer Assessment/Treatment    Transfer Assessment/Treatment  sit-stand transfer  -     Sit-Stand [place] (Transfers)  contact guard  -     Row Name 10/16/19 1641          Sit-Stand Transfer    Assistive Device (Sit-Stand Transfers)  walker, front-wheeled  -     Row Name 10/16/19 1641          Gait/Stairs Assessment/Training    [place] Level (Gait)  contact guard  -     Assistive Device (Gait)  walker, front-wheeled  -SS     Distance in Feet (Gait)  40  -SS     Comment (Gait/Stairs)  decreased knee flexion, decreased gait speed, frequently distracted and requires redirection  -     Row Name 10/16/19 1641          General ROM    GENERAL ROM COMMENTS  R knee 0-90   -[facility] Name 10/16/19 1641          MMT (Manual Muscle Testing)    General MMT Comments  >3/5 B LEs  -     Row Name 10/16/19 1641          Sensory Assessment/Intervention    Sensory General Assessment  no sensation deficits identified  -[facility] Name 10/16/19 1641          Pain Assessment    Additional Documentation  Pain Scale: FACES Pre/Post-Treatment (Group)  -SS     Row Name 10/16/19 1641          Pain Scale: FACES Pre/Post-Treatment    Pain: FACES Scale, Pretreatment  0-->no hurt  -     Pain: FACES Scale, Post-Treatment  2-->hurts little bit  -     Row Name             Wound 10/16/19 0953 Right knee Incision    Wound - Properties Group Date first assessed: 10/16/19  -BS Time first assessed: 0953  -BS Side: Right   -BS Location: knee  -BS Primary Wound Type: Incision  -BS    Row Name 10/16/19 1641          Plan of Care Review    Plan of Care Reviewed With  patient  -SS     Row Name 10/16/19 1641          Physical Therapy Clinical Impression    Criteria for Skilled Interventions Met (PT Clinical Impression)  yes;treatment indicated  -SS     Pathology/Pathophysiology Noted (Describe Specifically for Each System)  musculoskeletal  -SS     Impairments Found (describe specific impairments)  gait, locomotion, and balance;joint integrity and mobility;motor function  -SS     Rehab Potential (PT Clinical Summary)  good, to achieve stated therapy goals  -SS     Predicted Duration of Therapy (PT)  until d/c from F  -SS     Row Name 10/16/19 1641          Vital Signs    Intra Systolic BP Rehab  137  -SS     Intra Treatment Diastolic BP  75  -SS     Row Name 10/16/19 1641          Physical Therapy Goals    Bed Mobility Goal Selection (PT)  bed mobility, PT goal 1  -SS     Transfer Goal Selection (PT)  transfer, PT goal 1  -SS     Gait Training Goal Selection (PT)  gait training, PT goal 1  -SS     Row Name 10/16/19 1641          Bed Mobility Goal 1 (PT)    Activity/Assistive Device (Bed Mobility Goal 1, PT)  bed mobility activities, all  -SS     [place] Level/Cues Needed (Bed Mobility Goal 1, PT)  conditional independence  -SS     Time Frame (Bed Mobility Goal 1, PT)  long term goal (LTG);2 weeks  -     Row Name 10/16/19 1641          Transfer Goal 1 (PT)    Activity/Assistive Device (Transfer Goal 1, PT)  transfers, all  -SS     [place] Level/Cues Needed (Transfer Goal 1, PT)  conditional independence  -SS     Time Frame (Transfer Goal 1, PT)  long term goal (LTG);2 weeks  -     Row Name 10/16/19 1641          Gait Training Goal 1 (PT)    Activity/Assistive Device (Gait Training Goal 1, PT)  gait (walking locomotion);walker, rolling  -SS     [place] Level (Gait Training Goal 1, PT)  conditional independence  -SS      Distance (Gait Goal 1, PT)  150  -     Time Frame (Gait Training Goal 1, PT)  long term goal (LTG);2 weeks  -     Row Name 10/16/19 6008          Patient Education Goal (PT)    Activity (Patient Education Goal, PT)  HEP  -     [place]son/Cues/Accuracy (Memory Goal 2, PT)  demonstrates adequately;independent;verbalizes understanding  -     Time Frame (Patient Education Goal, PT)  long term goal (LTG);2 weeks  -       User Key  (r) = Recorded By, (t) = Taken By, (c) = Cosigned By    Initials Name Provider Type    SS Lora Benton, PT Physical Therapist    Hermelindo Gutierrez, RN Registered Nurse        Physical Therapy Education     Title: PT OT SLP Therapies (Done)     Topic: Physical Therapy (Done)     Point: Mobility training (Done)     Learning Progress Summary           Patient Acceptance, E, VU by  at 10/16/2019  5:25 PM                               User Key     Initials Effective Dates Name Provider Type Discipline     06/19/19 -  Lora Benton, PT Physical Therapist PT              PT Recommendation and Plan  Anticipated Discharge Disposition (PT): home with home health  Planned Therapy Interventions (PT Eval): balance training, bed mobility training, gait training, home exercise program, patient/family education, stair training, strengthening, stretching, transfer training, ROM (range of motion)  Therapy Frequency (PT Clinical Impression): 2 times/day  Outcome Summary/Treatment Plan (PT)  Anticipated Discharge Disposition (PT): home with home health  Plan of Care Reviewed With: patient  Outcome Summary: 60 y/o F s/p R TKA per Dr. Almaraz POD 0. History of several surgeries L knee. Pt requires constant redirection, is impulsive, anxious and talkative. Pt tolerates ambulation, bed mobility and transfers well. She req CGA for functional mobility. Her R knee ROM is 0-90. She will benefit from HHPT upon d/c.      Time Calculation:   PT Charges     Row Name 10/16/19 0593             Time  Calculation    Start Time  1641  -      Stop Time  1710  -      Time Calculation (min)  29 min  -      PT Received On  10/16/19  -      PT - Next Appointment  10/17/19  -      PT Goal Re-Cert Due Date  10/30/19  -         Time Calculation- PT    Total Timed Code Minutes- PT  10 minute(s)  -        User Key  (r) = Recorded By, (t) = Taken By, (c) = Cosigned By    Initials Name Provider Type     Lora Benton, PT Physical Therapist        Therapy Charges for Today     Code Description Service Date Service Provider Modifiers Qty    99748168691  PT EVAL MOD COMPLEXITY 4 10/16/2019 Lora Benton, PT GP 1    [id number]  GAIT TRAINING EA 15 MIN 10/16/2019 Lora Benton, PT GP 1                 Lora Benton PT  10/16/2019          Electronically signed by Lora Benton, PT at 10/16/19 7264

## 2019-10-17 NOTE — PLAN OF CARE
Problem: Patient Care Overview  Goal: Plan of Care Review  Outcome: Ongoing (interventions implemented as appropriate)   10/17/19 1611   OTHER   Outcome Summary Patient able to tolerate 200' ambulation and stair negotiation. Also performs TKA exercises. knee ROM is decreased compared to POD 0. Pt has met PT goals and is safe to d/c home with HHPT.

## 2019-10-17 NOTE — PROGRESS NOTES
Discharge Planning Assessment   [facility]     Patient Name: Seda Urbina  MRN: [id number]  Today's Date: 10/17/2019    Admit Date: 10/16/2019    Discharge Needs Assessment     Row Name 10/17/19 1219       Living Environment    Lives With  significant other    Current Living Arrangements  home/apartment/condo    Primary Care Provided by  self    Provides Primary Care For  no one    Family Caregiver if Needed  significant other    Able to Return to Prior Arrangements  yes       Resource/Environmental Concerns    Resource/Environmental Concerns  none       Transition Planning    Patient/Family Anticipates Transition to  home with help/services    Patient/Family Anticipated Services at Transition      Transportation Anticipated  family or friend will provide       Discharge Needs Assessment    Readmission Within the Last 30 Days  no previous admission in last 30 days    Concerns to be Addressed  discharge planning    Equipment Currently Used at Home  walker, rolling    Anticipated Changes Related to Illness  none    Equipment Needed After Discharge  none    Discharge Facility/Level of Care Needs  home with home health    Offered/Gave Vendor List  yes        Discharge Plan     Row Name 10/17/19 1220       Plan    Plan  home with [facility], order in [facility] and clinicals faxed    Patient/Family in Agreement with Plan  yes    Plan Comments  spoke to patient who states she wants [facility] health and has rolling walker at home, faxed clinicals to [facility] at [phone]        Destination      No service coordination in this encounter.      Durable Medical Equipment      No service coordination in this encounter.      Dialysis/Infusion      No service coordination in this encounter.      Home Medical Care      Service Provider Request Status Selected Services Address Phone Number Fax Number    [facility] Pending - No Request Sent N/A [address] [place] [address] [phone]  --      Therapy      No service coordination in this encounter.      Community Resources      No service coordination in this encounter.        Expected Discharge Date and Time     Expected Discharge Date Expected Discharge Time    Oct 17, 2019             Latha Chance RN   [phone]

## 2019-10-17 NOTE — THERAPY TREATMENT NOTE
Acute Care - Physical Therapy Treatment Note   Federico     Patient Name: Seda Urbina  : 1960  MRN: [id number]  Today's Date: 10/17/2019             Admit Date: 10/16/2019    Visit Dx:    ICD-10-CM ICD-9-CM   1. Primary osteoarthritis of right knee M17.11 715.16     Patient Active Problem List   Diagnosis   • Knee pain, bilateral   • Status post total knee replacement, left   • Tear of deltoid ligament of ankle   • Dislocation of right knee   • Acute pain of right knee   • Seizure-like activity (CMS/HCC)   • History of total knee arthroplasty, left   • Acute medial meniscus tear of right knee   • Chronic pain of left knee       Therapy Treatment    Rehabilitation Treatment Summary     Row Name 10/17/19 1107             Treatment Time/Intention    Document Type  therapy note (daily note)  -SS      Mode of Treatment  physical therapy  -SS      Therapy Frequency (PT Clinical Impression)  2 times/day  -SS      Treatment Considerations/Comments  pt requires frequent redirection and clare very distractable  -SS2      Recorded by [SS] Lora Benton, PT 10/17/19 1121  [SS2] Lora Benton, PT 10/17/19 1220      Row Name 10/17/19 110             Cognitive Assessment/Intervention- PT/OT    Orientation Status (Cognition)  oriented x 4  -SS      Recorded by [SS] Lora Benton, PT 10/17/19 1121      Row Name 10/17/19 1107             Bed Mobility Assessment/Treatment    Bed Mobility Assessment/Treatment  bed mobility (all) activities  -SS      [place] Level (Bed Mobility)  independent  -SS      Comment (Bed Mobility)  pt performs supine to sit and sit to supine- no issue with hip flexion  -SS      Recorded by [SS] Lora Benton, PT 10/17/19 1220      Row Name 10/17/19 1107             Transfer Assessment/Treatment    Transfer Assessment/Treatment  sit-stand transfer  -SS      Comment (Transfers)  performs trsnfers to and from bed, to and from toilet. Cues for proximity to toilet prior to  sitting  -SS      Recorded by [SS] Lora Benton, PT 10/17/19 1220      Row Name 10/17/19 1107             Sit-Stand Transfer    Sit-Stand [place] (Transfers)  supervision;contact guard  -SS      Recorded by [SS] Lora Benton, PT 10/17/19 1220      Row Name 10/17/19 1107             Gait/Stairs Assessment/Training    [place] Level (Gait)  supervision;contact guard  -SS      Assistive Device (Gait)  walker, front-wheeled  -SS      Distance in Feet (Gait)  100  -SS      Comment (Gait/Stairs)  cues for increasing knee flexion, pt with early heel off on R LE- cues to improve  -SS      Recorded by [SS] Lora Benton, PT 10/17/19 1220      Row Name 10/17/19 1107             Motor Skills Assessment/Interventions    Additional Documentation  Therapeutic Exercise (Group);Therapeutic Exercise Interventions (Group)  -SS      Recorded by [SS] Lora Benton, PT 10/17/19 1121      Row Name 10/17/19 1107             Therapeutic Exercise    Therapeutic Exercise  supine, lower extremities;seated, lower extremities  -SS      Additional Documentation  Therapeutic Exercise (Row)  -SS      Recorded by [SS] Lora Benton, PT 10/17/19 1121      Row Name 10/17/19 1107             Lower Extremity Seated Therapeutic Exercise    Performed, Seated Lower Extremity (Therapeutic Exercise)  LAQ (long arc quad), knee extension  -SS      Sets/Reps Detail, Seated Lower Extremity (Therapeutic Exercise)  10 reps  -SS      Recorded by [SS] Lora Benton, PT 10/17/19 1121      Row Name 10/17/19 1107             Lower Extremity Supine Therapeutic Exercise    Performed, Supine Lower Extremity (Therapeutic Exercise)  ankle pumps;heel slides;SLR (straight leg raise)  -SS      Sets/Reps Detail, Supine Lower Extremity (Therapeutic Exercise)  10 reps  -SS      Recorded by [SS] Lora Benton, PT 10/17/19 1121      Row Name 10/17/19 1107             Positioning and Restraints    Pre-Treatment Position  in bed   -SS      Post Treatment Position  bed  -SS      Recorded by [SS] Lora Benton, PT 10/17/19 1220      Row Name 10/17/19 1107             Pain Scale: FACES Pre/Post-Treatment    Pain: FACES Scale, Pretreatment  0-->no hurt  -SS      Pain: FACES Scale, Post-Treatment  4-->hurts little more  -SS      Recorded by [SS] Lora Benton, PT 10/17/19 1220      Row Name                Wound 10/16/19 0953 Right knee Incision    Wound - Properties Group Date first assessed: 10/16/19 [BS] Time first assessed: 0953 [BS] Side: Right [BS] Location: knee [BS] Primary Wound Type: Incision [BS] Recorded by:  [BS] Hermelindo Barroso, RN 10/16/19 0953    Row Name 10/17/19 1200             Outcome Summary/Treatment Plan (PT)    Daily Summary of Progress (PT)  progress toward functional goals is good  -SS      Anticipated Discharge Disposition (PT)  home with home health  -SS      Recorded by [SS] Lora Benton, PT 10/17/19 1220        User Key  (r) = Recorded By, (t) = Taken By, (c) = Cosigned By    Initials Name Effective Dates Discipline     Lora Benton, PT 06/19/19 -  PT    BS Hermelindo Barroso, RN 03/01/19 -  Nurse          Wound 10/16/19 0953 Right knee Incision (Active)   Dressing Appearance dry;intact;no drainage 10/17/2019  7:15 AM   Closure SHABBIR 10/17/2019  7:15 AM   Drainage Amount none 10/16/2019  7:15 PM           Physical Therapy Education     Title: PT OT SLP Therapies (Done)     Topic: Physical Therapy (Done)     Point: Mobility training (Done)     Learning Progress Summary           Patient Acceptance, E, VU by  at 10/17/2019 12:22 PM    Acceptance, E, VU by  at 10/16/2019  5:25 PM                               User Key     Initials Effective Dates Name Provider Type Discipline     06/19/19 -  Lora Benton, PT Physical Therapist PT                PT Recommendation and Plan  Anticipated Discharge Disposition (PT): home with home health  Planned Therapy Interventions (PT Eval): balance  training, bed mobility training, gait training, home exercise program, patient/family education, stair training, strengthening, stretching, transfer training, ROM (range of motion)  Therapy Frequency (PT Clinical Impression): 2 times/day  Outcome Summary/Treatment Plan (PT)  Daily Summary of Progress (PT): progress toward functional goals is good  Anticipated Discharge Disposition (PT): home with home health  Plan of Care Reviewed With: patient  Outcome Summary: Pt tolerates bed mobilty, transfers, gait and ther ex well this date. Difficulty with supine hip abduction and SLR on R due to pain only able to perfom 5-8 of each. Pt reports difficulty with pain control this AM. Recommending home with HHPT. Will continue to follow twice daily.      Time Calculation:   PT Charges     Row Name 10/17/19 1222             Time Calculation    Start Time  1106  -SS      Stop Time  1133  -SS      Time Calculation (min)  27 min  -SS      PT Received On  10/17/19  -         Time Calculation- PT    Total Timed Code Minutes- PT  27 minute(s)  -SS        User Key  (r) = Recorded By, (t) = Taken By, (c) = Cosigned By    Initials Name Provider Type    SS Lora Benton, PT Physical Therapist        Therapy Charges for Today     Code Description Service Date Service Provider Modifiers Qty    85893142117 HC PT EVAL MOD COMPLEXITY 4 10/16/2019 Lora Benton PT GP 1    43237522794 HC GAIT TRAINING EA 15 MIN 10/16/2019 Lora Benton, PT GP 1    48669671448 HC PT THER PROC EA 15 MIN 10/17/2019 Lora Benton PT GP 1    96175278138 HC GAIT TRAINING EA 15 MIN 10/17/2019 Lora Benton PT GP 1    63731851030 HC PT THERAPEUTIC ACT EA 15 MIN 10/17/2019 Lora Benton PT GP 1               Lora Benton PT  10/17/2019

## 2019-10-17 NOTE — PLAN OF CARE
Problem: Patient Care Overview  Goal: Plan of Care Review  Outcome: Ongoing (interventions implemented as appropriate)   10/17/19 1220   OTHER   Outcome Summary Pt tolerates bed mobilty, transfers, gait and ther ex well this date. Difficulty with supine hip abduction and SLR on R due to pain only able to perfom 5-8 of each. Pt reports difficulty with pain control this AM. Recommending home with HHPT. Will continue to follow twice daily.

## 2019-10-17 NOTE — THERAPY TREATMENT NOTE
Acute Care - Physical Therapy Treatment Note   F[facility]ico     Patient Name: Seda Urbina  : 1960  MRN: [id number]  Today's Date: 10/17/2019             Admit Date: 10/16/2019    Visit Dx:    ICD-10-CM ICD-9-CM   1. Primary osteoarthritis of right knee M17.11 715.16     Patient Active Problem List   Diagnosis   • Knee pain, bilateral   • Status post total knee replacement, left   • Tear of deltoid ligament of ankle   • Dislocation of right knee   • Acute pain of right knee   • Seizure-like activity (CMS/HCC)   • History of total knee arthroplasty, left   • Acute medial meniscus tear of right knee   • Chronic pain of left knee       Therapy Treatment    Rehabilitation Treatment Summary     Row Name 10/17/19 1457 10/17/19 1107          Treatment Time/Intention    Document Type  therapy note (daily note)  -SS  therapy note (daily note)  -SS     Mode of Treatment  physical therapy  -SS  physical therapy  -SS     Patient/Family Observations  supine in bed  -SS2  --     Therapy Frequency (PT Clinical Impression)  2 times/day  -SS  2 times/day  -SS     Treatment Considerations/Comments  --  pt requires frequent redirection and clare very distractable  -SS2     Recorded by [SS] Lora Benton, PT 10/17/19 1502  [SS2] Lora Benton, PT 10/17/19 1612 [SS] Lora Benton, PT 10/17/19 1121  [SS2] Lora Benton, PT 10/17/19 1220     Row Name 10/17/19 1107             Cognitive Assessment/Intervention- PT/OT    Orientation Status (Cognition)  oriented x 4  -SS      Recorded by [SS] Lora Benton, PT 10/17/19 1121      Row Name 10/17/19 1457 10/17/19 1107          Bed Mobility Assessment/Treatment    Bed Mobility Assessment/Treatment  bed mobility (all) activities  -SS  bed mobility (all) activities  -SS     [place] Level (Bed Mobility)  independent  -SS  independent  -SS     Comment (Bed Mobility)  --  pt performs supine to sit and sit to supine- no issue with hip flexion  -SS     Recorded  by [SS] Lora Benton, PT 10/17/19 1511 [SS] Lora Benton, PT 10/17/19 1220     Row Name 10/17/19 1107             Transfer Assessment/Treatment    Transfer Assessment/Treatment  sit-stand transfer  -SS      Comment (Transfers)  performs trsnfers to and from bed, to and from toilet. Cues for proximity to toilet prior to sitting  -SS      Recorded by [SS] Lora Benton, PT 10/17/19 1220      Row Name 10/17/19 1457 10/17/19 1107          Sit-Stand Transfer    Sit-Stand [place] (Transfers)  conditional independence  -SS  supervision;contact guard  -SS     Assistive Device (Sit-Stand Transfers)  walker, front-wheeled  -SS  --     Recorded by [SS] Lora Benton, PT 10/17/19 1511 [SS] Lora Benton, PT 10/17/19 1220     Row Name 10/17/19 1457 10/17/19 1107          Gait/Stairs Assessment/Training    [place] Level (Gait)  supervision  -SS  supervision;contact guard  -SS     Assistive Device (Gait)  walker, front-wheeled  -SS  walker, front-wheeled  -SS     Distance in Feet (Gait)  200  -SS  100  -SS     [place] Level (Stairs)  supervision  -SS2  --     Handrail Location (Stairs)  both sides  -SS  --     Number of Steps (Stairs)  4  -SS  --     Ascending Technique (Stairs)  step-to-step  -SS  --     Descending Technique (Stairs)  step-to-step  -SS  --     Comment (Gait/Stairs)  --  cues for increasing knee flexion, pt with early heel off on R LE- cues to improve  -SS     Recorded by [SS] Lora Benton, PT 10/17/19 1511  [SS2] Lora Benton, PT 10/17/19 1612 [SS] Lora Benton, PT 10/17/19 1220     Row Name 10/17/19 1457             General ROM    GENERAL ROM COMMENTS  R knee 0- 80 deg   -SS      Recorded by [SS] Lora Benton, PT 10/17/19 1511      Row Name 10/17/19 1107             Motor Skills Assessment/Interventions    Additional Documentation  Therapeutic Exercise (Group);Therapeutic Exercise Interventions (Group)  -SS      Recorded by [SS]  Lora Benton, PT 10/17/19 1121      Row Name 10/17/19 1107             Therapeutic Exercise    Therapeutic Exercise  supine, lower extremities;seated, lower extremities  -SS      Additional Documentation  Therapeutic Exercise (Row)  -SS      Recorded by [SS] Lora Benton, PT 10/17/19 1121      Row Name 10/17/19 1107             Lower Extremity Seated Therapeutic Exercise    Performed, Seated Lower Extremity (Therapeutic Exercise)  LAQ (long arc quad), knee extension  -SS      Sets/Reps Detail, Seated Lower Extremity (Therapeutic Exercise)  10 reps  -SS      Recorded by [SS] Lora Benton, PT 10/17/19 1121      Row Name 10/17/19 1457 10/17/19 1107          Lower Extremity Supine Therapeutic Exercise    Performed, Supine Lower Extremity (Therapeutic Exercise)  ankle pumps;heel slides;SLR (straight leg raise);hip abduction/adduction  -SS  ankle pumps;heel slides;SLR (straight leg raise)  -SS     Sets/Reps Detail, Supine Lower Extremity (Therapeutic Exercise)  10 reps  -SS  10 reps  -SS     Recorded by [SS] Lora Benton, PT 10/17/19 1511 [SS] Lora Benton, PT 10/17/19 1121     Row Name 10/17/19 1457 10/17/19 1107          Positioning and Restraints    Pre-Treatment Position  in bed  -SS  in bed  -SS     Post Treatment Position  bed  -SS  bed  -SS     Recorded by [SS] Lora Benton, PT 10/17/19 1612 [SS] Lora Benton, PT 10/17/19 1220     Row Name 10/17/19 1457 10/17/19 1107          Pain Scale: FACES Pre/Post-Treatment    Pain: FACES Scale, Pretreatment  0-->no hurt  -SS  0-->no hurt  -SS     Pain: FACES Scale, Post-Treatment  4-->hurts little more  -SS  4-->hurts little more  -SS     Recorded by [SS] Lora Benton, PT 10/17/19 1612 [SS] Lora Benton, PT 10/17/19 1220     Row Name                Wound 10/16/19 0953 Right knee Incision    Wound - Properties Group Date first assessed: 10/16/19 [BS] Time first assessed: 0953 [BS] Side: Right [BS] Location: knee  [BS] Primary Wound Type: Incision [BS] Recorded by:  [BS] Hermelindo Barroso RN 10/16/19 0953    Row Name 10/17/19 1457 10/17/19 1200          Outcome Summary/Treatment Plan (PT)    Daily Summary of Progress (PT)  progress toward functional goals is good  -  progress toward functional goals is good  -     Anticipated Discharge Disposition (PT)  home with home health  -  home with home health  -     Recorded by [SS] Lora Benton, PT 10/17/19 1612 [SS] Lora Benton, PT 10/17/19 1220       User Key  (r) = Recorded By, (t) = Taken By, (c) = Cosigned By    Initials Name Effective Dates Discipline     Lora Benton, PT 06/19/19 -  PT    BS Hermelindo Barroso RN 03/01/19 -  Nurse          Wound 10/16/19 0953 Right knee Incision (Active)   Dressing Appearance dry;intact;no drainage 10/17/2019 11:08 AM   Closure SHABBIR 10/17/2019 11:08 AM   Drainage Amount none 10/16/2019  7:15 PM           Physical Therapy Education     Title: PT OT SLP Therapies (Resolved)     Topic: Physical Therapy (Resolved)     Point: Mobility training (Resolved)     Learning Progress Summary           Patient Acceptance, E, VU by  at 10/17/2019  4:14 PM    Acceptance, E, VU by  at 10/17/2019 12:22 PM    Acceptance, E, VU by  at 10/16/2019  5:25 PM                   Point: Home exercise program (Resolved)     Learning Progress Summary           Patient Acceptance, E, VU by  at 10/17/2019  4:14 PM                               User Key     Initials Effective Dates Name Provider Type Discipline     06/19/19 -  Lora Benton, PT Physical Therapist PT                PT Recommendation and Plan  Anticipated Discharge Disposition (PT): home with home health  Planned Therapy Interventions (PT Eval): balance training, bed mobility training, gait training, home exercise program, patient/family education, stair training, strengthening, stretching, transfer training, ROM (range of motion)  Therapy Frequency (PT Clinical  Impression): 2 times/day  Outcome Summary/Treatment Plan (PT)  Daily Summary of Progress (PT): progress toward functional goals is good  Anticipated Discharge Disposition (PT): home with home health  Plan of Care Reviewed With: patient  Outcome Summary: Patient able to tolerate 200' ambulation and stair negotiation. Also performs TKA exercises. knee ROM is decreased compared to POD 0. Pt has met PT goals and is safe to d/c home with HHPT.      Time Calculation:   PT Charges     Row Name 10/17/19 1614 10/17/19 1222          Time Calculation    Start Time  1458  -SS  1106  -SS     Stop Time  1522  -SS  1133  -SS     Time Calculation (min)  24 min  -SS  27 min  -SS     PT Received On  10/17/19  -SS  10/17/19  -SS        Time Calculation- PT    Total Timed Code Minutes- PT  24 minute(s)  -SS  27 minute(s)  -SS       User Key  (r) = Recorded By, (t) = Taken By, (c) = Cosigned By    Initials Name Provider Type     Lora Benton, PT Physical Therapist        Therapy Charges for Today     Code Description Service Date Service Provider Modifiers Qty    76138351283 HC PT EVAL MOD COMPLEXITY 4 10/16/2019 Lora Benton, PT GP 1    19106590378 HC GAIT TRAINING EA 15 MIN 10/16/2019 Lora Benton, PT GP 1    79697918075 HC PT THER PROC EA 15 MIN 10/17/2019 Lora Benton, PT GP 1    41193806810 HC GAIT TRAINING EA 15 MIN 10/17/2019 Lora Benton, PT GP 1    91990193428 HC PT THERAPEUTIC ACT EA 15 MIN 10/17/2019 Lora Benton PT GP 1    86158821003 HC GAIT TRAINING EA 15 MIN 10/17/2019 Lora Benton, PT GP 1    66929446225 HC PT THER PROC EA 15 MIN 10/17/2019 Lora Benton, PT GP 1    23848349438 HC PT THERAPEUTIC ACT EA 15 MIN 10/17/2019 Lora Benton, PT GP 1               Lora Benton PT  10/17/2019

## 2019-10-17 NOTE — PLAN OF CARE
Problem: Patient Care Overview  Goal: Plan of Care Review  Outcome: Ongoing (interventions implemented as appropriate)    Goal: Individualization and Mutuality  Outcome: Ongoing (interventions implemented as appropriate)    Goal: Discharge Needs Assessment  Outcome: Ongoing (interventions implemented as appropriate)    Goal: Interprofessional Rounds/Family Conf  Outcome: Ongoing (interventions implemented as appropriate)      Problem: Knee Arthroplasty (Total, Partial) (Adult)  Goal: Signs and Symptoms of Listed Potential Problems Will be Absent, Minimized or Managed (Knee Arthroplasty)  Outcome: Ongoing (interventions implemented as appropriate)    Goal: Anesthesia/Sedation Recovery  Outcome: Ongoing (interventions implemented as appropriate)      Problem: Anxiety (Adult)  Goal: Identify Related Risk Factors and Signs and Symptoms  Outcome: Ongoing (interventions implemented as appropriate)    Goal: Reduction/Resolution  Outcome: Ongoing (interventions implemented as appropriate)      Problem: Fall Risk (Adult)  Goal: Identify Related Risk Factors and Signs and Symptoms  Outcome: Outcome(s) achieved Date Met: 10/17/19    Goal: Absence of Fall  Outcome: Ongoing (interventions implemented as appropriate)

## 2019-10-18 ENCOUNTER — TELEPHONE (OUTPATIENT)
Dept: ORTHOPEDIC SURGERY | Facility: CLINIC | Age: 59
End: 2019-10-18

## 2019-10-18 NOTE — PROGRESS NOTES
Case Management Discharge Note    Final Note: home with [facility]      Final Discharge Disposition Code: 06 - home with home health care

## 2019-10-18 NOTE — TELEPHONE ENCOUNTER
"Patient called and said she was \"suffering\" and she has had several surgeries and has never had this low of a dose for her pain medication before. I called and s/w Dr. Almaraz and told him that patient was discharged home with Oxycodone 5-325 1-2 PO Q4 hours PRN pain and Dr. Almaraz said he wanted me to call in Ultram 50mg 1 po Q6 hours PRN Pain #30 No RF. I called Rx into [facility]. I tried calling patient back and she was very unhappy that Dr. Almaraz only wanted to call in Tramadol 50mg.   "

## 2019-10-21 ENCOUNTER — TELEPHONE (OUTPATIENT)
Dept: SURGERY | Facility: HOSPITAL | Age: 59
End: 2019-10-21

## 2019-10-21 RX ORDER — OXYCODONE HYDROCHLORIDE AND ACETAMINOPHEN 5; 325 MG/1; MG/1
TABLET ORAL
Qty: 40 TABLET | Refills: 0 | Status: SHIPPED | OUTPATIENT
Start: 2019-10-21 | End: 2019-10-22 | Stop reason: SDUPTHER

## 2019-10-21 NOTE — TELEPHONE ENCOUNTER
That is the correct advice.  The patient had promised me that she would be attached to a pain clinic in [place], [place] prior to her surgical intervention.  I have already given her a prescription of Percocet.  I will be able to refill her Percocet when I get to the clinic tomorrow, Tuesday, 22nd October.

## 2019-10-22 RX ORDER — OXYCODONE HYDROCHLORIDE AND ACETAMINOPHEN 5; 325 MG/1; MG/1
TABLET ORAL
Qty: 40 TABLET | Refills: 0 | Status: SHIPPED | OUTPATIENT
Start: 2019-10-22 | End: 2019-10-29 | Stop reason: SDUPTHER

## 2019-10-22 NOTE — TELEPHONE ENCOUNTER
I have just sent in the patient's medications through [facility].  It is okay for her to have a shower chair as well.  Thank you

## 2019-10-23 PROBLEM — F41.9 ANXIETY: Status: ACTIVE | Noted: 2019-10-23

## 2019-10-23 PROBLEM — I21.9 HEART ATTACK (HCC): Status: ACTIVE | Noted: 2019-10-23

## 2019-10-24 ENCOUNTER — TELEPHONE (OUTPATIENT)
Dept: ORTHOPEDIC SURGERY | Facility: CLINIC | Age: 59
End: 2019-10-24

## 2019-10-24 NOTE — TELEPHONE ENCOUNTER
I called to [facility] to try to track down patients home health order because patient states she has never heard anything. I called and s/w Marisa and she said they are out of [place] and they do not go to [place] for Home health. She told me to try to call [facility] to see if they go out to [place]. I got bellaold of [facility] @ [phone] and s/w Daniela and she said I needed to fax over patient information and she would get patient scheduled. I manually faxed order and called and notified patient.   Fax # [phone]      While talking to patient she said her bandage fell off today while she was in the shower. She said she clean the incision and covered it back up. I told her do not touch it anymore that we will clean it and change the bandage tomorrow.

## 2019-10-25 ENCOUNTER — OFFICE VISIT (OUTPATIENT)
Dept: ORTHOPEDIC SURGERY | Facility: CLINIC | Age: 59
End: 2019-10-25

## 2019-10-25 VITALS
BODY MASS INDEX: 28.06 KG/M2 | SYSTOLIC BLOOD PRESSURE: 116 MMHG | HEIGHT: 66 IN | HEART RATE: 85 BPM | DIASTOLIC BLOOD PRESSURE: 75 MMHG | WEIGHT: 174.6 LBS

## 2019-10-25 DIAGNOSIS — E66.3 OVERWEIGHT (BMI 25.0-29.9): ICD-10-CM

## 2019-10-25 DIAGNOSIS — Z96.651 HX OF TOTAL KNEE REPLACEMENT, RIGHT: Primary | ICD-10-CM

## 2019-10-25 DIAGNOSIS — F11.20 NARCOTIC DEPENDENCY, CONTINUOUS (HCC): ICD-10-CM

## 2019-10-25 DIAGNOSIS — T81.49XA POSTOPERATIVE WOUND CELLULITIS: ICD-10-CM

## 2019-10-25 PROCEDURE — 99024 POSTOP FOLLOW-UP VISIT: CPT | Performed by: PHYSICIAN ASSISTANT

## 2019-10-25 RX ORDER — MULTIVITAMIN
1 TABLET ORAL DAILY
COMMUNITY
Start: 2013-10-03

## 2019-10-25 RX ORDER — DOXYCYCLINE HYCLATE 100 MG/1
100 TABLET, DELAYED RELEASE ORAL 2 TIMES DAILY
Qty: 20 TABLET | Refills: 0 | Status: SHIPPED | OUTPATIENT
Start: 2019-10-25 | End: 2019-11-04

## 2019-10-25 NOTE — TELEPHONE ENCOUNTER
Daniela from [facility] called me today and said they don't have enough staff members (PT) to be able to go out anytime soon to be able to go out to patients house for Home Health. They told me to try to call [facility].    Called [facility] @ [phone] and asked them what their referral process was. She told me to fax patients records and demographics to [phone] and they would contact patient.

## 2019-10-25 NOTE — PATIENT INSTRUCTIONS
Total Knee Replacement, Care After  These instructions give you information about caring for yourself after your procedure. Your doctor may also give you more specific instructions. Call your doctor if you have any problems or questions after your procedure.  Follow these instructions at home:  Medicines  · Take over-the-counter and prescription medicines only as told by your doctor.  · If you were prescribed an antibiotic medicine, take it as told by your doctor. Do not stop taking the antibiotic even if you start to feel better.  · If you were prescribed a blood thinner (anticoagulant), take it as told by your doctor.  Bathing  · Do not take baths, swim, or use a hot tub until your doctor says it is okay. Ask your doctor if you can take showers. You may only be allowed to take sponge baths for bathing.  · If you have a splint or brace that is not waterproof, cover it with a watertight covering when you take a bath or a shower.  · Keep your bandage (dressing) dry until your doctor says it can be taken off.  Incision care and drain care    · Check your cut from surgery (incision) and your drain every day for signs of infection. Check for:  ? More redness, swelling, or pain.  ? More fluid or blood.  ? Warmth.  ? Pus or a bad smell.  · Follow instructions from your doctor about how to take care of your cut from surgery. Make sure you:  ? Wash your hands with soap and water before you change your bandage. If you cannot use soap and water, use hand .  ? Change your bandage as told by your doctor.  ? Leave stitches (sutures), skin glue, or skin tape (adhesive) strips in place. They may need to stay in place for 2 weeks or longer. If tape strips get loose and curl up, you may trim the loose edges. Do not remove tape strips completely unless your doctor says it is okay.  · If you have a drain, follow instructions from your doctor about caring for it. Do not remove the drain tube or any bandages unless your doctor  says it is okay.  Managing pain, stiffness, and swelling    · If directed, put ice on your knee.  ? Put ice in a plastic bag or use the icing device (cold flow pad or cryocuff) that you were given. Follow your doctor's directions about how to use the icing device.  ? Place a towel between your skin and the bag, or between your skin and the device.  ? Leave the ice on for 20 minutes, 2-3 times per day.  · If directed, apply heat to the affected area as often as told by your doctor. Use the heat source that your doctor recommends, such as a moist heat pack or a heating pad.  ? Place a towel between your skin and the heat source.  ? Leave the heat on for 20-30 minutes.  ? Remove the heat if your skin turns bright red. This is especially important if you are unable to feel pain, heat, or cold. You may have a greater risk of getting burned.  · Move your toes often to avoid stiffness and to lessen swelling.  · Raise (elevate) your knee above the level of your heart while you are sitting or lying down.  · Wear elastic knee support for as long as told by your doctor.  Driving  · Do not drive until your doctor says it is okay. Ask your doctor when it is safe to drive if you have a splint or brace on your knee.  · Do not drive or use heavy machinery while taking prescription pain medicine.  · Do not drive for 24 hours if you received a sedative.  Activity  · Do not play contact sports until your doctor says it is okay.  · Avoid high-impact activities, including running, jumping rope, and jumping jacks.  · Avoid sitting for a long time without moving. Get up and move around at least every few hours.  · If physical therapy was prescribed, do exercises as told by your doctor.  · Return to your normal activities as told by your doctor. Ask your doctor what activities are safe for you.  Safety  · Do not use your leg to support your body weight until your doctor says that you can. Use crutches or a walker as told by your  doctor.  General instructions  · Do not have any dental work done for at least 3 months after your surgery. When you do have dental work done, tell your dentist about your joint replacement.  · Do not use any tobacco products, such as cigarettes, chewing tobacco, or e-cigarettes. If you need help quitting, ask your doctor.  · Wear special socks (compression stockings) as told by your doctor.  · If you have been sent home with a knee joint motion machine (continuous passive motion machine), use it as told by your doctor.  · Drink enough fluid to keep your pee (urine) clear or pale yellow.  · If you have been told to lose weight, follow instructions from your doctor about how to do this safely.  · Keep all follow-up visits as told by your doctor. This is important.  Contact a doctor if:  · You have more redness, swelling, or pain around your cut from surgery or your drain.  · You have more fluid or blood coming from your cut from surgery or your drain.  · Your cut from surgery or your drain area feels warm to the touch.  · You have pus or a bad smell coming from your cut from surgery or your drain.  · You have a fever.  · Your cut breaks open after your doctor removes your stitches, skin glue, or skin tape strips.  · Your new joint feels loose.  · You have knee pain that does not go away.  Get help right away if:  · You have a rash.  · You have pain in your calf or thigh.  · You have swelling in your calf or thigh.  · You have shortness of breath.  · You have trouble breathing.  · You have chest pain.  · Your ability to move your knee is getting worse.  This information is not intended to replace advice given to you by your health care provider. Make sure you discuss any questions you have with your health care provider.  Document Released: 03/11/2013 Document Revised: 02/05/2018 Document Reviewed: 11/23/2016  [employer] Interactive Patient Education © 2019 [employer]      Preventing Health Risks of Being  Overweight  Maintaining a healthy body weight is an important part of your overall health. Your healthy body weight depends on your age, gender, and height. Being overweight puts you at risk for many health problems, including:  · Heart disease.  · Diabetes.  · Problems sleeping.  · Joint problems.  You can make changes to your diet and lifestyle to prevent these risks. Consider working with a health care provider or a dietitian to make these changes.  What nutrition changes can be made?    · Eat only as much as your body needs. In most cases, this is about 2,000 calories a day, but the amount varies depending on your height, gender, and activity level. Ask your health care provider how many calories you should have each day. Eating more than your body needs on a regular basis can cause you to become overweight or obese.  · Eat slowly, and stop eating when you feel full.  · Choose healthy foods, including:  ? Fruits and vegetables.  ? Lean meats.  ? Low-fat dairy products.  ? High-fiber foods, such as whole grains and beans.  ? Healthy snacks like vegetable sticks, a piece of fruit, or a small amount of yogurt or cheese.  · Avoid foods and drinks that are high in sugar, salt (sodium), saturated fat, or trans fat. This includes:  ? Many desserts such as candy, cookies, and ice cream.  ? Soda.  ? Fried foods.  ? Processed meats such as hot dogs or lunch meats.  ? Prepackaged snack foods.  What lifestyle changes can be made?    · Exercise for at least 150 minutes a week to prevent weight gain, or as often as recommended by your health care provider. Do moderate-intensity exercise, such as brisk walking.  ? Spread it out by exercising for 30 minutes 5 days a week, or in short 10-minute bursts several times a day.  · Find other ways to stay active and burn calories, such as yard work or a hobby that involves physical activity.  · Get at least 8 hours of sleep each night. When you are well-rested, you are more likely to  be active and make healthy choices during the day. To sleep better:  ? Try to go to bed and wake up at about the same time every day.  ? Keep your bedroom dark, quiet, and cool.  ? Make sure that your bed is comfortable.  ? Avoid stimulating activities, such as watching television or exercising, for at least one hour before bedtime.  Why are these changes important?  Eating healthy and being active helps you lose weight and prevent health problems caused by being overweight. Making these changes can also help you manage stress, feel better mentally, and connect with friends and family.  What can happen if changes are not made?  Being overweight can affect you for your entire life. You may develop joint or bone problems that make it painful or difficult for you to play sports or do activities you enjoy. Being overweight puts stress on your heart and lungs and can lead to medical problems like diabetes, heart disease, and sleeping problems.  Where to find support  You can get support for preventing health risks of being overweight from:  · Your health care provider or a dietitian. They can provide guidance about healthy eating and healthy lifestyle choices.  · Weight loss support groups, online or in-person.  Where to find more information  · MyPlate: www.choosemyplate.gov  ? This an online tool that provides personalized recommendations about foods to eat each day.  · The Centers for Disease Control and Prevention: www.cdc.gov/healthyweight  ? This resource gives tips for managing weight and having an active lifestyle.  Summary  · To prevent unhealthy weight gain, it is important to maintain a healthy diet high in vegetables and whole grains, exercise regularly, and get at least 8 hours of sleep each night.  · Making these changes helps prevent many long-term (chronic) health conditions that can shorten your life, such as diabetes, heart disease, and stroke.  This information is not intended to replace advice given  to you by your health care provider. Make sure you discuss any questions you have with your health care provider.  Document Released: 11/14/2018 Document Revised: 11/14/2018 Document Reviewed: 11/14/2018  [employer]vier Interactive Patient Education © 2019 [employer] Inc.

## 2019-10-25 NOTE — PROGRESS NOTES
ORTHO POSTOP VISIT       Subjective:    HPI:  Seda Urbina is a 59 y.o. female who presents about 1 week out from having a right total knee replacement.  She reports that she is doing well with mild intermittent discomfort.  She reports that she is never been contacted by the home health agency that she was set up with, but she has been in contact with our office and it is being sorted.  She reports that her surgical dressing just fell off yesterday on its own.  She states that she cleaned it and covered it with gauze and tape.  She is accompanied by her /significant other today.    Medications:    Current Outpatient Medications:   •  ALPRAZolam (XANAX) 0.25 MG tablet, Take 2 mg by mouth 3 (Three) Times a Day As Needed for Anxiety., Disp: , Rfl:   •  amLODIPine (NORVASC) 10 MG tablet, 1 tablet once daily, Disp: , Rfl: 6  •  baclofen (LIORESAL) 10 MG tablet, TK 1 T PO BID PRN, Disp: , Rfl: 1  •  estradiol (ESTRACE) 0.5 MG tablet, Takes 2 mg once daily, Disp: , Rfl: 0  •  gabapentin (NEURONTIN) 800 MG tablet, Take 800 mg by mouth 3 (Three) Times a Day., Disp: , Rfl:   •  losartan-hydrochlorothiazide (HYZAAR) 100-25 MG per tablet, Take 1 tablet by mouth Daily., Disp: , Rfl:   •  meloxicam (MOBIC) 7.5 MG tablet, Take 1 tablet by mouth Daily., Disp: 12 tablet, Rfl: 0  •  Multiple Vitamin (MULTIVITAMIN) tablet, Take 1 tablet by mouth Daily., Disp: , Rfl:   •  nicotine (NICODERM CQ) 21 MG/24HR patch, Place 1 patch on the skin as directed by provider Daily for 14 days., Disp: 14 patch, Rfl: 0  •  omeprazole (PriLOSEC) 20 MG capsule, 1 tablet once daily, Disp: , Rfl: 0  •  oxybutynin XL (DITROPAN XL) 15 MG 24 hr tablet, 15 mg Daily., Disp: , Rfl: 1  •  oxyCODONE-acetaminophen (PERCOCET) 5-325 MG per tablet, 1-2 po q4 hours prn pain, Disp: 40 tablet, Rfl: 0  •  rivaroxaban (XARELTO) 10 MG tablet, Take 1 tablet by mouth Daily With Dinner., Disp: 12 tablet, Rfl: 0  •  traZODone (DESYREL) 100 MG tablet, TK 2 TS  "PO QHS prn, Disp: , Rfl: 2  •  doxycycline (DORYX) 100 MG enteric coated tablet, Take 1 tablet by mouth 2 (Two) Times a Day for 10 days., Disp: 20 tablet, Rfl: 0  •  ferrous sulfate 324 (65 Fe) MG tablet delayed-release EC tablet, Take 1 tablet by mouth Daily With Breakfast., Disp: 14 tablet, Rfl: 0  Current outpatient and discharge medications have been reconciled for the patient.  Reviewed by: COTY Weiss      Allergies:  Allergies   Allergen Reactions   • Keflex [Cephalexin] Swelling          Objective   Objective:    /75   Pulse 85   Ht 167.6 cm (66\")   Wt 79.2 kg (174 lb 9.6 oz)   BMI 28.18 kg/m²     Physical Examination:  Alert, oriented, overweight individual who appears disheveled and intoxicated, ambulating with the assistance of a walker  Right lower extremity shows a well-healing surgical incision with no drainage, or open skin lesions.  There is some mild erythema surrounding the incision extending slightly medially.  There is a mild to moderate amount of swelling with effusion, negative Matilda's. It is grossly well aligned, and the patient is neurovascularly intact distally. The knee is stable to varus and valgus stress, there is no patellar maltracking or crepitus noted, and plantar and dorsiflexion is 5/5. There is minimal tenderness to palpation and with range of motion, which is about 5-90.         Imaging:  xrays to be obtained at next visit            Assessment:  1. Hx of total knee replacement, right    2. Postoperative wound cellulitis    3. Overweight (BMI 25.0-29.9)    4. Narcotic dependency, continuous (CMS/HCC)       About 1 week out from surgery          Plan:  Her incision was cleansed thoroughly with chlorhexidine soap and saline solution and redressed with a bordered Mepilex silver dressing.  This dressing should be left in place until the patient is seen back in 1 week.  She may shower with the dressing in place.  She was instructed not to remove the dressing, she may " reinforce it with tape if needed.  The patient was witnessed touching her incision multiple times after being asked not to.  I had a long discussion with her today about compliance.  She will be started on doxycycline for the cellulitis.  She denies any associated symptoms of infection, and is to call immediately if she develops any.  Continue ice, elevation, and DVT prophylaxis.  Her leg was wrapped in new Ace wraps today.  I will plan to see her back in 1 week with imaging at that time.             COTY Weiss  10/25/19  11:31 AM

## 2019-10-29 DIAGNOSIS — Z96.651 HX OF TOTAL KNEE REPLACEMENT, RIGHT: Primary | ICD-10-CM

## 2019-10-29 RX ORDER — OXYCODONE HYDROCHLORIDE AND ACETAMINOPHEN 5; 325 MG/1; MG/1
TABLET ORAL
Qty: 40 TABLET | Refills: 0 | Status: SHIPPED | OUTPATIENT
Start: 2019-10-29 | End: 2019-10-29

## 2019-10-29 RX ORDER — OXYCODONE HYDROCHLORIDE AND ACETAMINOPHEN 5; 325 MG/1; MG/1
TABLET ORAL
Qty: 40 TABLET | Refills: 0 | Status: SHIPPED | OUTPATIENT
Start: 2019-10-29 | End: 2019-11-04 | Stop reason: ALTCHOICE

## 2019-10-31 ENCOUNTER — OFFICE VISIT (OUTPATIENT)
Dept: ORTHOPEDIC SURGERY | Facility: CLINIC | Age: 59
End: 2019-10-31

## 2019-10-31 VITALS
BODY MASS INDEX: 25.91 KG/M2 | SYSTOLIC BLOOD PRESSURE: 120 MMHG | WEIGHT: 161.2 LBS | HEIGHT: 66 IN | HEART RATE: 64 BPM | DIASTOLIC BLOOD PRESSURE: 67 MMHG

## 2019-10-31 DIAGNOSIS — Z96.651 HX OF TOTAL KNEE REPLACEMENT, RIGHT: Primary | ICD-10-CM

## 2019-10-31 DIAGNOSIS — E66.3 OVERWEIGHT (BMI 25.0-29.9): ICD-10-CM

## 2019-10-31 DIAGNOSIS — F11.20 NARCOTIC DEPENDENCY, CONTINUOUS (HCC): ICD-10-CM

## 2019-10-31 PROCEDURE — 99024 POSTOP FOLLOW-UP VISIT: CPT | Performed by: PHYSICIAN ASSISTANT

## 2019-10-31 NOTE — PROGRESS NOTES
ORTHO POSTOP VISIT       Subjective:    HPI:  Seda Urbina is a 59 y.o. female who presents about 2 weeks out from having a right total knee replacement.  She reports that she is doing well with mild to moderate intermittent pain.  She is pleased with her results thus far.    Medications:    Current Outpatient Medications:   •  ALPRAZolam (XANAX) 0.25 MG tablet, Take 2 mg by mouth 3 (Three) Times a Day As Needed for Anxiety., Disp: , Rfl:   •  amLODIPine (NORVASC) 10 MG tablet, 1 tablet once daily, Disp: , Rfl: 6  •  baclofen (LIORESAL) 10 MG tablet, TK 1 T PO BID PRN, Disp: , Rfl: 1  •  doxycycline (DORYX) 100 MG enteric coated tablet, Take 1 tablet by mouth 2 (Two) Times a Day for 10 days., Disp: 20 tablet, Rfl: 0  •  ferrous sulfate 324 (65 Fe) MG tablet delayed-release EC tablet, Take 1 tablet by mouth Daily With Breakfast., Disp: 14 tablet, Rfl: 0  •  gabapentin (NEURONTIN) 800 MG tablet, Take 800 mg by mouth 3 (Three) Times a Day., Disp: , Rfl:   •  losartan-hydrochlorothiazide (HYZAAR) 100-25 MG per tablet, Take 1 tablet by mouth Daily., Disp: , Rfl:   •  meloxicam (MOBIC) 7.5 MG tablet, Take 1 tablet by mouth Daily., Disp: 12 tablet, Rfl: 0  •  Multiple Vitamin (MULTIVITAMIN) tablet, Take 1 tablet by mouth Daily., Disp: , Rfl:   •  nicotine (NICODERM CQ) 21 MG/24HR patch, Place 1 patch on the skin as directed by provider Daily for 14 days., Disp: 14 patch, Rfl: 0  •  omeprazole (PriLOSEC) 20 MG capsule, 1 tablet once daily, Disp: , Rfl: 0  •  oxybutynin XL (DITROPAN XL) 15 MG 24 hr tablet, 15 mg Daily., Disp: , Rfl: 1  •  oxyCODONE-acetaminophen (PERCOCET) 5-325 MG per tablet, 1-2 po q6 hours prn pain, Disp: 40 tablet, Rfl: 0  •  rivaroxaban (XARELTO) 10 MG tablet, Take 1 tablet by mouth Daily With Dinner., Disp: 12 tablet, Rfl: 0  •  traZODone (DESYREL) 100 MG tablet, TK 2 TS PO QHS prn, Disp: , Rfl: 2  •  Misc. Devices (3-IN-1 BEDSIDE TOILET) misc, 1 Units 1 (One) Time for 1 dose., Disp: 1 each,  "Rfl: 0  Current outpatient and discharge medications have been reconciled for the patient.  Reviewed by: COTY Weiss      Allergies:  Allergies   Allergen Reactions   • Keflex [Cephalexin] Swelling          Objective   Objective:    /67   Pulse 64   Ht 167.6 cm (66\")   Wt 73.1 kg (161 lb 3.2 oz)   BMI 26.02 kg/m²     Physical Examination:  Alert, oriented, overweight individual in no acute distress, ambulating with the assistance of a walker  Right lower extremity shows a well-healing surgical incision with no erythema, drainage, or open skin lesions.  There is some skin discoloration in the area of the incision.  There is a minimal amount of swelling. It is grossly well aligned, and the patient is neurovascularly intact distally. The knee is stable to varus and valgus stress, there is no patellar maltracking or crepitus noted, and plantar and dorsiflexion is 5/5. There is mild tenderness to palpation and with range of motion, which is about 2-100.         Imaging:  xrays obtained today  right Knee X-Ray  Indication: 2 weeks status post right total knee replacement  AP, Lateral, [place] views  Findings:A well positioned knee replacement without evidence of bony or implant failure. and No fractures or dislocations are appreciated  within normal limits joint spaces  Hardware appropriately positioned yes    yes prior studies available for comparison.    This patient's x-ray report was graded according to the Kellgren and Cory classification.  This took into account the joint space narrowing, osteophyte formation, sclerosis of the distal femur/proximal tibia along with deformity of those bones.  The findings were indicative of K L grade na.    X-RAY was ordered and reviewed by COTY Weiss            Assessment:  1. Hx of total knee replacement, right    2. Overweight (BMI 25.0-29.9)    3. Narcotic dependency, continuous (CMS/HCC)       About 2 weeks out from surgery          Plan:  At " this time, we will plan to see the patient back in about 6 weeks. The patient should continue PT, WBAT, and call with any problems.  She will be given a generic order to start outpatient physical therapy.             COTY Weiss  10/31/19  2:00 PM

## 2019-10-31 NOTE — PATIENT INSTRUCTIONS
Total Knee Replacement, Care After  These instructions give you information about caring for yourself after your procedure. Your doctor may also give you more specific instructions. Call your doctor if you have any problems or questions after your procedure.  Follow these instructions at home:  Medicines  · Take over-the-counter and prescription medicines only as told by your doctor.  · If you were prescribed an antibiotic medicine, take it as told by your doctor. Do not stop taking the antibiotic even if you start to feel better.  · If you were prescribed a blood thinner (anticoagulant), take it as told by your doctor.  Bathing  · Do not take baths, swim, or use a hot tub until your doctor says it is okay. Ask your doctor if you can take showers. You may only be allowed to take sponge baths for bathing.  · If you have a splint or brace that is not waterproof, cover it with a watertight covering when you take a bath or a shower.  · Keep your bandage (dressing) dry until your doctor says it can be taken off.  Incision care and drain care    · Check your cut from surgery (incision) and your drain every day for signs of infection. Check for:  ? More redness, swelling, or pain.  ? More fluid or blood.  ? Warmth.  ? Pus or a bad smell.  · Follow instructions from your doctor about how to take care of your cut from surgery. Make sure you:  ? Wash your hands with soap and water before you change your bandage. If you cannot use soap and water, use hand .  ? Change your bandage as told by your doctor.  ? Leave stitches (sutures), skin glue, or skin tape (adhesive) strips in place. They may need to stay in place for 2 weeks or longer. If tape strips get loose and curl up, you may trim the loose edges. Do not remove tape strips completely unless your doctor says it is okay.  · If you have a drain, follow instructions from your doctor about caring for it. Do not remove the drain tube or any bandages unless your doctor  says it is okay.  Managing pain, stiffness, and swelling         · If directed, put ice on your knee.  ? Put ice in a plastic bag or use the icing device (cold flow pad or cryocuff) that you were given. Follow your doctor's directions about how to use the icing device.  ? Place a towel between your skin and the bag, or between your skin and the device.  ? Leave the ice on for 20 minutes, 2-3 times per day.  · If directed, apply heat to the affected area as often as told by your doctor. Use the heat source that your doctor recommends, such as a moist heat pack or a heating pad.  ? Place a towel between your skin and the heat source.  ? Leave the heat on for 20-30 minutes.  ? Remove the heat if your skin turns bright red. This is especially important if you are unable to feel pain, heat, or cold. You may have a greater risk of getting burned.  · Move your toes often to avoid stiffness and to lessen swelling.  · Raise (elevate) your knee above the level of your heart while you are sitting or lying down.  · Wear elastic knee support for as long as told by your doctor.  Driving  · Do not drive until your doctor says it is okay. Ask your doctor when it is safe to drive if you have a splint or brace on your knee.  · Do not drive or use heavy machinery while taking prescription pain medicine.  · Do not drive for 24 hours if you received a sedative.  Activity  · Do not play contact sports until your doctor says it is okay.  · Avoid high-impact activities, including running, jumping rope, and jumping jacks.  · Avoid sitting for a long time without moving. Get up and move around at least every few hours.  · If physical therapy was prescribed, do exercises as told by your doctor.  · Return to your normal activities as told by your doctor. Ask your doctor what activities are safe for you.  Safety  · Do not use your leg to support your body weight until your doctor says that you can. Use crutches or a walker as told by your  doctor.  General instructions  · Do not have any dental work done for at least 3 months after your surgery. When you do have dental work done, tell your dentist about your joint replacement.  · Do not use any tobacco products, such as cigarettes, chewing tobacco, or e-cigarettes. If you need help quitting, ask your doctor.  · Wear special socks (compression stockings) as told by your doctor.  · If you have been sent home with a knee joint motion machine (continuous passive motion machine), use it as told by your doctor.  · Drink enough fluid to keep your pee (urine) clear or pale yellow.  · If you have been told to lose weight, follow instructions from your doctor about how to do this safely.  · Keep all follow-up visits as told by your doctor. This is important.  Contact a doctor if:  · You have more redness, swelling, or pain around your cut from surgery or your drain.  · You have more fluid or blood coming from your cut from surgery or your drain.  · Your cut from surgery or your drain area feels warm to the touch.  · You have pus or a bad smell coming from your cut from surgery or your drain.  · You have a fever.  · Your cut breaks open after your doctor removes your stitches, skin glue, or skin tape strips.  · Your new joint feels loose.  · You have knee pain that does not go away.  Get help right away if:  · You have a rash.  · You have pain in your calf or thigh.  · You have swelling in your calf or thigh.  · You have shortness of breath.  · You have trouble breathing.  · You have chest pain.  · Your ability to move your knee is getting worse.  This information is not intended to replace advice given to you by your health care provider. Make sure you discuss any questions you have with your health care provider.  Document Released: 03/11/2013 Document Revised: 02/05/2018 Document Reviewed: 11/23/2016  Estrogen Gene Test Interactive Patient Education © 2019 [employer]

## 2019-11-04 ENCOUNTER — TELEPHONE (OUTPATIENT)
Dept: ORTHOPEDIC SURGERY | Facility: CLINIC | Age: 59
End: 2019-11-04

## 2019-11-04 DIAGNOSIS — Z96.651 HX OF TOTAL KNEE REPLACEMENT, RIGHT: Primary | ICD-10-CM

## 2019-11-04 RX ORDER — HYDROCODONE BITARTRATE AND ACETAMINOPHEN 5; 325 MG/1; MG/1
1 TABLET ORAL EVERY 6 HOURS PRN
Qty: 40 TABLET | Refills: 0 | Status: SHIPPED | OUTPATIENT
Start: 2019-11-04 | End: 2019-11-15 | Stop reason: SDUPTHER

## 2019-11-14 ENCOUNTER — TELEPHONE (OUTPATIENT)
Dept: ORTHOPEDIC SURGERY | Facility: CLINIC | Age: 59
End: 2019-11-14

## 2019-11-15 ENCOUNTER — TELEPHONE (OUTPATIENT)
Dept: ORTHOPEDIC SURGERY | Facility: CLINIC | Age: 59
End: 2019-11-15

## 2019-11-15 DIAGNOSIS — Z96.651 HX OF TOTAL KNEE REPLACEMENT, RIGHT: ICD-10-CM

## 2019-11-15 RX ORDER — HYDROCODONE BITARTRATE AND ACETAMINOPHEN 5; 325 MG/1; MG/1
1 TABLET ORAL EVERY 12 HOURS PRN
Qty: 30 TABLET | Refills: 0 | Status: SHIPPED | OUTPATIENT
Start: 2019-11-15 | End: 2019-12-17

## 2019-11-15 NOTE — TELEPHONE ENCOUNTER
I have sent Norco 5/325MG electronically 1 by mouth every 12 hours prn pain #30 no refills to [facility] in [place].    Please let the patient know. Thanks NAN

## 2019-11-15 NOTE — TELEPHONE ENCOUNTER
I HAVE TRIED CALLING ALL OF THE NUMBERS LISTED ON FILE INCLUDING HER EMERGENCY CONTACT UNABLE TO INFORM THE PATIENT THAT DR. CHEW HAS ESCRIBED NORCO TO THE [facility] [place].

## 2019-12-04 ENCOUNTER — TELEPHONE (OUTPATIENT)
Dept: ORTHOPEDIC SURGERY | Facility: CLINIC | Age: 59
End: 2019-12-04

## 2019-12-04 NOTE — TELEPHONE ENCOUNTER
Patient asking for a refill of pain medications.  I read Dr. Almaraz's note that said that she should now be going back to her pain clinic.  Do you want me to tell the patient this?    I called patient to inform her that she would need to contact her pain management physician to refill narcotic    Spoke with patient at 15:51

## 2019-12-04 NOTE — TELEPHONE ENCOUNTER
PT CALLED ASKING FOR REFILL WOULD LIKE PERCOCET THEN LORTAB.  FUTURE APPT SCHEDULED 12/12/2019 PHARMACY [facility]

## 2019-12-17 ENCOUNTER — OFFICE VISIT (OUTPATIENT)
Dept: ORTHOPEDIC SURGERY | Facility: CLINIC | Age: 59
End: 2019-12-17

## 2019-12-17 ENCOUNTER — TELEPHONE (OUTPATIENT)
Dept: ORTHOPEDIC SURGERY | Facility: CLINIC | Age: 59
End: 2019-12-17

## 2019-12-17 VITALS
HEIGHT: 66 IN | DIASTOLIC BLOOD PRESSURE: 80 MMHG | WEIGHT: 165 LBS | BODY MASS INDEX: 26.52 KG/M2 | HEART RATE: 71 BPM | SYSTOLIC BLOOD PRESSURE: 152 MMHG

## 2019-12-17 DIAGNOSIS — E66.01 MORBID OBESITY (HCC): ICD-10-CM

## 2019-12-17 DIAGNOSIS — Z96.651 HX OF TOTAL KNEE REPLACEMENT, RIGHT: Primary | ICD-10-CM

## 2019-12-17 DIAGNOSIS — J44.1 COPD EXACERBATION (HCC): ICD-10-CM

## 2019-12-17 PROCEDURE — 99024 POSTOP FOLLOW-UP VISIT: CPT | Performed by: ORTHOPAEDIC SURGERY

## 2019-12-17 RX ORDER — LOSARTAN POTASSIUM 100 MG/1
100 TABLET ORAL DAILY
Refills: 1 | COMMUNITY
Start: 2019-11-05

## 2019-12-17 RX ORDER — HYDROCODONE BITARTRATE AND ACETAMINOPHEN 5; 325 MG/1; MG/1
1 TABLET ORAL EVERY 12 HOURS
Qty: 30 TABLET | Refills: 0 | Status: SHIPPED | OUTPATIENT
Start: 2019-12-17 | End: 2020-02-20

## 2019-12-17 RX ORDER — ALPRAZOLAM 2 MG/1
2 TABLET ORAL 3 TIMES DAILY PRN
Refills: 0 | COMMUNITY
Start: 2019-12-03

## 2019-12-17 RX ORDER — IBUPROFEN 800 MG/1
TABLET ORAL
Refills: 1 | COMMUNITY
Start: 2019-11-27

## 2019-12-17 NOTE — PROGRESS NOTES
POST OP TOTAL GLOBAL      NAME: Seda Urbina    : 1960    MRN: [id number]  ?  Right TKR     Date of Surgery: 10/16/19  ?  HPI:   Patient returns today for 8 week(s) follow up of right total knee arthroplasty. Incision(s) healed nicely with no signs of infection. Patient reports doing well with no unusual complaints. No fevers, rigors or chills. Appears to be progressing appropriately. Patient is on appropriate anticoagulation.  She has done extremely well postoperatively.  She is currently in a physical therapy program.  She is very pleased with her outcome.  She does have issues with chronic pain both from her low back as well as her left knee.  I have told her that there is no way that I can give her any more narcotic prescriptions after this particular prescription.  She has explained to me that she has multiple problems with her daughter at home and she is trying to deal with the social aspects of her personal life and therefore is finding it difficult to get connected to a pain clinic for long-term narcotic prescriptions.      Review of Systems:      Ortho Exam:   Right knee.The patient is status post total knee arthroplasty postoperative 8 week(s). Incision is clean. Calf is soft and nontender. Homans sign is negative. There is no clicking, popping or catching. Anterior and posterior drawer signs are negative.  There is no instability of the components. Appropriate amounts of swelling and bruising are noted. Dorsalis pedis and posterior tibial artery pulses are palpable. Common peroneal nerve function is well preserved. Range of motion is from 0-110 degrees of flexion. Gait is cautious but otherwise fairly normal. There is no evidence of a deep seated joint infection.      Diagnostic Studies:        Assessment:  Diagnoses and all orders for this visit:    Hx of total knee replacement, right  -     HYDROcodone-acetaminophen (NORCO) 5-325 MG per tablet; Take 1 tablet by mouth Every 12 (Twelve)  Hours.    Morbid obesity (CMS/[facility])    COPD exacerbation (CMS/[facility])            Plan   · Incision care  · To use ACE wrap/KAL stocking  · Continue ice to joint   · Stretching and strengthening exercises  · Aggressive ROM and follow the post total knee arthroplasty physical therapy protocols.  · Tablet Norco 5/325 mg tab 1 p.o. every 12 for pain swelling and discomfort beyond what cannot be relieved with anti-inflammatory medication.  Total 30 pills no refills.  There will be absolutely no refills for this patient after this particular prescription.  · Controlled substance treatment options discussed in detail. Patient's signed consent to medical options on file. MILES form in chart.  The patient is being provided this narcotic prescription to address the acute medical condition that they are undergoing/experiencing at this time.  It is my medical and surgical assessment that more than 3 days of narcotic medication is necessary to help control the pain and discomfort that this patient is experiencing at this point.  Risks of narcotic medication usage outlined.  Possibility of physical and psychological dependence and abuse, especially long term, emphasized to the patient.  I have explained to the patient that we will try to wean them off the narcotic medication as soon as possible and introduce non-narcotic modalities of pain control.  At this point in the patient's clinical spectrum, however, alternative available treatments are inadequate to control their pain and symptoms.  I have also discussed the possibility of random drug testing as well as pill counts to prevent misuse and misappropriation of the narcotic medications prescribed to the patient.  · Falls precautions  · You may now resume any prescription medications you were taking prior to surgery  · Continue with lifelong antibiotic prophylaxis with dental procedures following total joint replacement.  · Follow up in 6 month(s)    Meño Almaraz MD  12/17/2019

## 2019-12-17 NOTE — TELEPHONE ENCOUNTER
Patient again called to request refill of narcotic.  Dr. Almaraz instructed me to tell the patient to keep her post op appointments and he may be able to fill the medication.  Patient had appointment for today but canceled it.  Patient was informed to keep appointment.

## 2020-01-03 DIAGNOSIS — Z96.651 HX OF TOTAL KNEE REPLACEMENT, RIGHT: Primary | ICD-10-CM

## 2020-02-20 ENCOUNTER — OFFICE VISIT (OUTPATIENT)
Dept: PAIN MEDICINE | Facility: CLINIC | Age: 60
End: 2020-02-20

## 2020-02-20 ENCOUNTER — RESULTS ENCOUNTER (OUTPATIENT)
Dept: PAIN MEDICINE | Facility: CLINIC | Age: 60
End: 2020-02-20

## 2020-02-20 VITALS
BODY MASS INDEX: 26.52 KG/M2 | HEIGHT: 66 IN | HEART RATE: 76 BPM | SYSTOLIC BLOOD PRESSURE: 146 MMHG | RESPIRATION RATE: 16 BRPM | WEIGHT: 165 LBS | TEMPERATURE: 98.5 F | DIASTOLIC BLOOD PRESSURE: 88 MMHG | OXYGEN SATURATION: 99 %

## 2020-02-20 DIAGNOSIS — F19.90 CURRENT DRUG USE: Primary | ICD-10-CM

## 2020-02-20 DIAGNOSIS — M25.562 CHRONIC PAIN OF BOTH KNEES: ICD-10-CM

## 2020-02-20 DIAGNOSIS — G89.29 CHRONIC BILATERAL LOW BACK PAIN WITHOUT SCIATICA: ICD-10-CM

## 2020-02-20 DIAGNOSIS — G89.29 CHRONIC PAIN OF RIGHT ANKLE: ICD-10-CM

## 2020-02-20 DIAGNOSIS — M51.36 DDD (DEGENERATIVE DISC DISEASE), LUMBAR: ICD-10-CM

## 2020-02-20 DIAGNOSIS — M25.571 CHRONIC PAIN OF RIGHT ANKLE: ICD-10-CM

## 2020-02-20 DIAGNOSIS — G89.29 CHRONIC PAIN OF BOTH KNEES: ICD-10-CM

## 2020-02-20 DIAGNOSIS — M54.50 CHRONIC BILATERAL LOW BACK PAIN WITHOUT SCIATICA: ICD-10-CM

## 2020-02-20 DIAGNOSIS — F19.90 CURRENT DRUG USE: ICD-10-CM

## 2020-02-20 DIAGNOSIS — M25.561 CHRONIC PAIN OF BOTH KNEES: ICD-10-CM

## 2020-02-20 PROCEDURE — 99204 OFFICE O/P NEW MOD 45 MIN: CPT | Performed by: PHYSICAL MEDICINE & REHABILITATION

## 2020-02-20 PROCEDURE — G0463 HOSPITAL OUTPT CLINIC VISIT: HCPCS | Performed by: PHYSICAL MEDICINE & REHABILITATION

## 2020-02-20 RX ORDER — BUPROPION HYDROCHLORIDE 100 MG/1
100 TABLET ORAL DAILY
COMMUNITY
End: 2020-04-30 | Stop reason: SDUPTHER

## 2020-02-20 RX ORDER — HYDROCODONE BITARTRATE AND ACETAMINOPHEN 10; 325 MG/1; MG/1
1 TABLET ORAL EVERY 8 HOURS PRN
Qty: 90 TABLET | Refills: 0 | Status: SHIPPED | OUTPATIENT
Start: 2020-02-20 | End: 2020-02-20 | Stop reason: SDUPTHER

## 2020-02-20 RX ORDER — HYDROCODONE BITARTRATE AND ACETAMINOPHEN 10; 325 MG/1; MG/1
1 TABLET ORAL EVERY 6 HOURS PRN
Qty: 28 TABLET | Refills: 0 | Status: SHIPPED | OUTPATIENT
Start: 2020-02-20 | End: 2020-02-20 | Stop reason: SDUPTHER

## 2020-02-20 RX ORDER — HYDROCODONE BITARTRATE AND ACETAMINOPHEN 10; 325 MG/1; MG/1
1 TABLET ORAL EVERY 8 HOURS PRN
Qty: 90 TABLET | Refills: 0 | Status: SHIPPED | OUTPATIENT
Start: 2020-02-20 | End: 2020-04-30

## 2020-02-20 RX ORDER — HYDROXYZINE HYDROCHLORIDE 25 MG/1
25 TABLET, FILM COATED ORAL 2 TIMES DAILY
COMMUNITY

## 2020-02-20 RX ORDER — HYDROCODONE BITARTRATE AND ACETAMINOPHEN 10; 325 MG/1; MG/1
1 TABLET ORAL EVERY 8 HOURS PRN
Qty: 21 TABLET | Refills: 0 | Status: SHIPPED | OUTPATIENT
Start: 2020-02-20 | End: 2020-02-20 | Stop reason: SDUPTHER

## 2020-02-20 NOTE — PROGRESS NOTES
Subjective   Sedabailey Urbina is a 59 y.o. female.     LBP, b/l knee pain, right ankle pain, 10/10 at worst, 6/10 at best, always present, varies, began years ago, worsening, stabbing, burning, worse with all activity, interferes with aDLs, appetite, activity, failed surgery, PT, chiropractor, massage. X-ray b/l knees shows stable TKAs. Prior notes reviewed, as above, with referral for long-term opioid therapy which in the surgeon's opinion is necessary, s/p L TKA x 3, R TKA, failed Norco 5mg and Percocet 5mg, adequate relief with Norco 10mg TID prn. Has FH of substance abuse.       The following portions of the patient's history were reviewed and updated as appropriate: allergies, current medications, past family history, past medical history, past social history, past surgical history and problem list.    Review of Systems   Constitutional: Negative for chills, fatigue and fever.   HENT: Negative for hearing loss and trouble swallowing.    Eyes: Positive for visual disturbance.   Respiratory: Negative for shortness of breath.    Cardiovascular: Negative for chest pain.   Gastrointestinal: Positive for nausea. Negative for abdominal pain, constipation, diarrhea and vomiting.   Genitourinary: Negative for urinary incontinence.   Musculoskeletal: Positive for arthralgias, back pain and joint swelling. Negative for myalgias and neck pain.   Neurological: Positive for headache. Negative for dizziness, weakness and numbness.       Objective   Physical Exam   Constitutional: She is oriented to person, place, and time. She appears well-developed and well-nourished.   HENT:   Head: Normocephalic and atraumatic.   Eyes: Pupils are equal, round, and reactive to light. EOM are normal.   Neck: Normal range of motion.   Cardiovascular: Normal rate, regular rhythm, normal heart sounds and intact distal pulses.   Pulmonary/Chest: Breath sounds normal.   Abdominal: Soft. Bowel sounds are normal. She exhibits no distension. There  is no tenderness.   Musculoskeletal:   Signficant L TKA scar, typical R TKA scar   Neurological: She is alert and oriented to person, place, and time. She has normal strength and normal reflexes. She displays normal reflexes. No sensory deficit.   Psychiatric: She has a normal mood and affect. Her behavior is normal. Thought content normal.         Assessment/Plan   Seda was seen today for back pain and pain.    Diagnoses and all orders for this visit:    Chronic bilateral low back pain without sciatica  -     Discontinue: HYDROcodone-acetaminophen (NORCO)  MG per tablet; Take 1 tablet by mouth Every 6 (Six) Hours As Needed for Moderate Pain .  -     Discontinue: HYDROcodone-acetaminophen (NORCO)  MG per tablet; Take 1 tablet by mouth Every 8 (Eight) Hours As Needed for Moderate Pain .  -     Discontinue: HYDROcodone-acetaminophen (NORCO)  MG per tablet; Take 1 tablet by mouth Every 8 (Eight) Hours As Needed for Moderate Pain .  -     Discontinue: HYDROcodone-acetaminophen (NORCO)  MG per tablet; Take 1 tablet by mouth Every 8 (Eight) Hours As Needed for Moderate Pain .  -     HYDROcodone-acetaminophen (NORCO)  MG per tablet; Take 1 tablet by mouth Every 8 (Eight) Hours As Needed for Moderate Pain .    DDD (degenerative disc disease), lumbar    Chronic pain of both knees    Chronic pain of right ankle        Discussed risks and benefits of opioid treatment for chonic pain with patient, including expectations related to prescription requests, alternative modalities to opioids for managing pain, her treatment plan, risks of dependency and addiction, and safe storage practices for prescribed opioids, as well as proper and improper disposal of all medications.  Will obtain UDS today, pain contract today.  Inspect report reviewed, prior notes reviewed, consistent with patient's stated history.  Treatment plan will consist of continuing current medication as long as it remains effective and  is necessary, while evaluating patient at each visit and determining if the medication can be lowered or discontinued, while also using nonopioid therapies to reduce reliance on opioids.  Begin Norco 10mg TID prn.  May consider LESIs in future.  RTC 2-3 months for f/u.    ADD: UDS (+) for diazepam metabolites, may have been given for a procedure?, will d/w patient next visit.

## 2020-03-23 ENCOUNTER — TELEPHONE (OUTPATIENT)
Dept: PAIN MEDICINE | Facility: CLINIC | Age: 60
End: 2020-03-23

## 2020-03-23 NOTE — TELEPHONE ENCOUNTER
Patient has an appt tomorrow but has a cough and running low grade fever patient. She didn't sound like she needed to be out and about can you e- scribe her something?

## 2020-03-24 ENCOUNTER — APPOINTMENT (OUTPATIENT)
Dept: PAIN MEDICINE | Facility: CLINIC | Age: 60
End: 2020-03-24

## 2020-04-16 ENCOUNTER — APPOINTMENT (OUTPATIENT)
Dept: PAIN MEDICINE | Facility: CLINIC | Age: 60
End: 2020-04-16

## 2020-04-27 ENCOUNTER — TELEPHONE (OUTPATIENT)
Dept: PAIN MEDICINE | Facility: CLINIC | Age: 60
End: 2020-04-27

## 2020-04-27 RX ORDER — OXYCODONE AND ACETAMINOPHEN 10; 325 MG/1; MG/1
1 TABLET ORAL EVERY 8 HOURS PRN
Qty: 90 TABLET | Refills: 0 | Status: SHIPPED | OUTPATIENT
Start: 2020-04-27 | End: 2020-04-30 | Stop reason: SDUPTHER

## 2020-04-27 NOTE — TELEPHONE ENCOUNTER
Inspect reviewed, Percocet 10mg TID prn sent to [facility] in [place]. If you speak with her, please let her know how sorry I am to hear about her sister.

## 2020-04-27 NOTE — TELEPHONE ENCOUNTER
Pt called today and said her sister passed away unexpectedly, so she is stuck in [place].  [facility] it  6 days ago I assume bc scripts are only good for 60 days.  She is wanting to know if we can send in her hydrocodone electronically.  She also wants to know if you will send something stronger in bc she fell and cracker her ribs. I will put the [facility] pharmacy in.  Inspect scanned in

## 2020-04-30 ENCOUNTER — OFFICE VISIT (OUTPATIENT)
Dept: PAIN MEDICINE | Facility: CLINIC | Age: 60
End: 2020-04-30

## 2020-04-30 VITALS — BODY MASS INDEX: 38.57 KG/M2 | WEIGHT: 240 LBS | HEIGHT: 66 IN

## 2020-04-30 DIAGNOSIS — M25.561 CHRONIC PAIN OF BOTH KNEES: ICD-10-CM

## 2020-04-30 DIAGNOSIS — M25.562 CHRONIC PAIN OF LEFT KNEE: ICD-10-CM

## 2020-04-30 DIAGNOSIS — M79.672 PAIN OF LEFT HEEL: ICD-10-CM

## 2020-04-30 DIAGNOSIS — G89.29 CHRONIC PAIN OF LEFT KNEE: ICD-10-CM

## 2020-04-30 DIAGNOSIS — M25.562 CHRONIC PAIN OF BOTH KNEES: ICD-10-CM

## 2020-04-30 DIAGNOSIS — G89.29 CHRONIC PAIN OF RIGHT ANKLE: Primary | ICD-10-CM

## 2020-04-30 DIAGNOSIS — M54.50 CHRONIC BILATERAL LOW BACK PAIN WITHOUT SCIATICA: ICD-10-CM

## 2020-04-30 DIAGNOSIS — M25.562 ARTHRALGIA OF KNEE, LEFT: ICD-10-CM

## 2020-04-30 DIAGNOSIS — M51.36 DDD (DEGENERATIVE DISC DISEASE), LUMBAR: ICD-10-CM

## 2020-04-30 DIAGNOSIS — G89.29 CHRONIC PAIN OF BOTH KNEES: ICD-10-CM

## 2020-04-30 DIAGNOSIS — M48.061 SPINAL STENOSIS OF LUMBAR REGION, UNSPECIFIED WHETHER NEUROGENIC CLAUDICATION PRESENT: ICD-10-CM

## 2020-04-30 DIAGNOSIS — M47.816 LUMBAR SPONDYLOSIS: ICD-10-CM

## 2020-04-30 DIAGNOSIS — G89.29 CHRONIC BILATERAL LOW BACK PAIN WITHOUT SCIATICA: ICD-10-CM

## 2020-04-30 DIAGNOSIS — M25.571 CHRONIC PAIN OF RIGHT ANKLE: Primary | ICD-10-CM

## 2020-04-30 PROCEDURE — 99441 PR PHYS/QHP TELEPHONE EVALUATION 5-10 MIN: CPT | Performed by: PHYSICAL MEDICINE & REHABILITATION

## 2020-04-30 RX ORDER — HYDROXYZINE PAMOATE 25 MG/1
CAPSULE ORAL
COMMUNITY
Start: 2020-04-22 | End: 2020-04-30 | Stop reason: SDUPTHER

## 2020-04-30 RX ORDER — ESTRADIOL 2 MG/1
2 TABLET ORAL DAILY
COMMUNITY
Start: 2020-02-21

## 2020-04-30 RX ORDER — BUPROPION HYDROCHLORIDE 150 MG/1
150 TABLET ORAL DAILY
COMMUNITY
Start: 2020-04-14

## 2020-04-30 RX ORDER — OXYCODONE AND ACETAMINOPHEN 10; 325 MG/1; MG/1
1 TABLET ORAL EVERY 8 HOURS PRN
Qty: 90 TABLET | Refills: 0 | Status: SHIPPED | OUTPATIENT
Start: 2020-04-30 | End: 2020-06-23 | Stop reason: SDUPTHER

## 2020-04-30 NOTE — PROGRESS NOTES
Subjective   Sedabailey Urbina is a 60 y.o. female.     LBP, b/l knee pain, right ankle pain, 10/10 at worst, 6/10 at best, always present, varies, began years ago, worsening, stabbing, burning, worse with all activity, interferes with ADLs, appetite, activity, failed surgery, PT, chiropractor, massage. X-ray b/l knees shows stable TKAs. Prior notes reviewed, as above, with referral for long-term opioid therapy which in the surgeon's opinion is necessary, s/p L TKA x 3, R TKA, failed Norco 5mg and Percocet 5mg, adequate relief with Norco 10mg TID prn. Has FH of substance abuse.       The following portions of the patient's history were reviewed and updated as appropriate: allergies, current medications, past family history, past medical history, past social history, past surgical history and problem list.    Review of Systems   Constitutional: Negative for chills, fatigue and fever.   HENT: Negative for hearing loss and trouble swallowing.    Eyes: Positive for visual disturbance.   Respiratory: Negative for shortness of breath.    Cardiovascular: Negative for chest pain.   Gastrointestinal: Positive for nausea. Negative for abdominal pain, constipation, diarrhea and vomiting.   Genitourinary: Negative for urinary incontinence.   Musculoskeletal: Positive for arthralgias, back pain and joint swelling. Negative for myalgias and neck pain.   Neurological: Positive for headache. Negative for dizziness, weakness and numbness.       Objective   Physical Exam   Constitutional: She is oriented to person, place, and time.   Musculoskeletal:   Signficant L TKA scar, typical R TKA scar   Neurological: She is alert and oriented to person, place, and time. She has normal strength and normal reflexes.   Psychiatric: She has a normal mood and affect. Her behavior is normal. Thought content normal.         Assessment/Plan   Seda was seen today for back pain.    Diagnoses and all orders for this visit:    Chronic pain of right  ankle    Chronic bilateral low back pain without sciatica    Pain of left heel    Arthralgia of knee, left    Chronic pain of left knee    DDD (degenerative disc disease), lumbar    Chronic pain of both knees    Lumbar spondylosis    Spinal stenosis of lumbar region, unspecified whether neurogenic claudication present        Inspect reviewed, in order, filled 4/27/20. UDS in order 2/20/20.  Treatment plan will consist of continuing current medication as long as it remains effective and is necessary, while evaluating patient at each visit and determining if the medication can be lowered or discontinued, while also using nonopioid therapies to reduce reliance on opioids.  Restarted Norco 10mg TID prn with poor relief, rotated to Percocet 10mg TID prn with good relief.  May consider LESIs in future.  RTC 2 months for f/u. Telephone visit, spent 7 minutes discussing her medications and plan of care.

## 2020-06-22 ENCOUNTER — TELEPHONE (OUTPATIENT)
Dept: PAIN MEDICINE | Facility: HOSPITAL | Age: 60
End: 2020-06-22

## 2020-06-22 RX ORDER — PROMETHAZINE HYDROCHLORIDE 25 MG/1
25 TABLET ORAL EVERY 6 HOURS PRN
Qty: 120 TABLET | Refills: 2 | Status: SHIPPED | OUTPATIENT
Start: 2020-06-22

## 2020-06-22 NOTE — TELEPHONE ENCOUNTER
Pt wants to know if you can order Phenergan for her she does not have a PCP and cant hold down her pain medication.

## 2020-06-23 ENCOUNTER — OFFICE VISIT (OUTPATIENT)
Dept: PAIN MEDICINE | Facility: CLINIC | Age: 60
End: 2020-06-23

## 2020-06-23 VITALS — HEIGHT: 66 IN | BODY MASS INDEX: 24.91 KG/M2 | WEIGHT: 155 LBS

## 2020-06-23 DIAGNOSIS — M25.562 ARTHRALGIA OF KNEE, LEFT: ICD-10-CM

## 2020-06-23 DIAGNOSIS — M25.561 CHRONIC PAIN OF BOTH KNEES: ICD-10-CM

## 2020-06-23 DIAGNOSIS — M25.562 CHRONIC PAIN OF BOTH KNEES: ICD-10-CM

## 2020-06-23 DIAGNOSIS — M25.562 CHRONIC PAIN OF LEFT KNEE: ICD-10-CM

## 2020-06-23 DIAGNOSIS — M47.816 LUMBAR SPONDYLOSIS: ICD-10-CM

## 2020-06-23 DIAGNOSIS — G89.29 CHRONIC BILATERAL LOW BACK PAIN WITHOUT SCIATICA: Primary | ICD-10-CM

## 2020-06-23 DIAGNOSIS — M51.36 DDD (DEGENERATIVE DISC DISEASE), LUMBAR: ICD-10-CM

## 2020-06-23 DIAGNOSIS — M48.061 SPINAL STENOSIS OF LUMBAR REGION, UNSPECIFIED WHETHER NEUROGENIC CLAUDICATION PRESENT: ICD-10-CM

## 2020-06-23 DIAGNOSIS — G89.29 CHRONIC PAIN OF LEFT KNEE: ICD-10-CM

## 2020-06-23 DIAGNOSIS — G89.29 CHRONIC PAIN OF RIGHT ANKLE: ICD-10-CM

## 2020-06-23 DIAGNOSIS — M25.571 CHRONIC PAIN OF RIGHT ANKLE: ICD-10-CM

## 2020-06-23 DIAGNOSIS — M25.561 ACUTE PAIN OF RIGHT KNEE: ICD-10-CM

## 2020-06-23 DIAGNOSIS — M54.50 CHRONIC BILATERAL LOW BACK PAIN WITHOUT SCIATICA: Primary | ICD-10-CM

## 2020-06-23 DIAGNOSIS — IMO0002 OSTEOARTHROSIS INVOLVING LOWER LEG: ICD-10-CM

## 2020-06-23 DIAGNOSIS — G89.29 CHRONIC PAIN OF BOTH KNEES: ICD-10-CM

## 2020-06-23 PROCEDURE — 99441 PR PHYS/QHP TELEPHONE EVALUATION 5-10 MIN: CPT | Performed by: PHYSICAL MEDICINE & REHABILITATION

## 2020-06-23 RX ORDER — GABAPENTIN 800 MG/1
800 TABLET ORAL 3 TIMES DAILY
Qty: 90 TABLET | Refills: 2 | Status: SHIPPED | OUTPATIENT
Start: 2020-06-23 | End: 2020-09-24

## 2020-06-23 RX ORDER — OXYCODONE AND ACETAMINOPHEN 10; 325 MG/1; MG/1
1 TABLET ORAL EVERY 8 HOURS PRN
Qty: 90 TABLET | Refills: 0 | Status: SHIPPED | OUTPATIENT
Start: 2020-06-23 | End: 2020-08-14 | Stop reason: SDUPTHER

## 2020-06-23 NOTE — PROGRESS NOTES
Subjective   Seda EMELINA Urbina is a 60 y.o. female.     LBP, b/l knee pain, right ankle pain, 10/10 at worst, 6/10 at best, always present, varies, began years ago, worsening, stabbing, burning, worse with all activity, interferes with ADLs, appetite, activity, failed surgery, PT, chiropractor, massage. X-ray b/l knees shows stable TKAs. Prior notes reviewed, as above, with referral for long-term opioid therapy which in the surgeon's opinion is necessary, s/p L TKA x 3, R TKA, failed Norco 5mg and Percocet 5mg, adequate relief with Norco 10mg TID prn. Has FH of substance abuse.       The following portions of the patient's history were reviewed and updated as appropriate: allergies, current medications, past family history, past medical history, past social history, past surgical history and problem list.    Review of Systems   Constitutional: Negative for chills, fatigue and fever.   HENT: Negative for hearing loss and trouble swallowing.    Eyes: Positive for visual disturbance.   Respiratory: Negative for shortness of breath.    Cardiovascular: Negative for chest pain.   Gastrointestinal: Positive for nausea. Negative for abdominal pain, constipation, diarrhea and vomiting.   Genitourinary: Negative for urinary incontinence.   Musculoskeletal: Positive for arthralgias, back pain and joint swelling. Negative for myalgias and neck pain.   Neurological: Positive for headache. Negative for dizziness, weakness and numbness.       Objective   Physical Exam   Constitutional: She is oriented to person, place, and time.   Musculoskeletal:   Signficant L TKA scar, typical R TKA scar   Neurological: She is alert and oriented to person, place, and time. She has normal strength and normal reflexes.   Psychiatric: She has a normal mood and affect. Her behavior is normal. Thought content normal.         Assessment/Plan   Seda was seen today for pain.    Diagnoses and all orders for this visit:    Chronic bilateral low back  pain without sciatica    Acute pain of right knee    Arthralgia of knee, left    Chronic pain of left knee    DDD (degenerative disc disease), lumbar    Chronic pain of both knees    Lumbar spondylosis    Osteoarthrosis involving lower leg    Spinal stenosis of lumbar region, unspecified whether neurogenic claudication present    Chronic pain of right ankle        Inspect reviewed, in order, filled 4/27/20. UDS in order 2/20/20.  Treatment plan will consist of continuing current medication as long as it remains effective and is necessary, while evaluating patient at each visit and determining if the medication can be lowered or discontinued, while also using nonopioid therapies to reduce reliance on opioids.  Restarted Norco 10mg TID prn with poor relief, rotated to Percocet 10mg TID prn with good relief.  May consider LESIs in future.  RTC 2 months for f/u. Telephone visit, spent 6 minutes discussing her medications and plan of care.

## 2020-06-25 ENCOUNTER — APPOINTMENT (OUTPATIENT)
Dept: PAIN MEDICINE | Facility: CLINIC | Age: 60
End: 2020-06-25

## 2020-08-14 ENCOUNTER — OFFICE VISIT (OUTPATIENT)
Dept: PAIN MEDICINE | Facility: CLINIC | Age: 60
End: 2020-08-14

## 2020-08-14 VITALS — BODY MASS INDEX: 25.71 KG/M2 | WEIGHT: 160 LBS | HEIGHT: 66 IN | RESPIRATION RATE: 16 BRPM

## 2020-08-14 DIAGNOSIS — G89.29 CHRONIC PAIN OF BOTH KNEES: ICD-10-CM

## 2020-08-14 DIAGNOSIS — M25.561 CHRONIC PAIN OF BOTH KNEES: ICD-10-CM

## 2020-08-14 DIAGNOSIS — M25.562 ARTHRALGIA OF KNEE, LEFT: ICD-10-CM

## 2020-08-14 DIAGNOSIS — M54.50 CHRONIC BILATERAL LOW BACK PAIN WITHOUT SCIATICA: Primary | ICD-10-CM

## 2020-08-14 DIAGNOSIS — M25.571 CHRONIC PAIN OF RIGHT ANKLE: ICD-10-CM

## 2020-08-14 DIAGNOSIS — M25.562 CHRONIC PAIN OF LEFT KNEE: ICD-10-CM

## 2020-08-14 DIAGNOSIS — M51.36 DDD (DEGENERATIVE DISC DISEASE), LUMBAR: ICD-10-CM

## 2020-08-14 DIAGNOSIS — G89.29 CHRONIC PAIN OF RIGHT ANKLE: ICD-10-CM

## 2020-08-14 DIAGNOSIS — M25.562 CHRONIC PAIN OF BOTH KNEES: ICD-10-CM

## 2020-08-14 DIAGNOSIS — G89.29 CHRONIC PAIN OF LEFT KNEE: ICD-10-CM

## 2020-08-14 DIAGNOSIS — G89.29 CHRONIC BILATERAL LOW BACK PAIN WITHOUT SCIATICA: Primary | ICD-10-CM

## 2020-08-14 DIAGNOSIS — M47.816 LUMBAR SPONDYLOSIS: ICD-10-CM

## 2020-08-14 PROCEDURE — 99441 PR PHYS/QHP TELEPHONE EVALUATION 5-10 MIN: CPT | Performed by: PHYSICAL MEDICINE & REHABILITATION

## 2020-08-14 RX ORDER — OXYCODONE AND ACETAMINOPHEN 10; 325 MG/1; MG/1
1 TABLET ORAL EVERY 8 HOURS PRN
Qty: 90 TABLET | Refills: 0 | Status: SHIPPED | OUTPATIENT
Start: 2020-08-14 | End: 2020-09-21 | Stop reason: SDUPTHER

## 2020-08-14 RX ORDER — OXYCODONE AND ACETAMINOPHEN 10; 325 MG/1; MG/1
1 TABLET ORAL EVERY 8 HOURS PRN
Qty: 90 TABLET | Refills: 0 | Status: SHIPPED | OUTPATIENT
Start: 2020-08-14 | End: 2020-10-16 | Stop reason: SDUPTHER

## 2020-08-14 NOTE — PROGRESS NOTES
Subjective   Sedabailey Urbina is a 60 y.o. female.     LBP, b/l knee pain, right ankle pain, 10/10 at worst, 6/10 at best, always present, varies, began years ago, worsening, stabbing, burning, worse with all activity, interferes with ADLs, appetite, activity, failed surgery, PT, chiropractor, massage. X-ray b/l knees shows stable TKAs. Prior notes reviewed, as above, with referral for long-term opioid therapy which in the surgeon's opinion is necessary, s/p L TKA x 3, R TKA, failed Norco 5mg and Percocet 5mg, adequate relief with Norco 10mg TID prn. Has FH of substance abuse.       The following portions of the patient's history were reviewed and updated as appropriate: allergies, current medications, past family history, past medical history, past social history, past surgical history and problem list.    Review of Systems   Constitutional: Negative for chills, fatigue and fever.   HENT: Negative for hearing loss and trouble swallowing.    Eyes: Positive for visual disturbance.   Respiratory: Negative for shortness of breath.    Cardiovascular: Negative for chest pain.   Gastrointestinal: Positive for nausea. Negative for abdominal pain, constipation, diarrhea and vomiting.   Genitourinary: Negative for urinary incontinence.   Musculoskeletal: Positive for arthralgias, back pain and joint swelling. Negative for myalgias and neck pain.   Neurological: Positive for headache. Negative for dizziness, weakness and numbness.       Objective   Physical Exam   Constitutional: She is oriented to person, place, and time.   Musculoskeletal:   Signficant L TKA scar, typical R TKA scar   Neurological: She is alert and oriented to person, place, and time. She has normal strength and normal reflexes.   Psychiatric: She has a normal mood and affect. Her behavior is normal. Thought content normal.         Assessment/Plan   Seda was seen today for back pain and leg pain.    Diagnoses and all orders for this visit:    Chronic  bilateral low back pain without sciatica    Chronic pain of right ankle    Arthralgia of knee, left    Chronic pain of left knee    DDD (degenerative disc disease), lumbar    Chronic pain of both knees    Lumbar spondylosis        Inspect reviewed, in order, filled 7/23/20. UDS in order 2/20/20.  Treatment plan will consist of continuing current medication as long as it remains effective and is necessary, while evaluating patient at each visit and determining if the medication can be lowered or discontinued, while also using nonopioid therapies to reduce reliance on opioids.  Restarted Norco 10mg TID prn with poor relief, rotated to Percocet 10mg TID prn with good relief.  May consider LESIs in future.  RTC 2 months for f/u. Telephone visit, spent 5 minutes discussing her medications and plan of care.

## 2020-08-17 ENCOUNTER — TELEPHONE (OUTPATIENT)
Dept: PAIN MEDICINE | Facility: HOSPITAL | Age: 60
End: 2020-08-17

## 2020-09-21 DIAGNOSIS — M54.50 CHRONIC BILATERAL LOW BACK PAIN WITHOUT SCIATICA: ICD-10-CM

## 2020-09-21 DIAGNOSIS — G89.29 CHRONIC BILATERAL LOW BACK PAIN WITHOUT SCIATICA: ICD-10-CM

## 2020-09-21 RX ORDER — OXYCODONE AND ACETAMINOPHEN 10; 325 MG/1; MG/1
1 TABLET ORAL EVERY 8 HOURS PRN
Qty: 90 TABLET | Refills: 0 | Status: SHIPPED | OUTPATIENT
Start: 2020-09-21 | End: 2020-11-12 | Stop reason: SDUPTHER

## 2020-09-21 NOTE — TELEPHONE ENCOUNTER
Her pharmacy is out of Oxycodone, can you resend to [facility]? I added the pharmacy and scanned Inspect in

## 2020-09-23 DIAGNOSIS — M48.061 SPINAL STENOSIS OF LUMBAR REGION, UNSPECIFIED WHETHER NEUROGENIC CLAUDICATION PRESENT: ICD-10-CM

## 2020-09-24 RX ORDER — GABAPENTIN 800 MG/1
TABLET ORAL
Qty: 90 TABLET | Refills: 2 | Status: SHIPPED | OUTPATIENT
Start: 2020-09-24 | End: 2020-10-16 | Stop reason: SDUPTHER

## 2020-10-16 ENCOUNTER — TELEPHONE (OUTPATIENT)
Dept: ORTHOPEDIC SURGERY | Facility: CLINIC | Age: 60
End: 2020-10-16

## 2020-10-16 DIAGNOSIS — G89.29 CHRONIC BILATERAL LOW BACK PAIN WITHOUT SCIATICA: ICD-10-CM

## 2020-10-16 DIAGNOSIS — M48.061 SPINAL STENOSIS OF LUMBAR REGION, UNSPECIFIED WHETHER NEUROGENIC CLAUDICATION PRESENT: ICD-10-CM

## 2020-10-16 DIAGNOSIS — M54.50 CHRONIC BILATERAL LOW BACK PAIN WITHOUT SCIATICA: ICD-10-CM

## 2020-10-16 NOTE — TELEPHONE ENCOUNTER
PATIENT ALANA HERRING  [phone]    PATIENT SPOKE TO  TO GET A REFERRAL FOR HOME HEALTH. PATIENT WAS TOLD THAT  WAS DIRECTING DR. ESPINO TO SEND OVER A REFERRAL FOR HOME HEALTH FOR THE PATIENT BUT THERE IS NO ORDER. CAN YOU PLEASE CREATE A REFERRAL FOR HOME HEALTH OR CONTACT PATIENT BACK.    THANK YOU

## 2020-10-16 NOTE — TELEPHONE ENCOUNTER
Patient missed her last appt. She has been sick and been hospitalized. She already has an appt. Schedule with you but needs Oxycodone and gabapentin before her appt.

## 2020-10-19 RX ORDER — OXYCODONE AND ACETAMINOPHEN 10; 325 MG/1; MG/1
1 TABLET ORAL EVERY 8 HOURS PRN
Qty: 90 TABLET | Refills: 0 | Status: SHIPPED | OUTPATIENT
Start: 2020-10-19 | End: 2020-11-12 | Stop reason: SDUPTHER

## 2020-10-19 RX ORDER — GABAPENTIN 800 MG/1
800 TABLET ORAL 3 TIMES DAILY
Qty: 90 TABLET | Refills: 2 | Status: SHIPPED | OUTPATIENT
Start: 2020-10-19 | End: 2021-01-18

## 2020-10-20 NOTE — TELEPHONE ENCOUNTER
Call placed to patient, advised as per Catherine and that we would need to see her in office. States she had gotten a call from home health about setting it up and it was from Catherine. Advised the last order we had sent for her was from a year ago. Patient states she will talk to Dr. Smith.

## 2020-11-10 ENCOUNTER — TELEPHONE (OUTPATIENT)
Dept: PAIN MEDICINE | Facility: HOSPITAL | Age: 60
End: 2020-11-10

## 2020-11-12 ENCOUNTER — OFFICE VISIT (OUTPATIENT)
Dept: PAIN MEDICINE | Facility: CLINIC | Age: 60
End: 2020-11-12

## 2020-11-12 VITALS — BODY MASS INDEX: 26.52 KG/M2 | WEIGHT: 165 LBS | HEIGHT: 66 IN

## 2020-11-12 DIAGNOSIS — M25.571 CHRONIC PAIN OF RIGHT ANKLE: ICD-10-CM

## 2020-11-12 DIAGNOSIS — G89.29 CHRONIC BILATERAL LOW BACK PAIN WITHOUT SCIATICA: Primary | ICD-10-CM

## 2020-11-12 DIAGNOSIS — M25.562 CHRONIC PAIN OF BOTH KNEES: ICD-10-CM

## 2020-11-12 DIAGNOSIS — M25.561 CHRONIC PAIN OF BOTH KNEES: ICD-10-CM

## 2020-11-12 DIAGNOSIS — M47.816 LUMBAR SPONDYLOSIS: ICD-10-CM

## 2020-11-12 DIAGNOSIS — M48.061 SPINAL STENOSIS OF LUMBAR REGION, UNSPECIFIED WHETHER NEUROGENIC CLAUDICATION PRESENT: ICD-10-CM

## 2020-11-12 DIAGNOSIS — M51.36 DDD (DEGENERATIVE DISC DISEASE), LUMBAR: ICD-10-CM

## 2020-11-12 DIAGNOSIS — M54.50 CHRONIC BILATERAL LOW BACK PAIN WITHOUT SCIATICA: Primary | ICD-10-CM

## 2020-11-12 DIAGNOSIS — G89.29 CHRONIC PAIN OF RIGHT ANKLE: ICD-10-CM

## 2020-11-12 DIAGNOSIS — G89.29 CHRONIC PAIN OF BOTH KNEES: ICD-10-CM

## 2020-11-12 DIAGNOSIS — M25.562 CHRONIC PAIN OF LEFT KNEE: ICD-10-CM

## 2020-11-12 DIAGNOSIS — G89.29 CHRONIC PAIN OF LEFT KNEE: ICD-10-CM

## 2020-11-12 DIAGNOSIS — M25.562 ARTHRALGIA OF KNEE, LEFT: ICD-10-CM

## 2020-11-12 PROCEDURE — 99441 PR PHYS/QHP TELEPHONE EVALUATION 5-10 MIN: CPT | Performed by: PHYSICAL MEDICINE & REHABILITATION

## 2020-11-12 RX ORDER — OXYCODONE AND ACETAMINOPHEN 10; 325 MG/1; MG/1
1 TABLET ORAL EVERY 8 HOURS PRN
Qty: 90 TABLET | Refills: 0 | Status: SHIPPED | OUTPATIENT
Start: 2020-11-12 | End: 2021-01-14 | Stop reason: SDUPTHER

## 2020-11-12 RX ORDER — OXYCODONE AND ACETAMINOPHEN 10; 325 MG/1; MG/1
1 TABLET ORAL EVERY 8 HOURS PRN
Qty: 90 TABLET | Refills: 0 | Status: SHIPPED | OUTPATIENT
Start: 2020-11-12 | End: 2020-12-12

## 2020-11-12 RX ORDER — LIDOCAINE 50 MG/G
1 PATCH TOPICAL DAILY PRN
Qty: 90 PATCH | Refills: 2 | Status: SHIPPED | OUTPATIENT
Start: 2020-11-12

## 2020-11-12 RX ORDER — DULOXETIN HYDROCHLORIDE 60 MG/1
CAPSULE, DELAYED RELEASE ORAL
COMMUNITY
Start: 2020-11-06

## 2020-11-12 NOTE — PROGRESS NOTES
Subjective   Sedabailey Urbina is a 60 y.o. female.     LBP, b/l knee pain, right ankle pain, 10/10 at worst, 6/10 at best, always present, varies, began years ago, worsening, stabbing, burning, worse with all activity, interferes with ADLs, appetite, activity, failed surgery, PT, chiropractor, massage. X-ray b/l knees shows stable TKAs. Prior notes reviewed, as above, with referral for long-term opioid therapy which in the surgeon's opinion is necessary, s/p L TKA x 3, R TKA, failed Norco 5mg and Percocet 5mg, adequate relief with Norco 10mg TID prn. Has FH of substance abuse.       The following portions of the patient's history were reviewed and updated as appropriate: allergies, current medications, past family history, past medical history, past social history, past surgical history and problem list.    Review of Systems   Constitutional: Negative for chills, fatigue and fever.   HENT: Negative for hearing loss and trouble swallowing.    Eyes: Positive for visual disturbance.   Respiratory: Negative for shortness of breath.    Cardiovascular: Negative for chest pain.   Gastrointestinal: Positive for nausea. Negative for abdominal pain, constipation, diarrhea and vomiting.   Genitourinary: Negative for urinary incontinence.   Musculoskeletal: Positive for arthralgias, back pain and joint swelling. Negative for myalgias and neck pain.   Neurological: Positive for headache. Negative for dizziness, weakness and numbness.       Objective   Physical Exam   Constitutional: She is oriented to person, place, and time.   Musculoskeletal:      Comments: Signficant L TKA scar, typical R TKA scar   Neurological: She is alert and oriented to person, place, and time. She has normal reflexes.   Psychiatric: Her behavior is normal. Thought content normal.         Assessment/Plan   Diagnoses and all orders for this visit:    1. Chronic bilateral low back pain without sciatica (Primary)    2. Chronic pain of right ankle    3.  Arthralgia of knee, left    4. Chronic pain of left knee    5. DDD (degenerative disc disease), lumbar    6. Chronic pain of both knees    7. Lumbar spondylosis    8. Spinal stenosis of lumbar region, unspecified whether neurogenic claudication present        Inspect reviewed, in order, filled 7/23/20. UDS in order 2/20/20.  Treatment plan will consist of continuing current medication as long as it remains effective and is necessary, while evaluating patient at each visit and determining if the medication can be lowered or discontinued, while also using nonopioid therapies to reduce reliance on opioids.  Restarted Norco 10mg TID prn with poor relief, rotated to Percocet 10mg TID prn with good relief.  May consider LESIs in future.  Begin Lidoderm.  RTC 2 months for f/u. Telephone visit, spent 5 minutes discussing her medications and plan of care.

## 2020-12-14 ENCOUNTER — TELEPHONE (OUTPATIENT)
Dept: PAIN MEDICINE | Facility: HOSPITAL | Age: 60
End: 2020-12-14

## 2020-12-14 NOTE — TELEPHONE ENCOUNTER
Her prescription has a DNF of 12/18 she says it should be 12/16, could you take a look? Inspect in file.

## 2020-12-14 NOTE — TELEPHONE ENCOUNTER
Actually it should be 12/20 since she filled on 10/21, I moved it up since it's a weekend, 12/18 is correct per Inspect, thanks.

## 2021-01-11 ENCOUNTER — TELEPHONE (OUTPATIENT)
Dept: ORTHOPEDIC SURGERY | Facility: CLINIC | Age: 61
End: 2021-01-11

## 2021-01-11 NOTE — TELEPHONE ENCOUNTER
Provider:    Caller: PATIENT    Relationship to Patient: SELF    Phone Number: [phone]    Reason for Call: PT. HAS APPT. ON 01/14/21 WITH DR. SANTANA. PT. STATES THAT SHE IS NOT ABLE TO COME IN DUE TO NEEDING BRAKES AND NOT HAVING THE MONEY TO GET THERE.   SHE IS ASKING IF THIS CAN BE CHANGED A PHONE VISIT.  PT. IS CRYING AND SAID THAT SHE HAS A LOT GOING ON.  PLEASE CALL TO ADVISE.

## 2021-01-14 ENCOUNTER — OFFICE VISIT (OUTPATIENT)
Dept: PAIN MEDICINE | Facility: CLINIC | Age: 61
End: 2021-01-14

## 2021-01-14 VITALS — HEIGHT: 67 IN | WEIGHT: 160 LBS | BODY MASS INDEX: 25.11 KG/M2

## 2021-01-14 DIAGNOSIS — M54.50 CHRONIC BILATERAL LOW BACK PAIN WITHOUT SCIATICA: Primary | ICD-10-CM

## 2021-01-14 DIAGNOSIS — M51.36 DDD (DEGENERATIVE DISC DISEASE), LUMBAR: ICD-10-CM

## 2021-01-14 DIAGNOSIS — M25.562 CHRONIC PAIN OF LEFT KNEE: ICD-10-CM

## 2021-01-14 DIAGNOSIS — M47.816 LUMBAR SPONDYLOSIS: ICD-10-CM

## 2021-01-14 DIAGNOSIS — G89.29 CHRONIC PAIN OF RIGHT ANKLE: ICD-10-CM

## 2021-01-14 DIAGNOSIS — M48.061 SPINAL STENOSIS OF LUMBAR REGION, UNSPECIFIED WHETHER NEUROGENIC CLAUDICATION PRESENT: ICD-10-CM

## 2021-01-14 DIAGNOSIS — G89.29 CHRONIC PAIN OF LEFT KNEE: ICD-10-CM

## 2021-01-14 DIAGNOSIS — M79.672 PAIN OF LEFT HEEL: ICD-10-CM

## 2021-01-14 DIAGNOSIS — G89.29 CHRONIC BILATERAL LOW BACK PAIN WITHOUT SCIATICA: Primary | ICD-10-CM

## 2021-01-14 DIAGNOSIS — M25.571 CHRONIC PAIN OF RIGHT ANKLE: ICD-10-CM

## 2021-01-14 PROCEDURE — 99441 PR PHYS/QHP TELEPHONE EVALUATION 5-10 MIN: CPT | Performed by: PHYSICAL MEDICINE & REHABILITATION

## 2021-01-14 RX ORDER — OXYCODONE AND ACETAMINOPHEN 10; 325 MG/1; MG/1
1 TABLET ORAL EVERY 6 HOURS PRN
Qty: 120 TABLET | Refills: 0 | Status: SHIPPED | OUTPATIENT
Start: 2021-01-14 | End: 2021-03-11 | Stop reason: SDUPTHER

## 2021-01-14 RX ORDER — OXYCODONE AND ACETAMINOPHEN 10; 325 MG/1; MG/1
1 TABLET ORAL EVERY 6 HOURS PRN
Qty: 120 TABLET | Refills: 0 | Status: SHIPPED | OUTPATIENT
Start: 2021-01-14 | End: 2021-03-11

## 2021-01-14 NOTE — PROGRESS NOTES
Subjective   Seda EMELINA Urbina is a 60 y.o. female.     LBP, b/l knee pain, right ankle pain, 10/10 at worst, 6/10 at best, always present, varies, began years ago, worsening, stabbing, burning, worse with all activity, interferes with ADLs, appetite, activity, failed surgery, PT, chiropractor, massage. X-ray b/l knees shows stable TKAs. Prior notes reviewed, as above, with referral for long-term opioid therapy which in the surgeon's opinion is necessary, s/p L TKA x 3, R TKA, failed Norco 5mg and Percocet 5mg, adequate relief with Norco 10mg TID prn. Has FH of substance abuse.       The following portions of the patient's history were reviewed and updated as appropriate: allergies, current medications, past family history, past medical history, past social history, past surgical history and problem list.    Review of Systems   Constitutional: Negative for chills, fatigue and fever.   HENT: Negative for hearing loss and trouble swallowing.    Eyes: Positive for visual disturbance.   Respiratory: Negative for shortness of breath.    Cardiovascular: Negative for chest pain.   Gastrointestinal: Positive for nausea. Negative for abdominal pain, constipation, diarrhea and vomiting.   Genitourinary: Negative for urinary incontinence.   Musculoskeletal: Positive for arthralgias, back pain and joint swelling. Negative for myalgias and neck pain.   Neurological: Positive for headache. Negative for dizziness, weakness and numbness.       Objective   Physical Exam   Constitutional: She is oriented to person, place, and time.   Musculoskeletal:      Comments: Signficant L TKA scar, typical R TKA scar   Neurological: She is alert and oriented to person, place, and time. She has normal reflexes.   Psychiatric: Her behavior is normal. Thought content normal.         Assessment/Plan   Diagnoses and all orders for this visit:    1. Chronic bilateral low back pain without sciatica (Primary)    2. Lumbar spondylosis    3. Spinal  stenosis of lumbar region, unspecified whether neurogenic claudication present    4. Chronic pain of left knee    5. Chronic pain of right ankle    6. DDD (degenerative disc disease), lumbar    7. Pain of left heel        Inspect reviewed, in order, filled 12/18/20. UDS in order 2/20/20.  Treatment plan will consist of continuing current medication as long as it remains effective and is necessary, while evaluating patient at each visit and determining if the medication can be lowered or discontinued, while also using nonopioid therapies to reduce reliance on opioids.  Restarted Norco 10mg TID prn with poor relief, rotated to Percocet 10mg TID prn, increase to QID prn.  May consider LESIs in future.  Began Lidoderm.  RTC 2 months for f/u. Telephone visit, spent 5 minutes discussing her medications and plan of care.

## 2021-01-18 DIAGNOSIS — M48.061 SPINAL STENOSIS OF LUMBAR REGION, UNSPECIFIED WHETHER NEUROGENIC CLAUDICATION PRESENT: ICD-10-CM

## 2021-01-18 RX ORDER — GABAPENTIN 800 MG/1
TABLET ORAL
Qty: 90 TABLET | Refills: 0 | Status: SHIPPED | OUTPATIENT
Start: 2021-01-18 | End: 2021-02-15

## 2021-02-13 DIAGNOSIS — M48.061 SPINAL STENOSIS OF LUMBAR REGION, UNSPECIFIED WHETHER NEUROGENIC CLAUDICATION PRESENT: ICD-10-CM

## 2021-02-15 RX ORDER — GABAPENTIN 800 MG/1
TABLET ORAL
Qty: 90 TABLET | Refills: 0 | Status: SHIPPED | OUTPATIENT
Start: 2021-02-15 | End: 2021-03-11

## 2021-03-10 ENCOUNTER — TELEPHONE (OUTPATIENT)
Dept: PAIN MEDICINE | Facility: CLINIC | Age: 61
End: 2021-03-10

## 2021-03-10 DIAGNOSIS — G89.29 CHRONIC BILATERAL LOW BACK PAIN WITHOUT SCIATICA: ICD-10-CM

## 2021-03-10 DIAGNOSIS — M54.50 CHRONIC BILATERAL LOW BACK PAIN WITHOUT SCIATICA: ICD-10-CM

## 2021-03-10 DIAGNOSIS — M48.061 SPINAL STENOSIS OF LUMBAR REGION, UNSPECIFIED WHETHER NEUROGENIC CLAUDICATION PRESENT: ICD-10-CM

## 2021-03-10 NOTE — TELEPHONE ENCOUNTER
Provider: DR SANTANA   Caller: MRS HERRING   Relationship to Patient: PATIENT   Phone Number: [phone]  Reason for Call: PATIENT CALLED IN HAD FAMILY EMERGENCY NOT ABLE TO DO IN PERSON VISIT WAS WANTING TO SEE IF THIS VISIT CAN BE SWITCHED TO TELEHEALTH VISIT.   When was the patient last seen: 11/12/20 TELEHEALTH

## 2021-03-11 DIAGNOSIS — G89.29 CHRONIC BILATERAL LOW BACK PAIN WITHOUT SCIATICA: ICD-10-CM

## 2021-03-11 DIAGNOSIS — M54.50 CHRONIC BILATERAL LOW BACK PAIN WITHOUT SCIATICA: ICD-10-CM

## 2021-03-11 RX ORDER — OXYCODONE AND ACETAMINOPHEN 10; 325 MG/1; MG/1
1 TABLET ORAL EVERY 6 HOURS PRN
Qty: 120 TABLET | Refills: 0 | Status: SHIPPED | OUTPATIENT
Start: 2021-03-11 | End: 2021-04-09 | Stop reason: SDUPTHER

## 2021-03-11 RX ORDER — GABAPENTIN 800 MG/1
TABLET ORAL
Qty: 90 TABLET | Refills: 0 | Status: SHIPPED | OUTPATIENT
Start: 2021-03-11 | End: 2021-04-16

## 2021-03-11 RX ORDER — OXYCODONE AND ACETAMINOPHEN 10; 325 MG/1; MG/1
TABLET ORAL
Qty: 120 TABLET | Refills: 0 | Status: SHIPPED | OUTPATIENT
Start: 2021-03-11 | End: 2021-04-09 | Stop reason: SDUPTHER

## 2021-03-12 ENCOUNTER — APPOINTMENT (OUTPATIENT)
Dept: PAIN MEDICINE | Facility: CLINIC | Age: 61
End: 2021-03-12

## 2021-04-06 ENCOUNTER — TELEPHONE (OUTPATIENT)
Dept: PAIN MEDICINE | Facility: CLINIC | Age: 61
End: 2021-04-06

## 2021-04-09 ENCOUNTER — OFFICE VISIT (OUTPATIENT)
Dept: PAIN MEDICINE | Facility: CLINIC | Age: 61
End: 2021-04-09

## 2021-04-09 VITALS — WEIGHT: 155 LBS | BODY MASS INDEX: 24.91 KG/M2 | HEIGHT: 66 IN

## 2021-04-09 DIAGNOSIS — M48.061 SPINAL STENOSIS OF LUMBAR REGION, UNSPECIFIED WHETHER NEUROGENIC CLAUDICATION PRESENT: ICD-10-CM

## 2021-04-09 DIAGNOSIS — M25.562 CHRONIC PAIN OF BOTH KNEES: ICD-10-CM

## 2021-04-09 DIAGNOSIS — M51.36 DDD (DEGENERATIVE DISC DISEASE), LUMBAR: ICD-10-CM

## 2021-04-09 DIAGNOSIS — M25.561 CHRONIC PAIN OF BOTH KNEES: ICD-10-CM

## 2021-04-09 DIAGNOSIS — M54.50 CHRONIC BILATERAL LOW BACK PAIN WITHOUT SCIATICA: Primary | ICD-10-CM

## 2021-04-09 DIAGNOSIS — M25.571 CHRONIC PAIN OF RIGHT ANKLE: ICD-10-CM

## 2021-04-09 DIAGNOSIS — G89.29 CHRONIC PAIN OF RIGHT ANKLE: ICD-10-CM

## 2021-04-09 DIAGNOSIS — G89.29 CHRONIC PAIN OF LEFT KNEE: ICD-10-CM

## 2021-04-09 DIAGNOSIS — M25.562 CHRONIC PAIN OF LEFT KNEE: ICD-10-CM

## 2021-04-09 DIAGNOSIS — G89.29 CHRONIC PAIN OF BOTH KNEES: ICD-10-CM

## 2021-04-09 DIAGNOSIS — G89.29 CHRONIC BILATERAL LOW BACK PAIN WITHOUT SCIATICA: Primary | ICD-10-CM

## 2021-04-09 DIAGNOSIS — M25.562 ARTHRALGIA OF KNEE, LEFT: ICD-10-CM

## 2021-04-09 DIAGNOSIS — M47.816 LUMBAR SPONDYLOSIS: ICD-10-CM

## 2021-04-09 PROCEDURE — 99441 PR PHYS/QHP TELEPHONE EVALUATION 5-10 MIN: CPT | Performed by: PHYSICAL MEDICINE & REHABILITATION

## 2021-04-09 RX ORDER — OXYCODONE AND ACETAMINOPHEN 10; 325 MG/1; MG/1
1 TABLET ORAL EVERY 6 HOURS PRN
Qty: 120 TABLET | Refills: 0 | Status: SHIPPED | OUTPATIENT
Start: 2021-04-09 | End: 2021-06-03 | Stop reason: SDUPTHER

## 2021-04-09 RX ORDER — OXYCODONE AND ACETAMINOPHEN 10; 325 MG/1; MG/1
1 TABLET ORAL EVERY 6 HOURS PRN
Qty: 120 TABLET | Refills: 0 | Status: SHIPPED | OUTPATIENT
Start: 2021-04-09 | End: 2021-06-15 | Stop reason: SDUPTHER

## 2021-04-09 NOTE — PROGRESS NOTES
Subjective   Seda EMELINA Urbina is a 60 y.o. female.     LBP, b/l knee pain, right ankle pain, 10/10 at worst, 6/10 at best, always present, varies, began years ago, worsening, stabbing, burning, worse with all activity, interferes with ADLs, appetite, activity, failed surgery, PT, chiropractor, massage. X-ray b/l knees shows stable TKAs. Prior notes reviewed, as above, with referral for long-term opioid therapy which in the surgeon's opinion is necessary, s/p L TKA x 3, R TKA, failed Norco 5mg and Percocet 5mg, adequate relief with Norco 10mg TID prn. Has FH of substance abuse.       The following portions of the patient's history were reviewed and updated as appropriate: allergies, current medications, past family history, past medical history, past social history, past surgical history and problem list.    Review of Systems   Constitutional: Negative for chills, fatigue and fever.   HENT: Negative for hearing loss and trouble swallowing.    Eyes: Positive for visual disturbance.   Respiratory: Negative for shortness of breath.    Cardiovascular: Negative for chest pain.   Gastrointestinal: Positive for nausea. Negative for abdominal pain, constipation, diarrhea and vomiting.   Genitourinary: Negative for urinary incontinence.   Musculoskeletal: Positive for arthralgias, back pain and joint swelling. Negative for myalgias and neck pain.   Neurological: Positive for headache. Negative for dizziness, weakness and numbness.       Objective   Physical Exam   Constitutional: She is oriented to person, place, and time.   Musculoskeletal:      Comments: Signficant L TKA scar, typical R TKA scar   Neurological: She is alert and oriented to person, place, and time. She has normal reflexes.   Psychiatric: Her behavior is normal. Thought content normal.         Assessment/Plan   Diagnoses and all orders for this visit:    1. Chronic bilateral low back pain without sciatica (Primary)    2. Lumbar spondylosis    3. Spinal  stenosis of lumbar region, unspecified whether neurogenic claudication present    4. Chronic pain of left knee    5. Chronic pain of right ankle    6. Arthralgia of knee, left    7. DDD (degenerative disc disease), lumbar    8. Chronic pain of both knees        Inspect reviewed, in order, filled 3/12/21. UDS in order 2/20/20.  Treatment plan will consist of continuing current medication as long as it remains effective and is necessary, while evaluating patient at each visit and determining if the medication can be lowered or discontinued, while also using nonopioid therapies to reduce reliance on opioids.  Restarted Norco 10mg TID prn with poor relief, rotated to Percocet 10mg TID prn, increased to QID prn.  May consider LESIs in future.  Began Lidoderm.  RTC 2 months for f/u. Telephone visit, spent 5 minutes discussing her medications and plan of care.

## 2021-04-16 DIAGNOSIS — M48.061 SPINAL STENOSIS OF LUMBAR REGION, UNSPECIFIED WHETHER NEUROGENIC CLAUDICATION PRESENT: ICD-10-CM

## 2021-04-16 RX ORDER — GABAPENTIN 800 MG/1
TABLET ORAL
Qty: 90 TABLET | Refills: 0 | Status: SHIPPED | OUTPATIENT
Start: 2021-04-16 | End: 2021-05-21

## 2021-05-21 DIAGNOSIS — M48.061 SPINAL STENOSIS OF LUMBAR REGION, UNSPECIFIED WHETHER NEUROGENIC CLAUDICATION PRESENT: ICD-10-CM

## 2021-05-21 RX ORDER — GABAPENTIN 800 MG/1
TABLET ORAL
Qty: 90 TABLET | Refills: 0 | Status: SHIPPED | OUTPATIENT
Start: 2021-05-21 | End: 2021-06-15 | Stop reason: SDUPTHER

## 2021-05-26 ENCOUNTER — TELEPHONE (OUTPATIENT)
Dept: PAIN MEDICINE | Facility: CLINIC | Age: 61
End: 2021-05-26

## 2021-05-26 NOTE — TELEPHONE ENCOUNTER
Caller: PATIENT    Relationship to patient: SELF    Best call back number: [phone]      Type of visit: 2 MONTH FOLLOW UP     Requested date: 06/16/21     If rescheduling, when is the original appointment: 06/08/21    Additional notes:PT. STATES THAT SHE THOUGHT HER APPT. WAS ON 06/16/21, BUT IT IS ON  06/08/21.   PT. STATES THAT SHE WILL BE OUT OF TOWN ON THE 8TH.   WOULD LIKE TO KNOW IF DR. SANTANA CAN MOVE IT TO THE 16TH?   NEXT OPENING TO RESCHEDULE IS NOT UNTIL July 1ST.   PLEASE ADVISE.

## 2021-06-03 ENCOUNTER — TELEPHONE (OUTPATIENT)
Dept: PAIN MEDICINE | Facility: CLINIC | Age: 61
End: 2021-06-03

## 2021-06-03 DIAGNOSIS — G89.29 CHRONIC BILATERAL LOW BACK PAIN WITHOUT SCIATICA: ICD-10-CM

## 2021-06-03 DIAGNOSIS — M54.50 CHRONIC BILATERAL LOW BACK PAIN WITHOUT SCIATICA: ICD-10-CM

## 2021-06-03 RX ORDER — OXYCODONE AND ACETAMINOPHEN 10; 325 MG/1; MG/1
1 TABLET ORAL EVERY 6 HOURS PRN
Qty: 120 TABLET | Refills: 0 | Status: SHIPPED | OUTPATIENT
Start: 2021-06-03 | End: 2021-06-15 | Stop reason: SDUPTHER

## 2021-06-08 ENCOUNTER — APPOINTMENT (OUTPATIENT)
Dept: PAIN MEDICINE | Facility: CLINIC | Age: 61
End: 2021-06-08

## 2021-06-15 ENCOUNTER — OFFICE VISIT (OUTPATIENT)
Dept: PAIN MEDICINE | Facility: CLINIC | Age: 61
End: 2021-06-15

## 2021-06-15 VITALS
WEIGHT: 155 LBS | RESPIRATION RATE: 16 BRPM | HEIGHT: 66 IN | DIASTOLIC BLOOD PRESSURE: 88 MMHG | OXYGEN SATURATION: 96 % | BODY MASS INDEX: 24.91 KG/M2 | SYSTOLIC BLOOD PRESSURE: 143 MMHG | HEART RATE: 84 BPM

## 2021-06-15 DIAGNOSIS — M25.562 CHRONIC PAIN OF BOTH KNEES: ICD-10-CM

## 2021-06-15 DIAGNOSIS — M51.36 DDD (DEGENERATIVE DISC DISEASE), LUMBAR: ICD-10-CM

## 2021-06-15 DIAGNOSIS — M47.816 LUMBAR SPONDYLOSIS: ICD-10-CM

## 2021-06-15 DIAGNOSIS — G89.29 CHRONIC BILATERAL LOW BACK PAIN WITHOUT SCIATICA: ICD-10-CM

## 2021-06-15 DIAGNOSIS — G89.29 CHRONIC PAIN OF LEFT KNEE: ICD-10-CM

## 2021-06-15 DIAGNOSIS — M25.561 CHRONIC PAIN OF BOTH KNEES: ICD-10-CM

## 2021-06-15 DIAGNOSIS — G89.29 CHRONIC PAIN OF RIGHT ANKLE: ICD-10-CM

## 2021-06-15 DIAGNOSIS — M48.061 SPINAL STENOSIS OF LUMBAR REGION, UNSPECIFIED WHETHER NEUROGENIC CLAUDICATION PRESENT: ICD-10-CM

## 2021-06-15 DIAGNOSIS — G89.29 CHRONIC PAIN OF BOTH KNEES: ICD-10-CM

## 2021-06-15 DIAGNOSIS — M25.562 CHRONIC PAIN OF LEFT KNEE: ICD-10-CM

## 2021-06-15 DIAGNOSIS — M25.571 CHRONIC PAIN OF RIGHT ANKLE: ICD-10-CM

## 2021-06-15 DIAGNOSIS — Z79.899 HIGH RISK MEDICATION USE: Primary | ICD-10-CM

## 2021-06-15 DIAGNOSIS — M54.50 CHRONIC BILATERAL LOW BACK PAIN WITHOUT SCIATICA: ICD-10-CM

## 2021-06-15 PROCEDURE — 99214 OFFICE O/P EST MOD 30 MIN: CPT | Performed by: PHYSICAL MEDICINE & REHABILITATION

## 2021-06-15 RX ORDER — OXYCODONE AND ACETAMINOPHEN 10; 325 MG/1; MG/1
1 TABLET ORAL EVERY 6 HOURS PRN
Qty: 120 TABLET | Refills: 0 | Status: SHIPPED | OUTPATIENT
Start: 2021-06-15

## 2021-06-15 RX ORDER — LISINOPRIL 10 MG/1
10 TABLET ORAL DAILY
COMMUNITY
Start: 2021-05-12

## 2021-06-15 RX ORDER — GABAPENTIN 800 MG/1
800 TABLET ORAL 3 TIMES DAILY
Qty: 90 TABLET | Refills: 2 | Status: SHIPPED | OUTPATIENT
Start: 2021-06-15

## 2021-06-15 NOTE — PROGRESS NOTES
Subjective   Seda Urbina is a 61 y.o. female.     LBP, b/l knee pain, right ankle pain, 10/10 at worst, 6/10 at best, always present, varies, began years ago, worsening, stabbing, burning, worse with all activity, interferes with aDLs, appetite, activity, failed surgery, PT, chiropractor, massage. X-ray b/l knees shows stable TKAs. Prior notes reviewed, as above, with referral for long-term opioid therapy which in the surgeon's opinion is necessary, s/p L TKA x 3, R TKA, failed Norco 5mg and Percocet 5mg, adequate relief with Norco 10mg TID prn. Has FH of substance abuse. Taking Percocet 10mg QID prn with good relief. Had L genicular RFA at [facility] with good relief, requesting repeat.       The following portions of the patient's history were reviewed and updated as appropriate: allergies, current medications, past family history, past medical history, past social history, past surgical history and problem list.    Review of Systems   Constitutional: Negative for chills, fatigue and fever.   HENT: Negative for hearing loss and trouble swallowing.    Eyes: Positive for visual disturbance.   Respiratory: Negative for shortness of breath.    Cardiovascular: Negative for chest pain.   Gastrointestinal: Positive for nausea. Negative for abdominal pain, constipation, diarrhea and vomiting.   Genitourinary: Negative for urinary incontinence.   Musculoskeletal: Positive for arthralgias, back pain and joint swelling. Negative for myalgias and neck pain.   Neurological: Positive for headache. Negative for dizziness, weakness and numbness.       Objective   Physical Exam   Constitutional: She is oriented to person, place, and time. She appears well-developed and well-nourished.   HENT:   Head: Normocephalic and atraumatic.   Eyes: Pupils are equal, round, and reactive to light.   Cardiovascular: Normal rate, regular rhythm and normal heart sounds.   Pulmonary/Chest: Breath sounds normal.   Abdominal: Soft. Bowel  sounds are normal. She exhibits no distension. There is no abdominal tenderness.   Musculoskeletal:      Comments: Signficant L TKA scar, typical R TKA scar   Neurological: She is alert and oriented to person, place, and time. She has normal reflexes. She displays normal reflexes. No sensory deficit.   Psychiatric: Her behavior is normal. Thought content normal.         Assessment/Plan   Diagnoses and all orders for this visit:    1. Chronic bilateral low back pain without sciatica (Primary)    2. Lumbar spondylosis    3. Spinal stenosis of lumbar region, unspecified whether neurogenic claudication present    4. Chronic pain of left knee    5. Chronic pain of right ankle    6. DDD (degenerative disc disease), lumbar    7. Chronic pain of both knees        Inspect reviewed, in order, filled 3/12/21. UDS in order 2/20/20. Repeat UDS, likely with small amount of Diazepam used weaning benzos.  Treatment plan will consist of continuing current medication as long as it remains effective and is necessary, while evaluating patient at each visit and determining if the medication can be lowered or discontinued, while also using nonopioid therapies to reduce reliance on opioids.  Restarted Norco 10mg TID prn with poor relief, rotated to Percocet 10mg TID prn, increased to QID prn.  Cont Gabapentin 800mg TID.  May consider LESIs in future.  Began Lidoderm.  Schedule 2 left genicular blocks for RFA.  RTC for procedures. Fills in [place].

## 2021-06-21 ENCOUNTER — TELEPHONE (OUTPATIENT)
Dept: PAIN MEDICINE | Facility: CLINIC | Age: 61
End: 2021-06-21

## 2021-06-21 NOTE — TELEPHONE ENCOUNTER
Caller: Seda Urbina    Relationship to patient: Self    Best call back number:     Patient is needing: PATIENT HAS FLUID IN LEGS AND FEET AND IS REQUESTING DR. SANTANA TO PLEASE FILL A MEDICATION FOR THE FLUID.    PREFERRED PHARMACY:

## 2021-06-22 NOTE — TELEPHONE ENCOUNTER
Provider: DR. SANTANA  Caller: ALANA HERRING  Relationship to Patient: SELF     Phone Number: [phone]  Reason for Call: PATIENT IS FOLLOWING UP ON ORIGINAL MESSAGE LEFT THIS MORNING & YESTERDAY CONCERNING MEDICATION & FLUID ON LEGS/FEET.

## 2021-06-22 NOTE — TELEPHONE ENCOUNTER
Provider: DR. SANTANA  Caller: ALANA HERRING  Relationship to Patient: SELF  Pharmacy: [facility]  Phone Number: [phone]  Reason for Call: PT STATED PHARMACY WON'T FILL PRESCRIPTION BECAUSE IT STATES 4X DAILY, NOT 3X DAILY. PHARMACY NEEDS RX CHANGED TO 3X DAILY.

## 2021-06-22 NOTE — TELEPHONE ENCOUNTER
I don't manage edema, I have very little training in that area, she will need to contact her PCP, thanks.

## 2021-07-08 ENCOUNTER — APPOINTMENT (OUTPATIENT)
Dept: PAIN MEDICINE | Facility: HOSPITAL | Age: 61
End: 2021-07-08

## 2021-07-08 ENCOUNTER — TELEPHONE (OUTPATIENT)
Dept: PAIN MEDICINE | Facility: CLINIC | Age: 61
End: 2021-07-08

## 2021-07-14 ENCOUNTER — TELEPHONE (OUTPATIENT)
Dept: PAIN MEDICINE | Facility: CLINIC | Age: 61
End: 2021-07-14

## 2021-07-14 NOTE — TELEPHONE ENCOUNTER
I've reviewed the x-rays. We will probably skip the superior lateral RFA, and only perform superior medial and inferior medial given the hardware on the lateral distal femur. She had the RFA done before at [facility] with good results. Was that prior to her knee replacement? Thanks.

## 2021-07-14 NOTE — TELEPHONE ENCOUNTER
Provider: DR. SANTANA  Caller: ALANA HERRING  Relationship to Patient: SELF  Phone Number: [phone]  Reason for Call: PT WANTS DR SANTANA TO REVIEW HER PREVIOUS XRAY TAKEN OF HER LEFT XRAY (08.06.19). SHE WANTS HIM TO REVIEW PRIOR TO PROCEDURE.

## 2021-07-15 ENCOUNTER — APPOINTMENT (OUTPATIENT)
Dept: PAIN MEDICINE | Facility: HOSPITAL | Age: 61
End: 2021-07-15

## 2021-07-19 ENCOUNTER — TELEPHONE (OUTPATIENT)
Dept: PAIN MEDICINE | Facility: CLINIC | Age: 61
End: 2021-07-19

## 2021-07-19 NOTE — TELEPHONE ENCOUNTER
7/19/21-DR SANTANA-- PLEASE CALL PHARMACIST - TIARA-- I HAVE SPOKEN TO HER PERSONALLY ON PT MEDS AND SHE WANTS TO TALK TO YOU AGAIN PLEASE CALL PHARMACIST-- PHARMACIST REQUEST!!!----  [facility] FILLED  LAST ON July 8TH-- AND SHE HAS STATED THEY HAVE GOTTEN STOLLEN AGAIN - PHARMACIST- TIARA PHONE # is [phone] EXT [phone]--PLEASE CALL
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
## 2021-07-19 NOTE — TELEPHONE ENCOUNTER
7/19/21-- we had documented pill ct was due on 7/27/21-- pt in meantime has talked to Sue - read her message-leena
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
## 2021-07-19 NOTE — TELEPHONE ENCOUNTER
OK, thanks. We'll see if she comes in. But if she only has 7 pills, that's going to be a failed pill count.

## 2021-07-19 NOTE — TELEPHONE ENCOUNTER
Hub staff attempted to follow warm transfer process and was unsuccessful     Caller: TIARA    Relationship to patient: PHARMACY    Best call back number: [phone]    Patient is needing: PHARMACY NEEDING TO SPEAK WITH DR. ESTHER JIMENEZ IN REGARDS TO PT'S MEDICATIONS. STATED PHONES WON'T ROLL OVER UNTIL AFTER 9:00AM, BUT NEEDS TO SPEAK TO

## 2021-07-20 NOTE — TELEPHONE ENCOUNTER
She did not show for a pill count, plus has acted very suspiciously per the pharmacist with whom I spoke. We will have to discharge her, thanks.

## 2021-08-05 ENCOUNTER — APPOINTMENT (OUTPATIENT)
Dept: PAIN MEDICINE | Facility: HOSPITAL | Age: 61
End: 2021-08-05

## 2022-01-17 NOTE — DISCHARGE PLACEMENT REQUEST
"Alana Herring (57 y.o. Female)     Date of Birth Social Security Number Address Home Phone MRN    1960  [address] [place] [address] [phone] [id number]    [place] Marital Status          [employer] Single       Admission Date Admission Type Admitting Provider Attending Provider Department, Room/Bed    11/18/17 Emergency Yoav Madsen MD Ahmed, Aftab, MD [address], 503/1    Discharge Date Discharge Disposition Discharge Destination                      Attending Provider: Yoav Madsen MD     Allergies:  Keflex [Cephalexin], Metoprolol    Isolation:  None   Infection:  None   Code Status:  Not on file    Ht:  66\" (167.6 cm)   Wt:  178 lb (80.7 kg)    Admission Cmt:  None   Principal Problem:  None                Active Insurance as of 11/18/2017     Primary Coverage     Payor Plan Insurance Group Employer/Plan Group    [employer] [place] [facility]      Payor Plan Address Payor Plan Phone Number Effective From Effective To    [address] [address] [phone] 2/1/2017     [place] [address]       Subscriber Name Subscriber Birth Date Member ID       ALANA HERRING 1960 [id number]                 Emergency Contacts      (Rel.) Home Phone Work Phone Mobile Phone    Miguel Angel La (Friend) [phone] -- --              " Pt has an appt scheduled for 1/26/22.

## 2022-04-27 ENCOUNTER — APPOINTMENT (OUTPATIENT)
Dept: GENERAL RADIOLOGY | Facility: HOSPITAL | Age: 62
End: 2022-04-27

## 2022-04-27 ENCOUNTER — HOSPITAL ENCOUNTER (EMERGENCY)
Facility: HOSPITAL | Age: 62
Discharge: HOME | End: 2022-04-27
Attending: EMERGENCY MEDICINE | Admitting: EMERGENCY MEDICINE

## 2022-04-27 VITALS
HEART RATE: 91 BPM | BODY MASS INDEX: 26.52 KG/M2 | DIASTOLIC BLOOD PRESSURE: 101 MMHG | WEIGHT: 165 LBS | SYSTOLIC BLOOD PRESSURE: 152 MMHG | HEIGHT: 66 IN | RESPIRATION RATE: 16 BRPM | TEMPERATURE: 97.1 F | OXYGEN SATURATION: 97 %

## 2022-04-27 DIAGNOSIS — Z59.00 HOMELESSNESS: ICD-10-CM

## 2022-04-27 DIAGNOSIS — M54.50 CHRONIC LOW BACK PAIN WITHOUT SCIATICA, UNSPECIFIED BACK PAIN LATERALITY: Primary | ICD-10-CM

## 2022-04-27 DIAGNOSIS — F43.9 SITUATIONAL STRESS: ICD-10-CM

## 2022-04-27 DIAGNOSIS — G89.29 CHRONIC LOW BACK PAIN WITHOUT SCIATICA, UNSPECIFIED BACK PAIN LATERALITY: Primary | ICD-10-CM

## 2022-04-27 LAB
ALBUMIN SERPL-MCNC: 4.4 G/DL (ref 3.5–5.2)
ALBUMIN/GLOB SERPL: 1.8 G/DL
ALP SERPL-CCNC: 62 U/L (ref 39–117)
ALT SERPL W P-5'-P-CCNC: 23 U/L (ref 1–33)
AMPHET+METHAMPHET UR QL: NEGATIVE
ANION GAP SERPL CALCULATED.3IONS-SCNC: 12 MMOL/L (ref 5–15)
AST SERPL-CCNC: 20 U/L (ref 1–32)
BARBITURATES UR QL SCN: NEGATIVE
BASOPHILS # BLD AUTO: 0.08 10*3/MM3 (ref 0–0.2)
BASOPHILS NFR BLD AUTO: 0.7 % (ref 0–1.5)
BENZODIAZ UR QL SCN: POSITIVE
BILIRUB SERPL-MCNC: 0.2 MG/DL (ref 0–1.2)
BUN SERPL-MCNC: 10 MG/DL (ref 8–23)
BUN/CREAT SERPL: 13.5 (ref 7–25)
CALCIUM SPEC-SCNC: 9.7 MG/DL (ref 8.6–10.5)
CANNABINOIDS SERPL QL: NEGATIVE
CHLORIDE SERPL-SCNC: 103 MMOL/L (ref 98–107)
CO2 SERPL-SCNC: 25 MMOL/L (ref 22–29)
COCAINE UR QL: NEGATIVE
CREAT SERPL-MCNC: 0.74 MG/DL (ref 0.57–1)
DEPRECATED RDW RBC AUTO: 41.5 FL (ref 37–54)
EGFRCR SERPLBLD CKD-EPI 2021: 92.2 ML/MIN/1.73
EOSINOPHIL # BLD AUTO: 0.35 10*3/MM3 (ref 0–0.4)
EOSINOPHIL NFR BLD AUTO: 3 % (ref 0.3–6.2)
ERYTHROCYTE [DISTWIDTH] IN BLOOD BY AUTOMATED COUNT: 11.9 % (ref 12.3–15.4)
ETHANOL BLD-MCNC: <10 MG/DL (ref 0–10)
ETHANOL UR QL: <0.01 %
GLOBULIN UR ELPH-MCNC: 2.5 GM/DL
GLUCOSE SERPL-MCNC: 91 MG/DL (ref 65–99)
HCT VFR BLD AUTO: 38.8 % (ref 34–46.6)
HGB BLD-MCNC: 13.1 G/DL (ref 12–15.9)
IMM GRANULOCYTES # BLD AUTO: 0.03 10*3/MM3 (ref 0–0.05)
IMM GRANULOCYTES NFR BLD AUTO: 0.3 % (ref 0–0.5)
LYMPHOCYTES # BLD AUTO: 2.07 10*3/MM3 (ref 0.7–3.1)
LYMPHOCYTES NFR BLD AUTO: 17.6 % (ref 19.6–45.3)
MCH RBC QN AUTO: 31.7 PG (ref 26.6–33)
MCHC RBC AUTO-ENTMCNC: 33.8 G/DL (ref 31.5–35.7)
MCV RBC AUTO: 93.9 FL (ref 79–97)
METHADONE UR QL SCN: NEGATIVE
MONOCYTES # BLD AUTO: 0.83 10*3/MM3 (ref 0.1–0.9)
MONOCYTES NFR BLD AUTO: 7.1 % (ref 5–12)
NEUTROPHILS NFR BLD AUTO: 71.3 % (ref 42.7–76)
NEUTROPHILS NFR BLD AUTO: 8.37 10*3/MM3 (ref 1.7–7)
NRBC BLD AUTO-RTO: 0 /100 WBC (ref 0–0.2)
OPIATES UR QL: NEGATIVE
OXYCODONE UR QL SCN: NEGATIVE
PLATELET # BLD AUTO: 336 10*3/MM3 (ref 140–450)
PMV BLD AUTO: 10.5 FL (ref 6–12)
POTASSIUM SERPL-SCNC: 3.5 MMOL/L (ref 3.5–5.2)
PROT SERPL-MCNC: 6.9 G/DL (ref 6–8.5)
RBC # BLD AUTO: 4.13 10*6/MM3 (ref 3.77–5.28)
SODIUM SERPL-SCNC: 140 MMOL/L (ref 136–145)
WBC NRBC COR # BLD: 11.73 10*3/MM3 (ref 3.4–10.8)

## 2022-04-27 PROCEDURE — 80307 DRUG TEST PRSMV CHEM ANLYZR: CPT | Performed by: NURSE PRACTITIONER

## 2022-04-27 PROCEDURE — 99283 EMERGENCY DEPT VISIT LOW MDM: CPT

## 2022-04-27 PROCEDURE — 82077 ASSAY SPEC XCP UR&BREATH IA: CPT | Performed by: NURSE PRACTITIONER

## 2022-04-27 PROCEDURE — 72110 X-RAY EXAM L-2 SPINE 4/>VWS: CPT

## 2022-04-27 PROCEDURE — 85025 COMPLETE CBC W/AUTO DIFF WBC: CPT | Performed by: NURSE PRACTITIONER

## 2022-04-27 PROCEDURE — 80053 COMPREHEN METABOLIC PANEL: CPT | Performed by: NURSE PRACTITIONER

## 2022-04-27 SDOH — ECONOMIC STABILITY - HOUSING INSECURITY: HOMELESSNESS UNSPECIFIED: Z59.00

## 2022-05-19 ENCOUNTER — HOSPITAL ENCOUNTER (EMERGENCY)
Facility: HOSPITAL | Age: 62
Discharge: HOME | End: 2022-05-19
Attending: EMERGENCY MEDICINE | Admitting: EMERGENCY MEDICINE

## 2022-05-19 VITALS
DIASTOLIC BLOOD PRESSURE: 92 MMHG | SYSTOLIC BLOOD PRESSURE: 155 MMHG | RESPIRATION RATE: 18 BRPM | TEMPERATURE: 98.4 F | OXYGEN SATURATION: 96 % | HEART RATE: 78 BPM

## 2022-05-19 DIAGNOSIS — M79.605 BILATERAL LEG PAIN: ICD-10-CM

## 2022-05-19 DIAGNOSIS — G89.29 CHRONIC BILATERAL LOW BACK PAIN WITHOUT SCIATICA: Primary | ICD-10-CM

## 2022-05-19 DIAGNOSIS — M79.604 BILATERAL LEG PAIN: ICD-10-CM

## 2022-05-19 DIAGNOSIS — M54.50 CHRONIC BILATERAL LOW BACK PAIN WITHOUT SCIATICA: Primary | ICD-10-CM

## 2022-05-19 PROCEDURE — 96372 THER/PROPH/DIAG INJ SC/IM: CPT

## 2022-05-19 PROCEDURE — 25010000002 KETOROLAC TROMETHAMINE PER 15 MG: Performed by: NURSE PRACTITIONER

## 2022-05-19 PROCEDURE — 99283 EMERGENCY DEPT VISIT LOW MDM: CPT

## 2022-05-19 RX ORDER — LIDOCAINE 50 MG/G
1 PATCH TOPICAL
Status: DISCONTINUED | OUTPATIENT
Start: 2022-05-19 | End: 2022-05-19 | Stop reason: HOSPADM

## 2022-05-19 RX ORDER — KETOROLAC TROMETHAMINE 30 MG/ML
30 INJECTION, SOLUTION INTRAMUSCULAR; INTRAVENOUS ONCE
Status: COMPLETED | OUTPATIENT
Start: 2022-05-19 | End: 2022-05-19

## 2022-05-19 RX ADMIN — KETOROLAC TROMETHAMINE 30 MG: 30 INJECTION, SOLUTION INTRAMUSCULAR at 18:10

## 2022-05-19 NOTE — ED PROVIDER NOTES
EMERGENCY DEPARTMENT ENCOUNTER    Room Number:  03/03  Date of encounter:  5/19/2022  PCP: Elkin Paris MD  Historian: patient   Full history not obtainable due to: none     HPI:  Chief Complaint: Back pain    Context: Seda Urbina is a 62 y.o. female with a hx of chronic back pain who presents to the ED c/o back pain onset several weeks ago. Pain is constant and across the lower back. Worse on the left side. Radiates into the BLE. She reports falling a few times about 1 month ago after having to walk all night long due to a falling out with a friend. No recent falls. Not currently followed by a pain management physician or taking any scheduled medications. She has been out of pain medications for about 1 year.    Triage note reports the patient was seen drinking large amounts of mouthwash at [facility] prior to arrival.  The patient denies this.  She states her mouthwash is alcohol free and she does not have a cup to spit and so she did drink some of it after swishing and gargling.  She denies any excessive use.  She states they did not know what they are talking about.      MEDICAL RECORD REVIEW:seen in this ED 4/27 per Dr Denise and myself. She walked all night and reported falling multiple times. Noted it to be cold outside. Ultimately she was d/c from the ED.       PAST MEDICAL HISTORY    Active Ambulatory Problems     Diagnosis Date Noted   • Knee pain, bilateral 08/10/2016   • Status post total knee replacement, left 02/05/2017   • Tear of deltoid ligament of ankle 02/05/2017   • Dislocation of right knee 11/02/2017   • Acute pain of right knee 11/02/2017   • Seizure-like activity (HCC) 11/18/2017   • History of total knee arthroplasty, left 02/14/2018   • Acute medial meniscus tear of right knee 02/14/2018   • Chronic pain of left knee 08/07/2019   • Anxiety 10/23/2019   • Benign essential HTN 03/27/2013   • Chronic airway obstruction, not elsewhere classified 10/04/2013   •  COPD exacerbation ([facility]) 09/17/2013   • DDD (degenerative disc disease), lumbar 11/25/2013   • Dental abscess 04/01/2013   • Depressive disorder, not elsewhere classified 03/27/2013   • 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 05/26/2014   • Fracture, femoral ([facility]) 05/24/2014   • Heart attack ([facility]) 10/23/2019   • Hyperlipemia 03/27/2013   • Hypokalemia 05/26/2014   • Hyponatremia 10/04/2013   • Intractable pain 09/17/2013   • Leukocytosis (leucocytosis) 05/28/2014   • Low back pain 06/24/2013   • Lumbar spondylosis 03/01/2013   • Lumbar stenosis 11/25/2013   • Morbid obesity ([facility]) 09/17/2013   • Narcotic dependency, continuous ([facility]) 09/17/2013   • Obesity (BMI 35.0-39.9 without comorbidity) 05/26/2014   • Normocytic anemia 09/18/2013   • Osteoarthrosis involving lower leg 09/16/2013   • Pain of left heel 10/17/2012   • Periprosthetic fracture around internal prosthetic left knee joint 05/24/2014   • Tobacco abuse 09/17/2013   • Arthralgia of knee, left 10/23/2012   • Infection of total knee replacement ([facility]) 10/03/2013   • Knee pain, right 10/23/2012   • Pain due to total left knee replacement ([facility]) 01/06/2014   • Hx of total knee replacement, right 11/25/2013   • Overweight (BMI 25.0-29.9) 10/25/2019   • Postoperative wound cellulitis 10/25/2019   • Chronic pain of right ankle 02/20/2020     Resolved Ambulatory Problems     Diagnosis Date Noted   • Primary osteoarthritis of right knee 08/10/2016     Past Medical History:   Diagnosis Date   • Depression    • Extremity pain    • GERD (gastroesophageal reflux disease)    • Hypertension    • Insomnia    • Knee swelling    • Muscle spasms of both lower extremities    • Panic attacks    • Urinary incontinence          PAST SURGICAL HISTORY  Past Surgical History:   Procedure Laterality Date   • ANKLE SURGERY Left 2003   • HAND SURGERY Right    • JOINT REPLACEMENT      7 knee revision surgery left knee    • KNEE SURGERY Left 2005   • TOTAL KNEE ARTHROPLASTY  Right 10/16/2019    Procedure: TOTAL KNEE ARTHROPLASTY;  Surgeon: Meño Almaraz MD;  Location: [facility] MAIN OR;  Service: Orthopedics         FAMILY HISTORY  Family History   Problem Relation Age of Onset   • Deep vein thrombosis Other    • Hypertension Other    • No Known Problems Mother    • No Known Problems Father    • No Known Problems Sister    • No Known Problems Brother          SOCIAL HISTORY  Social History     Socioeconomic History   • Marital status: Single   Tobacco Use   • Smoking status: Current Every Day Smoker     Packs/day: 1.00     Types: Cigarettes   • Smokeless tobacco: Never Used   • Tobacco comment: Pt has nicotine gum & lozenges   Vaping Use   • Vaping Use: Never used   Substance and Sexual Activity   • Alcohol use: Yes     Comment: vodka once  aweek   • Drug use: No   • Sexual activity: Defer         ALLERGIES  Keflex [cephalexin]        REVIEW OF SYSTEMS  Review of Systems   All systems reviewed and marked as negative except as listed in HPI       PHYSICAL EXAM    I have reviewed the triage vital signs and nursing notes.    ED Triage Vitals   Temp Heart Rate Resp BP SpO2   05/19/22 1530 05/19/22 1528 05/19/22 1528 05/19/22 1528 05/19/22 1528   98.4 °F (36.9 °C) 78 18 155/92 96 %      Temp src Heart Rate Source Patient Position BP Location FiO2 (%)   05/19/22 1530 -- -- -- --   Tympanic           GENERAL: alert well developed, well nourished in no distress  HENT: NCAT, neck supple, trachea midline  EYES: no scleral icterus, PERRL, normal conjunctivae  CV: regular rhythm, regular rate, no murmur  RESPIRATORY: unlabored effort, CTAB  ABDOMEN: soft, nontender, nondistended, bowel sounds present  MUSCULOSKELETAL: no gross deformity. No focal tenderness of lumbar spine. No step off. No foot drop. LE strength is intact bilaterally.   NEURO: alert,  sensory and motor function of extremities grossly intact, speech clear, mental status normal/baseline  SKIN: warm, dry, no rash  PSYCH:  Appropriate  mood and affect    Vital signs and nursing notes reviewed.        PROCEDURES    Procedures        MEDICATIONS GIVEN IN ER    Medications   ketorolac (TORADOL) injection 30 mg (30 mg Intramuscular Given 5/19/22 1810)         PROGRESS, DATA ANALYSIS, CONSULTS, AND MEDICAL DECISION MAKING    All labs have been independently reviewed by me.  All radiology studies have been reviewed by me.   EKG's independently reviewed by me.  Discussion below represents my analysis of pertinent findings related to patient's condition, differential diagnosis, treatment plan and final disposition.    DIFFERENTIAL DIAGNOSIS INCLUDE BUT NOT LIMITED TO: Lumbago, muscle spasm, vertebral fracture, spinal stenosis, lumbar radiculopathy, cauda equina syndrome, DDD, AAA, retroperitoneal hematoma, SBO, diverticulitis, UTI      ED Course as of 05/19/22 2134   Thu May 19, 2022   1741 Patient presents with chronic back pain and bilateral leg pain.  Patient denies any worsening pain, denies any concern for new injury.  Patient has a reassuring neuro exam.  Patient states that she is here for Toradol shot to decrease inflammation.  I discussed with patient that I am happy to give her Toradol shot and refer her to pain management.  Patient is agreeable to this.  Patient has no other concerns.  Given extensive discussion return precautions, discharging with primary care and pain management follow-up. [JG]      ED Course User Index  [JG] Duran Newton MD       AS OF 21:34 EDT VITALS:        BP - 155/92  HR - 78  TEMP - 98.4 °F (36.9 °C) (Tympanic)  O2 SATS - 96%      DIAGNOSIS  Final diagnoses:   Chronic bilateral low back pain without sciatica   Bilateral leg pain         DISPOSITION  Discharge     Pt masked in first look. I wore appropriate PPE throughout my encounters with the pt. I performed hand hygiene on entry into the pt room and upon exit.     Dictated utilizing Dragon dictation:  Much of this encounter note is an electronic  transcription/translation of spoken language to printed text.      Mahsa Barroso, APRN  05/19/22 8324

## 2022-05-19 NOTE — ED PROVIDER NOTES
MD ATTESTATION NOTE  I wore full protective equipment throughout this patient encounter including a N95 face mask, googles, gown and gloves. Hand hygiene was performed before donning protective equipment and after removal when leaving the room.    The SPIKE and I have discussed this patient's history, physical exam, and treatment plan. I have reviewed the documentation and personally had a face to face interaction with the patient. I affirm the SPIKE documentation and agree with their diagnostics, findings, treatment, plan, and disposition.    I provided a substantive portion of the care of this patient.  I personally performed the physical exam, in its entirety.  The attached note describes my personal findings.    Seda Urbina is a 62 y.o. female who presents to the ED c/o back pain as well as bilateral leg pain.  Patient reports that she has a history of prior back injury as well as had reconstructive surgery on bilateral lower extremities.  Patient reports that she has chronic pain from these injuries.  Patient reports that she was previously followed by pain management but has not been seen by pain management for the last year.  Per my nurse practitioner, patient was having radicular pain but when I discussed with patient, pain does not do not seem to be shooting through her lower extremities, patient seems to have chronic pain throughout her lower extremities from her prior injuries.  Patient denies any weakness or numbness, no incontinence of bowel or bladder.  Patient states that she would like something to help with the pain.    On exam:  General: NAD.  Head: NCAT.  ENT: nares patent, no scleral icterus  Neck: Supple, trachea midline.  Cardiac: regular rate and rhythm.  Lungs: normal effort.  Abdomen: Soft, NTTP.   Lumbar spine: No step-offs or deformities, no midline tenderness, mild bilateral paraspinal tenderness.  Lumbar spine: No step-offs or deformities, no midline tenderness to palpation.  Bilateral  lower extremities: Negative straight leg raise.  5 out of 5 strength.  Sensation intact light touch.  2+ reflexes. No clonus.  Negative Babinski sign.  extremities: Moves all extremities well, no peripheral edema  Neuro: alert, MAEW, follows commands  Psych: calm, cooperative  Skin: Warm, dry.    Medical Decision Making:  After the initial H&P, I discussed pertinent information from history and physical exam with patient/family.  Discussed differential diagnosis.  Discussed plan for ED evaluation/work-up/treatment.  All questions answered.  Patient/family is agreeable with plan.    ED Course as of 05/19/22 1918   Thu May 19, 2022   1741 Patient presents with chronic back pain and bilateral leg pain.  Patient denies any worsening pain, denies any concern for new injury.  Patient has a reassuring neuro exam.  Patient states that she is here for Toradol shot to decrease inflammation.  I discussed with patient that I am happy to give her Toradol shot and refer her to pain management.  Patient is agreeable to this.  Patient has no other concerns.  Given extensive discussion return precautions, discharging with primary care and pain management follow-up. [JG]      ED Course User Index  [JG] Duran Newton MD       Diagnosis  Final diagnoses:   Chronic bilateral low back pain without sciatica   Bilateral leg pain        Duran Newton MD  05/19/22 1918

## 2022-05-19 NOTE — ED NOTES
Pt arrives via EMS from [place] with c/o chronic back pain. Was kicked out of [place] prior to EMS arrival. EMS reports she was taking large drinks of mouthwash. Pt a&ox4, abc's intact, NAD noted, ambulatory with EMS.    Patient wearing mask during triage. RN wearing appropriate PPE during triage. Hand hygiene performed.

## 2022-05-20 ENCOUNTER — HOSPITAL ENCOUNTER (EMERGENCY)
Facility: HOSPITAL | Age: 62
Discharge: HOME | End: 2022-05-20
Attending: EMERGENCY MEDICINE | Admitting: EMERGENCY MEDICINE

## 2022-05-20 VITALS
SYSTOLIC BLOOD PRESSURE: 135 MMHG | HEART RATE: 114 BPM | OXYGEN SATURATION: 96 % | DIASTOLIC BLOOD PRESSURE: 89 MMHG | TEMPERATURE: 98.4 F | RESPIRATION RATE: 16 BRPM

## 2022-05-20 DIAGNOSIS — Z59.00 HOMELESS: ICD-10-CM

## 2022-05-20 DIAGNOSIS — Z76.5 MALINGERING: Primary | ICD-10-CM

## 2022-05-20 PROCEDURE — 99282 EMERGENCY DEPT VISIT SF MDM: CPT

## 2022-05-20 SDOH — ECONOMIC STABILITY - HOUSING INSECURITY: HOMELESSNESS UNSPECIFIED: Z59.00

## 2022-05-20 NOTE — ED NOTES
"Pt seen last night for pain. Pt states she needs to be assessed and wants \"to give my life to [facility].\" Pt was here last night and states she was here for the wrong reasons.   "

## 2022-05-20 NOTE — ED PROVIDER NOTES
" EMERGENCY DEPARTMENT ENCOUNTER  I wore full protective equipment throughout this patient encounter including a N95 mask, eye shield, gown and gloves. Hand hygiene was performed before donning protective equipment and after removal when leaving the room.    Room Number:  10/10  Date of encounter:  5/20/2022  PCP: Elkin Paris MD    HPI:  Context: Seda Urbina is a 62 y.o. female who presents to the ED c/o chief complaint of \"I want to change my life.\"  Patient states that she is unhappy with her life and would like to make changes.  Patient reports that she would like to give herself over to [facility].  I asked the patient if she had a medical problem that she wished to be evaluated for and she states that she is currently homeless.  I discussed with her that this is not a medical problem and again asked her if she had a medical problem that needed to be evaluated.  Patient then asked for pain medication.  I discussed with patient that she was previously evaluated earlier in the evening for pain and that we would not be providing her with any pain medication at this time.  Patient then requested something to eat and drink.  At this point I informed the patient that I believe that she is malingering and that I would be discharging her from the emergency department.  Patient again stated that she is homeless and where she is to go.  I discussed with her that she was found placement at [facility] last night and that we would again give her a list of local shelters.    MEDICAL HISTORY REVIEW  Reviewed in EPIC    PAST MEDICAL HISTORY  Active Ambulatory Problems     Diagnosis Date Noted   • Knee pain, bilateral 08/10/2016   • Status post total knee replacement, left 02/05/2017   • Tear of deltoid ligament of ankle 02/05/2017   • Dislocation of right knee 11/02/2017   • Acute pain of right knee 11/02/2017   • Seizure-like activity (HCC) 11/18/2017   • History of total knee arthroplasty, left " 02/14/2018   • Acute medial meniscus tear of right knee 02/14/2018   • Chronic pain of left knee 08/07/2019   • Anxiety 10/23/2019   • Benign essential HTN 03/27/2013   • Chronic airway obstruction, not elsewhere classified 10/04/2013   • COPD exacerbation ([facility]) 09/17/2013   • DDD (degenerative disc disease), lumbar 11/25/2013   • Dental abscess 04/01/2013   • Depressive disorder, not elsewhere classified 03/27/2013   • 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 05/26/2014   • Fracture, femoral ([facility]) 05/24/2014   • Heart attack ([facility]) 10/23/2019   • Hyperlipemia 03/27/2013   • Hypokalemia 05/26/2014   • Hyponatremia 10/04/2013   • Intractable pain 09/17/2013   • Leukocytosis (leucocytosis) 05/28/2014   • Low back pain 06/24/2013   • Lumbar spondylosis 03/01/2013   • Lumbar stenosis 11/25/2013   • Morbid obesity ([facility]) 09/17/2013   • Narcotic dependency, continuous ([facility]) 09/17/2013   • Obesity (BMI 35.0-39.9 without comorbidity) 05/26/2014   • Normocytic anemia 09/18/2013   • Osteoarthrosis involving lower leg 09/16/2013   • Pain of left heel 10/17/2012   • Periprosthetic fracture around internal prosthetic left knee joint 05/24/2014   • Tobacco abuse 09/17/2013   • Arthralgia of knee, left 10/23/2012   • Infection of total knee replacement ([facility]) 10/03/2013   • Knee pain, right 10/23/2012   • Pain due to total left knee replacement ([facility]) 01/06/2014   • Hx of total knee replacement, right 11/25/2013   • Overweight (BMI 25.0-29.9) 10/25/2019   • Postoperative wound cellulitis 10/25/2019   • Chronic pain of right ankle 02/20/2020     Resolved Ambulatory Problems     Diagnosis Date Noted   • Primary osteoarthritis of right knee 08/10/2016     Past Medical History:   Diagnosis Date   • Depression    • Extremity pain    • GERD (gastroesophageal reflux disease)    • Hypertension    • Insomnia    • Knee swelling    • Muscle spasms of both lower extremities    • Panic attacks    • Urinary incontinence        PAST  SURGICAL HISTORY  Past Surgical History:   Procedure Laterality Date   • ANKLE SURGERY Left 2003   • HAND SURGERY Right    • JOINT REPLACEMENT      7 knee revision surgery left knee    • KNEE SURGERY Left 2005   • TOTAL KNEE ARTHROPLASTY Right 10/16/2019    Procedure: TOTAL KNEE ARTHROPLASTY;  Surgeon: Meño Almaraz MD;  Location: [facility] MAIN OR;  Service: Orthopedics       FAMILY HISTORY  Family History   Problem Relation Age of Onset   • Deep vein thrombosis Other    • Hypertension Other    • No Known Problems Mother    • No Known Problems Father    • No Known Problems Sister    • No Known Problems Brother        SOCIAL HISTORY  Social History     Socioeconomic History   • Marital status: Single   Tobacco Use   • Smoking status: Current Every Day Smoker     Packs/day: 1.00     Types: Cigarettes   • Smokeless tobacco: Never Used   • Tobacco comment: Pt has nicotine gum & lozenges   Vaping Use   • Vaping Use: Never used   Substance and Sexual Activity   • Alcohol use: Yes     Comment: vodka once  aweek   • Drug use: No   • Sexual activity: Defer       ALLERGIES  Keflex [cephalexin]    The patient's allergies have been reviewed    REVIEW OF SYSTEMS  All systems reviewed and negative except for those discussed in HPI.     PHYSICAL EXAM  I have reviewed the triage vital signs and nursing notes.  ED Triage Vitals [05/20/22 0902]   Temp Heart Rate Resp BP SpO2   98.4 °F (36.9 °C) 114 -- -- 96 %      Temp src Heart Rate Source Patient Position BP Location FiO2 (%)   -- -- -- -- --       General: No acute distress.  HENT: NCAT, PERRL, Nares patent.  Eyes: no scleral icterus.  Neck: trachea midline, no ROM limitations.  CV: regular rhythm, regular rate.  Respiratory: normal effort, CTAB.  Abdomen: soft, nondistended, NTTP, no rebound tenderness, no guarding or rigidity.  Musculoskeletal: no deformity.  Neuro: alert, moves all extremities, follows commands.  Psych: Not depressed, not agitated, denies SI/HI/AVH  Skin: warm,  dry.    LAB RESULTS  No results found for this or any previous visit (from the past 24 hour(s)).    I ordered the above labs and reviewed the results.    RADIOLOGY  No Radiology Exams Resulted Within Past 24 Hours    I ordered the above noted radiological studies. I reviewed the images and results. I agree with the radiologist interpretation.    PROCEDURES  Procedures    MEDICATIONS GIVEN IN ER  Medications - No data to display    PROGRESS, DATA ANALYSIS, CONSULTS, AND MEDICAL DECISION MAKING  A complete history and physical exam have been performed.  All available laboratory and imaging results have been reviewed by myself prior to disposition.    MDM  After the initial H&P, I discussed pertinent information from history and physical exam with patient/family.  Discussed differential diagnosis.  Discussed plan for ED evaluation/workup/treatment.  All questions answered.  Patient/family is agreeable with plan.       AS OF 09:54 EDT VITALS:    BP - 135/89  HR - 114  TEMP - 98.4 °F (36.9 °C)  O2 SATS - 96%    DIAGNOSIS  Final diagnoses:   Malingering   Homeless         DISPOSITION  DISCHARGE    Patient discharged in stable condition.    Reviewed implications of results, diagnosis, meds, responsibility to follow up, warning signs and symptoms of possible worsening, potential complications and reasons to return to ER.    Patient/Family voiced understanding of above instructions.    Discussed plan for discharge, as there is no emergent indication for admission. Patient referred to primary care provider for BP management due to today's BP. Pt/family is agreeable and understands need for follow up and repeat testing.  Pt is aware that discharge does not mean that nothing is wrong but it indicates no emergency is present that requires admission and they must continue care with follow-up as given below or physician of their choice.     FOLLOW-UP  Elkin Paris MD  [address] [place]  [address]  [phone]    Schedule an appointment as soon as possible for a visit in 2 days      [address] [address] [phone]  Schedule an appointment as soon as possible for a visit in 2 days  for further management of your anxiety         Medication List      No changes were made to your prescriptions during this visit.          Duran Newton MD  05/20/22 0954

## 2023-11-10 NOTE — TELEPHONE ENCOUNTER
Patient called requesting a refill on her pain medication. Patient said the only problem is she doesn't have a car right now and will have no way of picking it up here at the office. She was wondering if we could Escribe it to the [facility] here on [facility] for her friend to  after work tomorrow. She said she will be out by the weekend and she said this will be her last Rx from us. She also doesn't have a phone right now to call her back. She's hoping to have another phone by tomorrow.    aerosol mask

## 2024-06-13 NOTE — TELEPHONE ENCOUNTER
Benadryl every 6 hours as needed  Follow-up with primary care provider   Spoke with patient and rescheduled appt I misunderstood patient does not need a script at this time has one to fill on 3/28

## 2025-05-30 NOTE — TELEPHONE ENCOUNTER
Patient called me 4 times yesterday request for pain medication refill but also wanting an rx for a shower chair. 36.8

## (undated) DEVICE — GLV SURG TRIUMPH ORTHO W/ALOE PF LTX 8 STRL

## (undated) DEVICE — PK TOTL KN 50

## (undated) DEVICE — SOL IRRIG SOD CHL 0.9PCT 3000ML

## (undated) DEVICE — BNDG ELAS ELITE V/CLOSE 4IN 5YD LF STRL

## (undated) DEVICE — ZIPPERED TOGA, 2X LARGE: Brand: FLYTE

## (undated) DEVICE — PK PROC TURNOVER

## (undated) DEVICE — SKIN AFFIX SURG ADHESIVE 72/CS 0.55ML: Brand: MEDLINE

## (undated) DEVICE — GLV SURG TRIUMPH ORTHO W/ALOE PF LTX 8.5 STRL

## (undated) DEVICE — DRSNG SURG AQUACEL AG 9X25CM

## (undated) DEVICE — SUT VIC 2/0 CT2 27IN J269H

## (undated) DEVICE — DECANTER: Brand: UNBRANDED

## (undated) DEVICE — DRAPE SHEET ULTRAGARD: Brand: MEDLINE

## (undated) DEVICE — PIN DRL NOHEAD TROC 3.2X75MM

## (undated) DEVICE — GLV SURG TRIUMPH GREEN W/ALOE PF LTX 8 STRL

## (undated) DEVICE — IRRIGATOR BULB ASEPTO 60CC STRL

## (undated) DEVICE — GLV SURG TRIUMPH GREEN W/ALOE PF LTX 6 STRL

## (undated) DEVICE — DRAPE,U/ SHT,SPLIT,PLAS,STERIL: Brand: MEDLINE

## (undated) DEVICE — PAD COLD/THRP KN POLAR/CARE WRAP/ON XL

## (undated) DEVICE — SYS COLD/THRP POLAR/CARE/CUBE

## (undated) DEVICE — INTEGUSEAL MICROBIAL SEALANT: Brand: AVANOS

## (undated) DEVICE — GLV SURG TRIUMPH LT PF LTX 8.5 STRL

## (undated) DEVICE — PENCL HND ROCKRSWTCH HOLSTR EZ CLEAN TP CRD 10FT

## (undated) DEVICE — STERILE PATIENT PROTECTIVE PAD FOR IMP® KNEE POSITIONERS & COHESIVE WRAP (10 / CASE): Brand: DE MAYO KNEE POSITIONER®

## (undated) DEVICE — DRSNG BARR INSUL KN POLAR/CARE S/ADHS LG

## (undated) DEVICE — GLV SURG TRIUMPH LT PF LTX 6 STRL

## (undated) DEVICE — SOL IRRIG H2O 1000ML STRL

## (undated) DEVICE — 3 BONE CEMENT MIXER: Brand: MIXEVAC

## (undated) DEVICE — DUAL CUT SAGITTAL BLADE

## (undated) DEVICE — NDL SPINE 18G 31/2IN PNK

## (undated) DEVICE — BNDG ELAS ELITE V/CLOSE 6IN 5YD LF STRL

## (undated) DEVICE — CUFF TOURNI 1BLADDER 1PRT 30IN STRL

## (undated) DEVICE — PAD CAST SPECIST 6IN STRL

## (undated) DEVICE — UNDYED BRAIDED (POLYGLACTIN 910), SYNTHETIC ABSORBABLE SUTURE: Brand: COATED VICRYL